# Patient Record
Sex: MALE | Race: BLACK OR AFRICAN AMERICAN | Employment: OTHER | URBAN - METROPOLITAN AREA
[De-identification: names, ages, dates, MRNs, and addresses within clinical notes are randomized per-mention and may not be internally consistent; named-entity substitution may affect disease eponyms.]

---

## 2019-12-26 PROBLEM — N20.0 CALCULUS OF KIDNEY: Status: ACTIVE | Noted: 2019-10-23

## 2020-09-08 ENCOUNTER — ED HISTORICAL/CONVERTED ENCOUNTER (OUTPATIENT)
Dept: OTHER | Age: 60
End: 2020-09-08

## 2021-05-11 ENCOUNTER — HOSPITAL ENCOUNTER (EMERGENCY)
Age: 61
Discharge: HOME | End: 2021-05-11
Attending: EMERGENCY MEDICINE
Payer: MEDICARE

## 2021-05-11 VITALS
HEART RATE: 106 BPM | TEMPERATURE: 98.3 F | RESPIRATION RATE: 18 BRPM | HEIGHT: 69 IN | OXYGEN SATURATION: 97 % | BODY MASS INDEX: 18.51 KG/M2 | DIASTOLIC BLOOD PRESSURE: 87 MMHG | WEIGHT: 125 LBS | SYSTOLIC BLOOD PRESSURE: 124 MMHG

## 2021-05-11 DIAGNOSIS — T50.Z95A SIDE EFFECTS OF VACCINATION, INITIAL ENCOUNTER: Primary | ICD-10-CM

## 2021-05-11 LAB
ATRIAL RATE: 109 BPM
CALCULATED P AXIS, ECG09: 73 DEGREES
CALCULATED R AXIS, ECG10: 32 DEGREES
CALCULATED T AXIS, ECG11: 28 DEGREES
DIAGNOSIS, 93000: NORMAL
P-R INTERVAL, ECG05: 152 MS
Q-T INTERVAL, ECG07: 330 MS
QRS DURATION, ECG06: 84 MS
QTC CALCULATION (BEZET), ECG08: 444 MS
VENTRICULAR RATE, ECG03: 109 BPM

## 2021-05-11 PROCEDURE — 74011250637 HC RX REV CODE- 250/637: Performed by: EMERGENCY MEDICINE

## 2021-05-11 PROCEDURE — 93005 ELECTROCARDIOGRAM TRACING: CPT

## 2021-05-11 PROCEDURE — 99284 EMERGENCY DEPT VISIT MOD MDM: CPT

## 2021-05-11 RX ORDER — IBUPROFEN 600 MG/1
600 TABLET ORAL
Status: COMPLETED | OUTPATIENT
Start: 2021-05-11 | End: 2021-05-11

## 2021-05-11 RX ORDER — IBUPROFEN 400 MG/1
400 TABLET ORAL
Qty: 20 TAB | Refills: 0 | Status: SHIPPED | OUTPATIENT
Start: 2021-05-11 | End: 2021-11-01

## 2021-05-11 RX ADMIN — IBUPROFEN 600 MG: 600 TABLET, FILM COATED ORAL at 01:09

## 2021-05-11 NOTE — ED TRIAGE NOTES
Pt received first dose of COVID vaccine today. Has complained of arm pain ever since.  Relief with tylenol. + ETOH

## 2021-05-11 NOTE — ED PROVIDER NOTES
EMERGENCY DEPARTMENT HISTORY AND PHYSICAL EXAM      Date: 5/11/2021  Patient Name: Emily Ward    History of Presenting Illness     Chief Complaint   Patient presents with    Arm Pain       History Provided By: Patient    HPI: Emily Ward, 62 y.o. male with a past medical history significant hypertension and hyperlipidemia presents to the ED with cc of right arm pain after the Covid injection. Patient said that he got Covid injection right upper arm this morning. Ever since he has been having pain that is starting the site of injection she was down to his arm. Patient denies any vomiting. No fever. No headache. No neck pain. No injury. No trauma. No chest pain or shortness of breath. No other complaints at this time. There are no other complaints, changes, or physical findings at this time. PCP: Shivani Bass NP    No current facility-administered medications on file prior to encounter. Current Outpatient Medications on File Prior to Encounter   Medication Sig Dispense Refill    atorvastatin (LIPITOR) 10 mg tablet TAKE 1 TABLET BY MOUTH EVERYDAY AT BEDTIME      divalproex DR (DEPAKOTE) 250 mg tablet Take 250 mg by mouth.  gabapentin (NEURONTIN) 300 mg capsule Take 300 mg by mouth.  metoprolol tartrate (LOPRESSOR) 50 mg tablet TAKE 1 TABLET BY MOUTH TWICE A DAY  0    HYDROcodone-acetaminophen (NORCO) 5-325 mg per tablet Take 1 Tab by mouth. Past History     Past Medical History:  Past Medical History:   Diagnosis Date    Hypercholesterolemia     Hypertension        Past Surgical History:  Past Surgical History:   Procedure Laterality Date    HX NEPHROSTOMY         Family History:  History reviewed. No pertinent family history. Social History:  Social History     Tobacco Use    Smoking status: Never Smoker    Smokeless tobacco: Current User   Substance Use Topics    Alcohol use:  Yes    Drug use: Never       Allergies:  No Known Allergies      Review of Systems     Review of Systems   Constitutional: Negative. HENT: Negative. Eyes: Negative. Respiratory: Negative. Cardiovascular: Negative. Gastrointestinal: Negative. Endocrine: Negative. Genitourinary: Negative. Musculoskeletal: Negative. Skin: Negative. Allergic/Immunologic: Negative. Neurological: Negative. Hematological: Negative. Psychiatric/Behavioral: The patient is nervous/anxious. All other systems reviewed and are negative. Physical Exam     Physical Exam  Vitals signs and nursing note reviewed. Constitutional:       General: He is not in acute distress. Appearance: Normal appearance. He is normal weight. He is not ill-appearing, toxic-appearing or diaphoretic. HENT:      Head: Normocephalic and atraumatic. Nose: Nose normal. No congestion or rhinorrhea. Eyes:      General: No scleral icterus. Right eye: No discharge. Left eye: No discharge. Extraocular Movements: Extraocular movements intact. Conjunctiva/sclera: Conjunctivae normal.      Pupils: Pupils are equal, round, and reactive to light. Neck:      Musculoskeletal: Normal range of motion. No neck rigidity or muscular tenderness. Cardiovascular:      Rate and Rhythm: Normal rate and regular rhythm. Pulses: Normal pulses. Heart sounds: Normal heart sounds. Pulmonary:      Effort: Pulmonary effort is normal. No respiratory distress. Breath sounds: Normal breath sounds. No stridor. No wheezing, rhonchi or rales. Chest:      Chest wall: No tenderness. Abdominal:      General: Abdomen is flat. Bowel sounds are normal. There is no distension. Palpations: Abdomen is soft. Tenderness: There is no abdominal tenderness. There is no right CVA tenderness, left CVA tenderness, guarding or rebound. Musculoskeletal: Normal range of motion. General: Tenderness present. No swelling, deformity or signs of injury.       Right lower leg: No edema. Left lower leg: No edema. Comments: Positive mild to moderate tenderness upon palpation over deltoid muscle laterally due to the pain from the injection. This is over injection site. Patient has full range of motion of right shoulder right elbow and right wrist.   Skin:     General: Skin is warm. Coloration: Skin is not jaundiced or pale. Findings: No bruising, erythema, lesion or rash. Neurological:      General: No focal deficit present. Mental Status: He is alert and oriented to person, place, and time. Mental status is at baseline. Cranial Nerves: No cranial nerve deficit. Sensory: No sensory deficit. Motor: No weakness. Coordination: Coordination normal.   Psychiatric:         Behavior: Behavior normal.         Thought Content: Thought content normal.         Judgment: Judgment normal.      Comments: Patient is anxious. Lab and Diagnostic Study Results     Labs -   No results found for this or any previous visit (from the past 12 hour(s)). Radiologic Studies -   @lastxrresult@  CT Results  (Last 48 hours)    None        CXR Results  (Last 48 hours)    None            Medical Decision Making   - I am the first provider for this patient. - I reviewed the vital signs, available nursing notes, past medical history, past surgical history, family history and social history. - Initial assessment performed. The patients presenting problems have been discussed, and they are in agreement with the care plan formulated and outlined with them. I have encouraged them to ask questions as they arise throughout their visit. Vital Signs-Reviewed the patient's vital signs.   Patient Vitals for the past 12 hrs:   Temp Pulse Resp BP SpO2   05/11/21 0055 98.3 °F (36.8 °C) (!) 106 18 124/87 97 %       Records Reviewed: Nursing Notes          ED Course:          Provider Notes (Medical Decision Making):     MDM  Number of Diagnoses or Management Options  Side effects of vaccination, initial encounter: new, no workup  Diagnosis management comments: Differential diagnosis include vaccination side effect, cellulitis. Amount and/or Complexity of Data Reviewed  Independent visualization of images, tracings, or specimens: yes (EKG was done at 1:07 AM.  Sinus tachycardia. Rate of 109. Normal axis. No acute ST-T elevation. No STEMI. No old EKG available for comparison.)    Risk of Complications, Morbidity, and/or Mortality  Presenting problems: moderate  Diagnostic procedures: moderate  Management options: moderate  General comments: Patient remained stable throughout the course of treatment. Due to the patient age patient was made to order EKG. EKG did not show any acute abnormality except for mild tachycardia which I believe this is due to the patient being anxious at this time. .  Patient was given pain medicine with good results. Will discharge patient on pain medicine to follow-up with his primary care doctor. Patient understood and agree with her management. EKG was done at 1:07 AM.  Sinus tachycardia. Rate of 109. Normal axis. No acute ST-T elevation. No STEMI. No old EKG available for comparison. Procedures   Medical Decision Makingedical Decision Making  Performed by: Michael Rouse DO  PROCEDURES:  Procedures           Disposition   Disposition: Condition stable and improved    Discharged    DISCHARGE PLAN:  1. Current Discharge Medication List      CONTINUE these medications which have NOT CHANGED    Details   atorvastatin (LIPITOR) 10 mg tablet TAKE 1 TABLET BY MOUTH EVERYDAY AT BEDTIME      divalproex DR (DEPAKOTE) 250 mg tablet Take 250 mg by mouth.      gabapentin (NEURONTIN) 300 mg capsule Take 300 mg by mouth.      metoprolol tartrate (LOPRESSOR) 50 mg tablet TAKE 1 TABLET BY MOUTH TWICE A DAY  Refills: 0      HYDROcodone-acetaminophen (NORCO) 5-325 mg per tablet Take 1 Tab by mouth.            2.   Follow-up Information Follow up With Specialties Details Why Contact Conrado Hendrix NP Nurse Practitioner Schedule an appointment as soon as possible for a visit in 1 day  [address] [place] [phone]          3. Return to ED if worse   4. Current Discharge Medication List      START taking these medications    Details   ibuprofen (MOTRIN) 400 mg tablet Take 1 Tab by mouth every six (6) hours as needed for Pain. Qty: 20 Tab, Refills: 0  Start date: 5/11/2021               Diagnosis     Clinical Impression:   1. Side effects of vaccination, initial encounter        Attestations:    Denzel Gonzalez, DO    Please note that this dictation was completed with [employer], the computer voice recognition software. Quite often unanticipated grammatical, syntax, homophones, and other interpretive errors are inadvertently transcribed by the computer software. Please disregard these errors. Please excuse any errors that have escaped final proofreading. Thank you.

## 2021-05-27 ENCOUNTER — HOSPITAL ENCOUNTER (EMERGENCY)
Age: 61
Discharge: HOME | End: 2021-05-28
Payer: MEDICARE

## 2021-05-27 VITALS
HEIGHT: 69 IN | WEIGHT: 130 LBS | DIASTOLIC BLOOD PRESSURE: 72 MMHG | OXYGEN SATURATION: 97 % | HEART RATE: 85 BPM | BODY MASS INDEX: 19.26 KG/M2 | SYSTOLIC BLOOD PRESSURE: 104 MMHG | RESPIRATION RATE: 20 BRPM | TEMPERATURE: 97.8 F

## 2021-05-27 DIAGNOSIS — M62.838 TRAPEZIUS MUSCLE SPASM: Primary | ICD-10-CM

## 2021-05-27 PROCEDURE — 99283 EMERGENCY DEPT VISIT LOW MDM: CPT

## 2021-05-28 NOTE — ED PROVIDER NOTES
EMERGENCY DEPARTMENT HISTORY AND PHYSICAL EXAM      Date: 5/27/2021  Patient Name: Irene Chacon    History of Presenting Illness     Chief Complaint   Patient presents with    Arm Pain       History Provided By: Patient    HPI: Irene Chacon, 62 y.o. male with a past medical history significant hypertension and hyperlipidemia presents to the ED with cc of arm pain. Patient claims of the arm pain at her Covid vaccine. Patient admits to drinking. Patient has no other complaints at this time. Moderate severity, no known exacerbating or relieving factors, no other associated signs and symptoms. Patient specifically denies fever, chills, chest pain, shortness of breath, abdominal pain, nausea, vomiting, diarrhea. There are no other complaints, changes, or physical findings at this time. PCP: Maribell Burciaga NP    No current facility-administered medications on file prior to encounter. Current Outpatient Medications on File Prior to Encounter   Medication Sig Dispense Refill    ibuprofen (MOTRIN) 400 mg tablet Take 1 Tab by mouth every six (6) hours as needed for Pain. 20 Tab 0    atorvastatin (LIPITOR) 10 mg tablet TAKE 1 TABLET BY MOUTH EVERYDAY AT BEDTIME      divalproex DR (DEPAKOTE) 250 mg tablet Take 250 mg by mouth.  gabapentin (NEURONTIN) 300 mg capsule Take 300 mg by mouth.  metoprolol tartrate (LOPRESSOR) 50 mg tablet TAKE 1 TABLET BY MOUTH TWICE A DAY  0    HYDROcodone-acetaminophen (NORCO) 5-325 mg per tablet Take 1 Tab by mouth. Past History     Past Medical History:  Past Medical History:   Diagnosis Date    Hypercholesterolemia     Hypertension        Past Surgical History:  Past Surgical History:   Procedure Laterality Date    HX NEPHROSTOMY         Family History:  History reviewed. No pertinent family history.     Social History:  Social History     Tobacco Use    Smoking status: Never Smoker    Smokeless tobacco: Current User   Vaping Use    Vaping Use: Never used   Substance Use Topics    Alcohol use: Yes    Drug use: Never       Allergies:  No Known Allergies      Review of Systems     Review of Systems   Constitutional: Negative for chills and fever. HENT: Negative for dental problem and sore throat. Eyes: Negative for pain and visual disturbance. Respiratory: Negative for cough and chest tightness. Cardiovascular: Negative for chest pain. Gastrointestinal: Negative for diarrhea and nausea. Genitourinary: Negative for difficulty urinating and frequency. Musculoskeletal: Positive for arthralgias. Negative for gait problem and joint swelling. Neurological: Positive for headaches. Negative for numbness. Hematological: Negative for adenopathy. Does not bruise/bleed easily. Psychiatric/Behavioral: Negative for behavioral problems and suicidal ideas. Physical Exam     Physical Exam  Constitutional:       General: He is not in acute distress. Appearance: Normal appearance. He is not ill-appearing or toxic-appearing. HENT:      Head: Normocephalic and atraumatic. Nose: Nose normal.      Mouth/Throat:      Mouth: Mucous membranes are moist.   Eyes:      Extraocular Movements: Extraocular movements intact. Pupils: Pupils are equal, round, and reactive to light. Cardiovascular:      Rate and Rhythm: Normal rate and regular rhythm. Pulmonary:      Effort: Pulmonary effort is normal.      Breath sounds: Normal breath sounds. Abdominal:      General: Bowel sounds are normal.   Musculoskeletal:         General: Normal range of motion. Right upper arm: Tenderness present. Arms:       Cervical back: Normal range of motion and neck supple. Skin:     General: Skin is warm and dry. Capillary Refill: Capillary refill takes less than 2 seconds. Neurological:      General: No focal deficit present. Mental Status: He is alert and oriented to person, place, and time.    Psychiatric:         Mood and Affect: Mood normal.         Behavior: Behavior normal.         Lab and Diagnostic Study Results     Labs -   No results found for this or any previous visit (from the past 12 hour(s)). Radiologic Studies -   @lastxrresult@  CT Results  (Last 48 hours)    None        CXR Results  (Last 48 hours)    None            Medical Decision Making   - I am the first provider for this patient. - I reviewed the vital signs, available nursing notes, past medical history, past surgical history, family history and social history. - Initial assessment performed. The patients presenting problems have been discussed, and they are in agreement with the care plan formulated and outlined with them. I have encouraged them to ask questions as they arise throughout their visit. Vital Signs-Reviewed the patient's vital signs. Patient Vitals for the past 12 hrs:   Temp Pulse Resp BP SpO2   05/27/21 2353 97.8 °F (36.6 °C) 85 20 104/72 97 %       Records Reviewed: Nursing Notes and Old Medical Records          ED Course:          Provider Notes (Medical Decision Making):   Patient presents with right arm pain. Differential diagnosis include strain, sprain, phlebitis, Covid arm. Patient intoxicated with EtOH. Patient able ambulate without difficulty. Pain complaints of pain where he had his Covid vaccine. MDM       Procedures   Medical Decision Makingedical Decision Making  Performed by: Odilia Morrison NP  PROCEDURES:  Procedures       Disposition   Disposition: DC- Adult Discharges: All of the diagnostic tests were reviewed and questions answered. Diagnosis, care plan and treatment options were discussed. The patient understands the instructions and will follow up as directed. The patients results have been reviewed with them. They have been counseled regarding their diagnosis.   The patient verbally convey understanding and agreement of the signs, symptoms, diagnosis, treatment and prognosis and additionally agrees to follow up as recommended with their PCP in 24 - 48 hours. They also agree with the care-plan and convey that all of their questions have been answered. I have also put together some discharge instructions for them that include: 1) educational information regarding their diagnosis, 2) how to care for their diagnosis at home, as well a 3) list of reasons why they would want to return to the ED prior to their follow-up appointment, should their condition change. DISCHARGE PLAN:  1. Current Discharge Medication List      CONTINUE these medications which have NOT CHANGED    Details   ibuprofen (MOTRIN) 400 mg tablet Take 1 Tab by mouth every six (6) hours as needed for Pain. Qty: 20 Tab, Refills: 0      atorvastatin (LIPITOR) 10 mg tablet TAKE 1 TABLET BY MOUTH EVERYDAY AT BEDTIME      divalproex DR (DEPAKOTE) 250 mg tablet Take 250 mg by mouth.      gabapentin (NEURONTIN) 300 mg capsule Take 300 mg by mouth.      metoprolol tartrate (LOPRESSOR) 50 mg tablet TAKE 1 TABLET BY MOUTH TWICE A DAY  Refills: 0      HYDROcodone-acetaminophen (NORCO) 5-325 mg per tablet Take 1 Tab by mouth. 2.   Follow-up Information    None       3. Return to ED if worse   4. Current Discharge Medication List            Diagnosis     Clinical Impression: No diagnosis found. Attestations:    Zaida Thompson NP    Please note that this dictation was completed with [employer], the computer voice recognition software. Quite often unanticipated grammatical, syntax, homophones, and other interpretive errors are inadvertently transcribed by the computer software. Please disregard these errors. Please excuse any errors that have escaped final proofreading. Thank you.

## 2021-07-12 ENCOUNTER — HOSPITAL ENCOUNTER (EMERGENCY)
Age: 61
Discharge: HOME | End: 2021-07-12
Payer: MEDICARE

## 2021-07-12 ENCOUNTER — APPOINTMENT (OUTPATIENT)
Dept: GENERAL RADIOLOGY | Age: 61
End: 2021-07-12
Attending: NURSE PRACTITIONER
Payer: MEDICARE

## 2021-07-12 VITALS
TEMPERATURE: 97.5 F | RESPIRATION RATE: 16 BRPM | HEART RATE: 90 BPM | DIASTOLIC BLOOD PRESSURE: 58 MMHG | OXYGEN SATURATION: 100 % | SYSTOLIC BLOOD PRESSURE: 108 MMHG

## 2021-07-12 DIAGNOSIS — Z76.0 MEDICATION REFILL: Primary | ICD-10-CM

## 2021-07-12 DIAGNOSIS — M79.605 CHRONIC PAIN OF LEFT LOWER EXTREMITY: ICD-10-CM

## 2021-07-12 DIAGNOSIS — G89.29 CHRONIC PAIN OF LEFT LOWER EXTREMITY: ICD-10-CM

## 2021-07-12 PROCEDURE — 99284 EMERGENCY DEPT VISIT MOD MDM: CPT

## 2021-07-12 PROCEDURE — 73590 X-RAY EXAM OF LOWER LEG: CPT

## 2021-07-12 PROCEDURE — 74011250637 HC RX REV CODE- 250/637: Performed by: NURSE PRACTITIONER

## 2021-07-12 PROCEDURE — 73610 X-RAY EXAM OF ANKLE: CPT

## 2021-07-12 RX ORDER — DIVALPROEX SODIUM 250 MG/1
250 TABLET, DELAYED RELEASE ORAL
Status: COMPLETED | OUTPATIENT
Start: 2021-07-12 | End: 2021-07-12

## 2021-07-12 RX ORDER — NAPROXEN 500 MG/1
500 TABLET ORAL
Status: COMPLETED | OUTPATIENT
Start: 2021-07-12 | End: 2021-07-12

## 2021-07-12 RX ORDER — NAPROXEN 500 MG/1
500 TABLET ORAL 2 TIMES DAILY WITH MEALS
Qty: 30 TABLET | Refills: 0 | Status: ON HOLD | OUTPATIENT
Start: 2021-07-12 | End: 2021-11-01 | Stop reason: SDUPTHER

## 2021-07-12 RX ORDER — DIVALPROEX SODIUM 250 MG/1
250 TABLET, DELAYED RELEASE ORAL 2 TIMES DAILY
Qty: 60 TABLET | Refills: 0 | Status: SHIPPED | OUTPATIENT
Start: 2021-07-12 | End: 2021-08-11

## 2021-07-12 RX ADMIN — NAPROXEN 500 MG: 500 TABLET ORAL at 03:05

## 2021-07-12 RX ADMIN — DIVALPROEX SODIUM 250 MG: 250 TABLET, DELAYED RELEASE ORAL at 03:00

## 2021-07-12 NOTE — DISCHARGE INSTRUCTIONS
Thank you! Thank you for allowing me to care for you in the emergency department. I sincerely hope that you are satisfied with your visit today. It is my goal to provide you with excellent care. Below you will find a list of your labs and imaging from your visit today. Should you have any questions regarding these results please do not hesitate to call the emergency department. Labs -   No results found for this or any previous visit (from the past 12 hour(s)). Radiologic Studies -   XR TIB/FIB LT   Final Result   Findings/impression:      3 views of the left ankle, 4 views of the left tibia/fibula. Status post ORIF of distal tibial diaphyseal fracture. Fracture is healed. There   is chronic mild apex medial angulation of the distal tibia and fibula. No   evidence of acute fracture. Articular alignment is anatomic. Ankle mortise is intact without evidence of widening. XR ANKLE LT MIN 3 V   Final Result   Findings/impression:      3 views of the left ankle, 4 views of the left tibia/fibula. Status post ORIF of distal tibial diaphyseal fracture. Fracture is healed. There   is chronic mild apex medial angulation of the distal tibia and fibula. No   evidence of acute fracture. Articular alignment is anatomic. Ankle mortise is intact without evidence of widening. CT Results  (Last 48 hours)      None          CXR Results  (Last 48 hours)      None               If you feel that you have not received excellent quality care or timely care, please ask to speak to the nurse manager. Please choose us in the future for your continued health care needs. ------------------------------------------------------------------------------------------------------------  The exam and treatment you received in the Emergency Department were for an urgent problem and are not intended as complete care.  It is important that you follow-up with a doctor, nurse practitioner, or physician assistant to:  (1) confirm your diagnosis,  (2) re-evaluation of changes in your illness and treatment, and  (3) for ongoing care. If your symptoms become worse or you do not improve as expected and you are unable to reach your usual health care provider, you should return to the Emergency Department. We are available 24 hours a day. Please take your discharge instructions with you when you go to your follow-up appointment. If you have any problem arranging a follow-up appointment, contact the Emergency Department immediately. If a prescription has been provided, please have it filled as soon as possible to prevent a delay in treatment. Read the entire medication instruction sheet provided to you by the pharmacy. If you have any questions or reservations about taking the medication due to side effects or interactions with other medications, please call your primary care physician or contact the ER to speak with the charge nurse. Make an appointment with your family doctor or the physician you were referred to for follow-up of this visit as instructed on your discharge paperwork, as this is a mandatory follow-up. Return to the ER if you are unable to be seen or if you are unable to be seen in a timely manner. If you have any problem arranging the follow-up visit, contact the Emergency Department immediately.

## 2021-07-12 NOTE — ED PROVIDER NOTES
EMERGENCY DEPARTMENT HISTORY AND PHYSICAL EXAM      Date: 7/12/2021  Patient Name: Pedro Messina    History of Presenting Illness     Chief Complaint   Patient presents with    Alcohol Problem     ETOH \"I only drank 1oz\" , c/o leg pain, cough, headache    Leg Pain    Cough    Headache       History Provided By: Patient    HPI: Pedro Messina, 62 y.o. male with a past medical history significant for HTN, seizures and hyperlipidemia presents to the ED with cc of \"I need a colonoscopy and I ran out of my seizure medication today and my leg with the pins and screws and it has been hurting since Zeinab\". He denies any recent injury or trauma to his leg. He states he needs percocet and a new orthopedic doctor follow-up with since his surgeon was in [place]. Patient was advised that we do not do routine colonoscopies from the emergency room. He denies any GI concerns and states he was advised by his PCP to get one due to his age. There are no other complaints, changes, or physical findings at this time. PCP: iGa Murcia NP    No current facility-administered medications on file prior to encounter. Current Outpatient Medications on File Prior to Encounter   Medication Sig Dispense Refill    ibuprofen (MOTRIN) 400 mg tablet Take 1 Tab by mouth every six (6) hours as needed for Pain. 20 Tab 0    atorvastatin (LIPITOR) 10 mg tablet TAKE 1 TABLET BY MOUTH EVERYDAY AT BEDTIME      gabapentin (NEURONTIN) 300 mg capsule Take 300 mg by mouth.  metoprolol tartrate (LOPRESSOR) 50 mg tablet TAKE 1 TABLET BY MOUTH TWICE A DAY  0    HYDROcodone-acetaminophen (NORCO) 5-325 mg per tablet Take 1 Tab by mouth.  [DISCONTINUED] divalproex DR (DEPAKOTE) 250 mg tablet Take 250 mg by mouth.          Past History     Past Medical History:  Past Medical History:   Diagnosis Date    Hypercholesterolemia     Hypertension        Past Surgical History:  Past Surgical History:   Procedure Laterality Date    HX NEPHROSTOMY         Family History:  No family history on file. Social History:  Social History     Tobacco Use    Smoking status: Never Smoker    Smokeless tobacco: Current User   Vaping Use    Vaping Use: Never used   Substance Use Topics    Alcohol use: Yes    Drug use: Never       Allergies:  No Known Allergies      Review of Systems     Review of Systems   Gastrointestinal: Negative. Musculoskeletal: Positive for arthralgias. All other systems reviewed and are negative. Physical Exam     Physical Exam  Vitals and nursing note reviewed. Constitutional:       General: He is not in acute distress. Comments: Unkempt appearing   Cardiovascular:      Rate and Rhythm: Normal rate and regular rhythm. Heart sounds: Normal heart sounds. Pulmonary:      Effort: Pulmonary effort is normal.      Breath sounds: Decreased breath sounds present. No wheezing or rales. Musculoskeletal:         General: Normal range of motion. Left lower leg: Tenderness present. No swelling or bony tenderness. No edema. Left ankle: No swelling or deformity. Tenderness present over the medial malleolus. Normal range of motion. Normal pulse. Left Achilles Tendon: Normal.   Skin:     General: Skin is warm and dry. Neurological:      Mental Status: He is alert. Psychiatric:         Mood and Affect: Mood normal.         Speech: Speech normal.         Behavior: Behavior normal. Behavior is cooperative. Lab and Diagnostic Study Results     Labs -   No results found for this or any previous visit (from the past 12 hour(s)). Radiologic Studies -   XR Results (most recent):  Results from Hospital Encounter encounter on 07/12/21    XR TIB/FIB LT    Narrative  Study: XR ANKLE LT MIN 3 V, XR TIB/FIB LT    Clinical indication: pain, surgical hx    Comparison: None. Impression  Findings/impression:    3 views of the left ankle, 4 views of the left tibia/fibula.     Status post ORIF of distal tibial diaphyseal fracture. Fracture is healed. There  is chronic mild apex medial angulation of the distal tibia and fibula. No  evidence of acute fracture. Articular alignment is anatomic. Ankle mortise is intact without evidence of widening. Medical Decision Making   - I am the first provider for this patient. - I reviewed the vital signs, available nursing notes, past medical history, past surgical history, family history and social history. - Initial assessment performed. The patients presenting problems have been discussed, and they are in agreement with the care plan formulated and outlined with them. I have encouraged them to ask questions as they arise throughout their visit. Vital Signs-Reviewed the patient's vital signs. Patient Vitals for the past 12 hrs:   Temp Pulse Resp BP SpO2   07/12/21 0136 97.5 °F (36.4 °C) 90 16 (!) 108/58 100 %       Records Reviewed: Nursing Notes    ED Course:   Patient presents for medication refill and with chronic leg pain secondary to previous surgery 10+yrs ago, no recent injury, he is requesting pain medication and a new orthopedic doctor referral.  Patient was given evening dose of Depakote for seizures that he ran out of, Rx refilled and patient reminded to follow-up with PCP for future refills. X-ray without acute findings. He was treated with naproxen and Rx sent. PCP/Ortho follow-up provided.        Provider Notes (Medical Decision Making):     MDM  Number of Diagnoses or Management Options  Chronic pain of left lower extremity: established, worsening  Medication refill: minor     Amount and/or Complexity of Data Reviewed  Tests in the radiology section of CPT®: ordered and reviewed  Independent visualization of images, tracings, or specimens: yes    Risk of Complications, Morbidity, and/or Mortality  Presenting problems: minimal  Diagnostic procedures: minimal  Management options: minimal    Patient Progress  Patient progress: stable Disposition   Disposition: Condition stable  DC-The patient was given verbal follow-up instructions  DC- Pain Control DC Home plan: Nonsteroidals, Tylenol and Referral Family Medicine/PCP and Orthopedics    Discharged    DISCHARGE PLAN:  1. Current Discharge Medication List      CONTINUE these medications which have NOT CHANGED    Details   ibuprofen (MOTRIN) 400 mg tablet Take 1 Tab by mouth every six (6) hours as needed for Pain. Qty: 20 Tab, Refills: 0      atorvastatin (LIPITOR) 10 mg tablet TAKE 1 TABLET BY MOUTH EVERYDAY AT BEDTIME      divalproex DR (DEPAKOTE) 250 mg tablet Take 250 mg by mouth.      gabapentin (NEURONTIN) 300 mg capsule Take 300 mg by mouth.      metoprolol tartrate (LOPRESSOR) 50 mg tablet TAKE 1 TABLET BY MOUTH TWICE A DAY  Refills: 0      HYDROcodone-acetaminophen (NORCO) 5-325 mg per tablet Take 1 Tab by mouth. 2.   Follow-up Information     Follow up With Specialties Details Why Contact Info    Shima Randall NP Nurse Practitioner Schedule an appointment as soon as possible for a visit  for ER follow up [address] [facility] [address]  Luis Enrique Thorne MD Orthopedic Surgery Schedule an appointment as soon as possible for a visit  orthopedics, If symptoms worsen [facility] [address]  [phone]          3. Return to ED if worse   4. Current Discharge Medication List      START taking these medications    Details   divalproex DR (Depakote) 250 mg tablet Take 1 Tablet by mouth two (2) times a day for 30 days. Qty: 60 Tablet, Refills: 0  Start date: 7/12/2021, End date: 8/11/2021      naproxen (NAPROSYN) 500 mg tablet Take 1 Tablet by mouth two (2) times daily (with meals). Qty: 30 Tablet, Refills: 0  Start date: 7/12/2021               Diagnosis     Clinical Impression:   1. Medication refill    2.  Chronic pain of left lower extremity        Attestations:    Jacky Rivera NP    Please note that this dictation was completed with [employer], the computer voice recognition software. Quite often unanticipated grammatical, syntax, homophones, and other interpretive errors are inadvertently transcribed by the computer software. Please disregard these errors. Please excuse any errors that have escaped final proofreading. Thank you.

## 2021-10-21 ENCOUNTER — HOSPITAL ENCOUNTER (INPATIENT)
Age: 61
LOS: 10 days | Discharge: HOME | DRG: 885 | End: 2021-11-01
Attending: FAMILY MEDICINE | Admitting: PSYCHIATRY & NEUROLOGY
Payer: MEDICARE

## 2021-10-21 DIAGNOSIS — T50.902A INTENTIONAL DRUG OVERDOSE, INITIAL ENCOUNTER (HCC): Primary | ICD-10-CM

## 2021-10-21 LAB
ALBUMIN SERPL-MCNC: 2.7 G/DL (ref 3.5–5)
ALBUMIN/GLOB SERPL: 0.8 {RATIO} (ref 1.1–2.2)
ALP SERPL-CCNC: 46 U/L (ref 45–117)
ALT SERPL-CCNC: 22 U/L (ref 12–78)
AMPHET UR QL SCN: NEGATIVE
ANION GAP SERPL CALC-SCNC: 8 MMOL/L (ref 5–15)
APAP SERPL-MCNC: <10 UG/ML (ref 10–30)
APPEARANCE UR: CLEAR
AST SERPL W P-5'-P-CCNC: 20 U/L (ref 15–37)
BACTERIA URNS QL MICRO: NEGATIVE /HPF
BARBITURATES UR QL SCN: NEGATIVE
BASOPHILS # BLD: 0 K/UL (ref 0–0.1)
BASOPHILS NFR BLD: 1 % (ref 0–1)
BENZODIAZ UR QL: NEGATIVE
BILIRUB SERPL-MCNC: 0.3 MG/DL (ref 0.2–1)
BILIRUB UR QL: NEGATIVE
BUN SERPL-MCNC: 7 MG/DL (ref 6–20)
BUN/CREAT SERPL: 9 (ref 12–20)
CA-I BLD-MCNC: 7 MG/DL (ref 8.5–10.1)
CANNABINOIDS UR QL SCN: NEGATIVE
CHLORIDE SERPL-SCNC: 118 MMOL/L (ref 97–108)
CO2 SERPL-SCNC: 20 MMOL/L (ref 21–32)
COCAINE UR QL SCN: NEGATIVE
COLOR UR: ABNORMAL
CREAT SERPL-MCNC: 0.75 MG/DL (ref 0.7–1.3)
DATE LAST DOSE: ABNORMAL
DATE LAST DOSE: ABNORMAL
DIFFERENTIAL METHOD BLD: ABNORMAL
DRUG SCRN COMMENT,DRGCM: NORMAL
EOSINOPHIL # BLD: 0 K/UL (ref 0–0.4)
EOSINOPHIL NFR BLD: 1 % (ref 0–7)
ERYTHROCYTE [DISTWIDTH] IN BLOOD BY AUTOMATED COUNT: 15.9 % (ref 11.5–14.5)
ETHANOL SERPL-MCNC: 172 MG/DL
GLOBULIN SER CALC-MCNC: 3.2 G/DL (ref 2–4)
GLUCOSE SERPL-MCNC: 81 MG/DL (ref 65–100)
GLUCOSE UR STRIP.AUTO-MCNC: NEGATIVE MG/DL
HCT VFR BLD AUTO: 37.4 % (ref 36.6–50.3)
HGB BLD-MCNC: 12 G/DL (ref 12.1–17)
HGB UR QL STRIP: ABNORMAL
IMM GRANULOCYTES # BLD AUTO: 0 K/UL (ref 0–0.04)
IMM GRANULOCYTES NFR BLD AUTO: 0 % (ref 0–0.5)
KETONES UR QL STRIP.AUTO: NEGATIVE MG/DL
LEUKOCYTE ESTERASE UR QL STRIP.AUTO: NEGATIVE
LYMPHOCYTES # BLD: 2.2 K/UL (ref 0.8–3.5)
LYMPHOCYTES NFR BLD: 48 % (ref 12–49)
MCH RBC QN AUTO: 28 PG (ref 26–34)
MCHC RBC AUTO-ENTMCNC: 32.1 G/DL (ref 30–36.5)
MCV RBC AUTO: 87.4 FL (ref 80–99)
METHADONE UR QL: NEGATIVE
MONOCYTES # BLD: 0.4 K/UL (ref 0–1)
MONOCYTES NFR BLD: 8 % (ref 5–13)
MUCOUS THREADS URNS QL MICRO: ABNORMAL /LPF
NEUTS SEG # BLD: 1.9 K/UL (ref 1.8–8)
NEUTS SEG NFR BLD: 42 % (ref 32–75)
NITRITE UR QL STRIP.AUTO: NEGATIVE
NRBC # BLD: 0 K/UL (ref 0–0.01)
NRBC BLD-RTO: 0 PER 100 WBC
OPIATES UR QL: NEGATIVE
PCP UR QL: NEGATIVE
PH UR STRIP: 5 [PH] (ref 5–8)
PLATELET # BLD AUTO: 208 K/UL (ref 150–400)
PMV BLD AUTO: 9.2 FL (ref 8.9–12.9)
POTASSIUM SERPL-SCNC: 5.3 MMOL/L (ref 3.5–5.1)
PROT SERPL-MCNC: 5.9 G/DL (ref 6.4–8.2)
PROT UR STRIP-MCNC: NEGATIVE MG/DL
RBC # BLD AUTO: 4.28 M/UL (ref 4.1–5.7)
RBC #/AREA URNS HPF: ABNORMAL /HPF (ref 0–5)
REPORTED DOSE,DOSE: ABNORMAL UNITS
REPORTED DOSE,DOSE: ABNORMAL UNITS
SALICYLATES SERPL-MCNC: <1.7 MG/DL (ref 2.8–20)
SARS-COV-2, COV2: NORMAL
SODIUM SERPL-SCNC: 146 MMOL/L (ref 136–145)
SP GR UR REFRACTOMETRY: <1.005 (ref 1–1.03)
UROBILINOGEN UR QL STRIP.AUTO: 0.1 EU/DL (ref 0.1–1)
VALPROATE SERPL-MCNC: <3 UG/ML (ref 50–100)
WBC # BLD AUTO: 4.4 K/UL (ref 4.1–11.1)
WBC URNS QL MICRO: ABNORMAL /HPF (ref 0–4)

## 2021-10-21 PROCEDURE — 82077 ASSAY SPEC XCP UR&BREATH IA: CPT

## 2021-10-21 PROCEDURE — 80179 DRUG ASSAY SALICYLATE: CPT

## 2021-10-21 PROCEDURE — 80053 COMPREHEN METABOLIC PANEL: CPT

## 2021-10-21 PROCEDURE — 87635 SARS-COV-2 COVID-19 AMP PRB: CPT

## 2021-10-21 PROCEDURE — 80307 DRUG TEST PRSMV CHEM ANLYZR: CPT

## 2021-10-21 PROCEDURE — 85025 COMPLETE CBC W/AUTO DIFF WBC: CPT

## 2021-10-21 PROCEDURE — 81001 URINALYSIS AUTO W/SCOPE: CPT

## 2021-10-21 PROCEDURE — 93005 ELECTROCARDIOGRAM TRACING: CPT

## 2021-10-21 PROCEDURE — 74011250636 HC RX REV CODE- 250/636: Performed by: FAMILY MEDICINE

## 2021-10-21 PROCEDURE — 80143 DRUG ASSAY ACETAMINOPHEN: CPT

## 2021-10-21 PROCEDURE — 80164 ASSAY DIPROPYLACETIC ACD TOT: CPT

## 2021-10-21 PROCEDURE — 99285 EMERGENCY DEPT VISIT HI MDM: CPT

## 2021-10-21 PROCEDURE — 36415 COLL VENOUS BLD VENIPUNCTURE: CPT

## 2021-10-21 RX ORDER — SODIUM CHLORIDE 9 MG/ML
1000 INJECTION, SOLUTION INTRAVENOUS CONTINUOUS
Status: DISCONTINUED | OUTPATIENT
Start: 2021-10-21 | End: 2021-11-01 | Stop reason: HOSPADM

## 2021-10-21 RX ADMIN — SODIUM CHLORIDE 1000 ML/HR: 9 INJECTION, SOLUTION INTRAVENOUS at 22:13

## 2021-10-22 PROBLEM — T14.91XA SUICIDE ATTEMPT (HCC): Status: ACTIVE | Noted: 2021-10-22

## 2021-10-22 PROBLEM — F32.9 MAJOR DEPRESSION: Status: ACTIVE | Noted: 2021-10-22

## 2021-10-22 LAB
ATRIAL RATE: 73 BPM
CALCULATED P AXIS, ECG09: 82 DEGREES
CALCULATED R AXIS, ECG10: 46 DEGREES
CALCULATED T AXIS, ECG11: 43 DEGREES
COVID-19 RAPID TEST, COVR: NOT DETECTED
DIAGNOSIS, 93000: NORMAL
P-R INTERVAL, ECG05: 166 MS
POTASSIUM SERPL-SCNC: 4.1 MMOL/L (ref 3.5–5.1)
POTASSIUM SERPL-SCNC: NORMAL MMOL/L (ref 3.5–5.1)
Q-T INTERVAL, ECG07: 422 MS
QRS DURATION, ECG06: 98 MS
QTC CALCULATION (BEZET), ECG08: 464 MS
SPECIMEN SOURCE: NORMAL
VALPROATE SERPL-MCNC: <3 UG/ML (ref 50–100)
VENTRICULAR RATE, ECG03: 73 BPM

## 2021-10-22 PROCEDURE — 65220000003 HC RM SEMIPRIVATE PSYCH

## 2021-10-22 PROCEDURE — 84132 ASSAY OF SERUM POTASSIUM: CPT

## 2021-10-22 PROCEDURE — 80164 ASSAY DIPROPYLACETIC ACD TOT: CPT

## 2021-10-22 PROCEDURE — 74011250637 HC RX REV CODE- 250/637: Performed by: PSYCHIATRY & NEUROLOGY

## 2021-10-22 PROCEDURE — 36415 COLL VENOUS BLD VENIPUNCTURE: CPT

## 2021-10-22 RX ORDER — DIVALPROEX SODIUM 250 MG/1
250 TABLET, DELAYED RELEASE ORAL 2 TIMES DAILY
Status: DISCONTINUED | OUTPATIENT
Start: 2021-10-22 | End: 2021-10-22

## 2021-10-22 RX ORDER — ACETAMINOPHEN 325 MG/1
650 TABLET ORAL
Status: DISCONTINUED | OUTPATIENT
Start: 2021-10-22 | End: 2021-11-01 | Stop reason: HOSPADM

## 2021-10-22 RX ORDER — ADHESIVE BANDAGE
30 BANDAGE TOPICAL DAILY PRN
Status: DISCONTINUED | OUTPATIENT
Start: 2021-10-22 | End: 2021-11-01 | Stop reason: HOSPADM

## 2021-10-22 RX ORDER — TRAZODONE HYDROCHLORIDE 50 MG/1
50 TABLET ORAL
Status: DISCONTINUED | OUTPATIENT
Start: 2021-10-22 | End: 2021-10-22

## 2021-10-22 RX ORDER — LORAZEPAM 1 MG/1
1 TABLET ORAL
Status: DISCONTINUED | OUTPATIENT
Start: 2021-10-23 | End: 2021-10-28

## 2021-10-22 RX ORDER — HYDROXYZINE 50 MG/1
50 TABLET, FILM COATED ORAL
Status: DISCONTINUED | OUTPATIENT
Start: 2021-10-22 | End: 2021-11-01 | Stop reason: HOSPADM

## 2021-10-22 RX ORDER — DIVALPROEX SODIUM 500 MG/1
500 TABLET, DELAYED RELEASE ORAL 2 TIMES DAILY
Status: DISCONTINUED | OUTPATIENT
Start: 2021-10-23 | End: 2021-11-01 | Stop reason: HOSPADM

## 2021-10-22 RX ORDER — DIVALPROEX SODIUM 500 MG/1
500 TABLET, DELAYED RELEASE ORAL 2 TIMES DAILY
Status: DISCONTINUED | OUTPATIENT
Start: 2021-10-23 | End: 2021-10-23

## 2021-10-22 RX ORDER — TOPIRAMATE 25 MG/1
25 TABLET ORAL 2 TIMES DAILY
Status: DISCONTINUED | OUTPATIENT
Start: 2021-10-23 | End: 2021-11-01 | Stop reason: HOSPADM

## 2021-10-22 RX ADMIN — DIVALPROEX SODIUM 250 MG: 250 TABLET, DELAYED RELEASE ORAL at 20:30

## 2021-10-22 NOTE — BH NOTES
PSA PART II ADDITIONAL INFORMATION        Access To Fire Arms: No    Substance Use: YES    Last Use: 10.21.21    Type of Substance: Alcohol use    Frequency of Use: daily    Request to See : YES    If yes, notified : YES    Release of Information Signed: YES    Release of Information Signed For: Desi Walker [phone]

## 2021-10-22 NOTE — GROUP NOTE
KEANU  GROUP DOCUMENTATION INDIVIDUAL                                                                          Group Therapy Note    Date: 10/22/2021    Group Start Time: 0930  Group End Time: 1000  Group Topic: [employer]    SRM 2 BE[facility] [address] [facility], RN    IP [address] [facility] GROUP DOCUMENTATION GROUP    Group Therapy Note  Community Meeting/Nursing Education          Attendance: Attended    Patient's Goal:      Interventions/techniques: Supported    Follows Directions:  Followed directions    Interactions: Interacted appropriately    Mental Status: Calm and Happy    Behavior/appearance: Attentive and Cooperative    Goals Achieved: Able to self-disclose and Discussed coping      Additional Notes:      Lia Pearce RN

## 2021-10-22 NOTE — ED PROVIDER NOTES
61-year-old with a past medical history of hypertension and seizure disorder, and hyperlipidemia who is here today with a chief complaint of intentional overdose. The patient states that he took approximately 2 250 mg Depakote tablets and approximately 6 50 mg metoprolol tablets at approximately 6 PM in an attempt to hurt himself. Past Medical History:   Diagnosis Date    Hypercholesterolemia     Hypertension        Past Surgical History:   Procedure Laterality Date    HX NEPHROSTOMY           No family history on file. Social History     Socioeconomic History    Marital status: SINGLE     Spouse name: Not on file    Number of children: Not on file    Years of education: Not on file    Highest education level: Not on file   Occupational History    Not on file   Tobacco Use    Smoking status: Never Smoker    Smokeless tobacco: Current User   Vaping Use    Vaping Use: Never used   Substance and Sexual Activity    Alcohol use: Yes    Drug use: Never    Sexual activity: Not Currently   Other Topics Concern    Not on file   Social History Narrative    Not on file     Social Determinants of Health     Financial Resource Strain:     Difficulty of Paying Living Expenses:    Food Insecurity:     Worried About Running Out of Food in the Last Year:     [address] in the Last Year:    Transportation Needs:     Lack of Transportation (Medical):      Lack of Transportation (Non-Medical):    Physical Activity:     Days of Exercise per Week:     Minutes of Exercise per Session:    Stress:     Feeling of Stress :    Social Connections:     Frequency of Communication with Friends and Family:     Frequency of Social Gatherings with Friends and Family:     Attends Ten[employer] Services:     Active Member of Clubs or Organizations:     Attends Club or Organization Meetings:     Marital Status:    Intimate Partner Violence:     Fear of Current or Ex-Partner:     Emotionally Abused:     Physically Abused:     Sexually Abused: ALLERGIES: Patient has no known allergies. Review of Systems   Constitutional: Negative for chills and fever. HENT: Negative for rhinorrhea and sore throat. Eyes: Negative for pain and redness. Respiratory: Negative for cough and shortness of breath. Cardiovascular: Negative for chest pain and leg swelling. Gastrointestinal: Negative for abdominal pain, nausea and vomiting. Endocrine: Negative for polydipsia and polyuria. Genitourinary: Negative for decreased urine volume, dysuria and frequency. Musculoskeletal: Negative for arthralgias and back pain. Skin: Negative for color change and rash. Allergic/Immunologic: Negative for environmental allergies, food allergies and immunocompromised state. Neurological: Negative for dizziness and headaches. Hematological: Negative for adenopathy. Does not bruise/bleed easily. Psychiatric/Behavioral: Positive for dysphoric mood, self-injury and suicidal ideas. Negative for hallucinations. The patient is nervous/anxious. All other systems reviewed and are negative. Vitals:    10/21/21 2149 10/21/21 2152   BP:  91/61   Pulse:  76   Resp:  16   Temp:  98.2 °F (36.8 °C)   SpO2:  97%   Weight: 63.5 kg (140 lb)    Height: 5' 6\" (1.676 m)             Physical Exam  Vitals and nursing note reviewed. Constitutional:       General: He is not in acute distress. Appearance: He is not ill-appearing or toxic-appearing. HENT:      Head: Normocephalic and atraumatic. Right Ear: External ear normal.      Left Ear: External ear normal.      Nose: Nose normal. No congestion or rhinorrhea. Mouth/Throat:      Mouth: Mucous membranes are moist.      Pharynx: No oropharyngeal exudate or posterior oropharyngeal erythema. Eyes:      Extraocular Movements: Extraocular movements intact. Conjunctiva/sclera: Conjunctivae normal.      Pupils: Pupils are equal, round, and reactive to light. Cardiovascular:      Rate and Rhythm: Normal rate and regular rhythm. Pulses: Normal pulses. Heart sounds: Normal heart sounds. No murmur heard. No friction rub. No gallop. Pulmonary:      Effort: Pulmonary effort is normal. No respiratory distress. Breath sounds: Normal breath sounds. No wheezing, rhonchi or rales. Abdominal:      General: Abdomen is flat. Bowel sounds are normal.      Palpations: Abdomen is soft. Musculoskeletal:         General: No deformity or signs of injury. Cervical back: Neck supple. No tenderness. Skin:     General: Skin is warm and dry. Capillary Refill: Capillary refill takes less than 2 seconds. Neurological:      General: No focal deficit present. Mental Status: He is alert. Cranial Nerves: No cranial nerve deficit. Sensory: No sensory deficit. Motor: No weakness. Coordination: Coordination normal.   Psychiatric:      Comments: Depressed mood, poor insight and judgment        EKG 2200: Normal sinus rhythm, normal axis, heart rate 73, early repolarization, no ectopy. Reevaluation 0340: ImProved. Medically cleared. Accepted for admission by Dr. Sandra Karimi of psychiatry at  This time.

## 2021-10-22 NOTE — ED NOTES
Poison control called to follow up, informed of negative repeat Depakote level. Per poison control patient is medically cleared, information relayed to Provider. Patient is medically cleared and has been admitted to behavioral health.

## 2021-10-22 NOTE — GROUP NOTE
IP  GROUP DOCUMENTATION INDIVIDUAL                                                                          Group Therapy Note    Date: 10/22/2021    Group Start Time: 5086  Group End Time: 4162  Group Topic: Recreational/Music Therapy    SRM 2  NON ACUTE    Sachin Heaps    IP [address] [facility] GROUP DOCUMENTATION GROUP    Group Therapy Note    Facilitated leisure skills group to reinforce positive coping through music, social interaction, group activities and arts/crafts    Attendees: 5/14         Attendance: Attended    Patient's Goal:  Attend group daily     Interventions/techniques: Art integration and Supported    Follows Directions: Followed directions    Interactions: Interacted appropriately    Mental Status: Calm    Behavior/appearance: Cooperative    Goals Achieved: Able to engage in interactions and Able to listen to others      Additional Notes:  Receptive to listening to music while interacting with peers and staff.  Selected leisure task to work on later    [place], [place]

## 2021-10-22 NOTE — BH NOTES
PSYCHOSOCIAL ASSESSMENT  :Patient identifying info:   Сергей Hooper is a 62 y.o., male admitted 10/21/2021  9:47 PM     Presenting problem and precipitating factors:    Pt presented to the ED for suicidal ideation with a plan. PT reported that he was overwhelmed because he has been going through 'a lot of stress' due to his recent separation and that he started drinking again. Pt reports that he drinks everyday 'if I can get it'. Pt reported that he 'does not have intent to die' and does 'not want to die'    Mental status assessment:   Pt presented with a broad affect and congruent mood. Pt denied SI/HI/AVH at this time but endorsed anxiety. Pt presents as slightly disheveled, maintains poor eye contact and is a poor historian. Pt is oriented x3. Strengths:  Pt said that 'everything about me is a strength'  Collateral information:   Pt signed a release for his sister:   Len Batres [phone]  Current psychiatric /substance abuse providers and contact info:    pt denied this but said he is open to it   Previous psychiatric/substance abuse providers and response to treatment:   Pt reports he was hospitalized once before for an overdose and was at [facility]  Family history of mental illness or substance abuse:   Pt denied   Substance abuse history:    Social History     Tobacco Use    Smoking status: Never Smoker    Smokeless tobacco: Current User   Substance Use Topics    Alcohol use: Yes     Alcohol/week: 6.0 standard drinks     Types: 6 Cans of beer per week     Comment: 40oz/day   pt reports drinking every day    History of biomedical complications associated with substance abuse :  Pt denied   Patient's current acceptance of treatment or motivation for change:  Pt has fair insight but poor judgement into mental health and substance use treatment. Pt reported that he wants to be able to stop drinking and 'can when I want to'.  Pt denied wanting inpatient substance use treatment at this time   Family constellation:   Pt reports he has multiple siblings but is closest with his sister  Is significant other involved?    Pt denied     Describe support system:   sisters  Describe living arrangements and home environment:  Pt lives with his [address]  Date Reviewed: 2021        Codes Class Noted POA    Major depression ICD-10-CM: F32.9  ICD-9-CM: 296.20  10/22/2021 Unknown        Suicide attempt [facility]) ICD-10-CM: T14.91XA  ICD-9-CM: E958.9  10/22/2021 Unknown          n/a    Trauma history:   Pt denied   Legal issues:   Pt denied   History of  service:   Pt denied   Financial status:   Pt receives disability   Yazdanism/cultural factors:   Pt reports he is religous   Education/work history:   12th grade   Have you been licensed as a health care professional (current or ):   n/a  Leisure and recreation preferences:   Cooking   Describe coping skills:  Cooking   [employer]  10/22/2021
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
## 2021-10-22 NOTE — ED TRIAGE NOTES
Pt arrives via ems after reports of overdosing on an unknown amount of depakote and metoprolol. Pt is suicidal at present. Denies HI/AVH.

## 2021-10-22 NOTE — BSMART NOTE
[facility] INTAKE ASSESSMENT     Pt arrived at ED via ambulance and assessed in ED room 6. Pt presented with SI w/plan and depression     Pt presented with a full affect and laying in hospital bed with a green gown on. Pt thought process disorganized    Pt cognition impaired decision making    Pt reports 1 prior hospitalization for an attempted suicide via overdosing. Most Recent Hospitalizations if any: unknown - pt was a poor historian    Pt reports none    Pt does not have a hx of legal issues. Pt does not have hx of violence/aggression     Pt reports Alcohol use and  Cocaine use    Pt UDS positive for: None    Hx. Of Substance Treatment: NO  When: Not Applicable  Where: Not Applicable    Highest Level of Education: Pt reported that he graduated from [employer] in the 80's but does not read very well. Employment: NO    Source of Income: Disability     Housing: [facility]    Access to Weapons: NO     If weapons, Have they been removed: N/A    Trauma Hx:   Sexual: NO When:  Not Applicable By Whom:Not Applicable    Physical: NO  When: Not Applicable By Whom:Not Applicable    Verbal: NO  When: Not Applicable By Whom:Not Applicable      Family Support: Yes    Who: Sister - Clement Pope)      Dr. Alejandrina Zarco contacted and reports pt meets inpatient level of care and will be admitted to [address] [place] [address] pending medical clearance      This writer notified assigned RN Harley Seaman and assigned physician Debi Sales Safety Plan Completed: N/A        PATIENT NARRATIVE SUMMARY:  During [facility] Intake assessment, pt presented in a hospital gown and was laying in ED bed in room 6. He was A&O x4. Pt was polite and cooperative. Pt is an [place] American male who appeared his stated age of 62years old. He demonstrated poor eye contact but responded to questions in a manner suggestive of his ability to comprehend questions posed of him.  Pt's thought process and content were generally clear, however somewhat disorganized at times as he flipped back and forth to different stories when explaining events in his life. Pt appeared to be a poor historian with time frames but appeared to indicated no issues with memory or focus. Pt reported depression, suicidal ideation with a recent attempt of overdosing and ETOH. Pt denied HI and denied AVH. Pt reported a history of Depression but stated that he isn't currently taking any medication or seeing a Psychiatrist.     Note** Pt was alert and oriented. Pt Demonstrated understanding. However Pt's speech was slurred and he was difficult to understand at times.      Pt. Was brought to the ED via EMS after an attempted suicide by overdosing on pills. Reportedly, Pt took an unknown amount of depakote and metoprolol. Pt reported to this writer that he is overwhelmed with his situation and has been feeling depressed. He stated \"I wanted to die. I told the  that I'm already dead. I'm a dead man walking\". Pt reported that he has had a change of heart and doesn't feel that he wants to die right in this moment but continued to discuss feelings of being overwhelmed. Pt reported that he \"drinks 24/7\". He reported his last drink to be the morning of 10/21/21. Pt reported that he smokes crack on occasion and his last use was last month sometime. According to his labs, there didn't appear to be cocaine in his urine. Pt reported 1 prior hospitalization for an attempted suicide by overdosing. He reported that he had to get his stomach pumped at that time. He did not provide a time frame of when this hospitalization took place. He reported that it was \"here at this hospital\". Pt denied any mental health history in his family. He denied access to firearms. Pt denied having any legal issues. Pt denied any history of physical, emotional or sexual abuse. Pt denied any major medical issues besides past injuries and HBP. Pt denied any allergies.      Pt reported that his whole family is supportive. He reported that he is currently staying with his sister and feels that she is a big support for him. Pt stated that he graduated from [employer] but does not read very well.  He is not employed and currently receives disability.         This writer will follow up as needed.         Jose Juan Cadena, [address], CSOTP

## 2021-10-22 NOTE — ROUTINE PROCESS
TRANSFER - OUT REPORT:    Verbal report given to AMANDA MORRISNO(name) on Denny Gan  being transferred to [facility](unit) for routine progression of care       Report consisted of patients Situation, Background, Assessment and   Recommendations(SBAR). Information from the following report(s) SBAR, MAR and Recent Results was reviewed with the receiving nurse. Lines:   Peripheral IV 10/21/21 Left Antecubital (Active)        Opportunity for questions and clarification was provided.       Patient transported with:   Registered Nurse

## 2021-10-22 NOTE — ED NOTES
Spoke with Nirmal Garcia RN with poison control. Advised to obtain an initial depakote level and then subsequent depakote levels until it peaks. Once it begins to trend down pt can be cleared. If pt becomes hypotensive or bradycardic call poison control for further direction. Monitor BP/HR. Admin fluid bolus per Nirmal Garcia.

## 2021-10-22 NOTE — BH NOTES
Alee Joseph a 62year old  [place] male, voluntary, admitted under the professional care of Dr. Mary Telles with the diagnosis of Major Depression with Suicide Attempt, and ETOH abuse. Patient presented to the unit with a flat affect and little to no eye contact. Patient was pleasant and cooperative answering questions to the best of his ability but is a poor historian. Patient reports drinking six 40 oz beers per day, and came to the ED with an alcohol level of 172. He endorsed taking an overdose of depakote and metoprolol. \"I was going to end my life. \"  \"I thought about it when I was dying, 'It's not worth it. \"  He said \"I just got tired of folks. \"  Patient then stated \"Somebody watching my sisters house every week. \"  Patient denied having a psych history, but had one prior suicide attempt by overdosing. He has a medical history of seizures, and had orthopiedic surgery to his left lower leg with a gonzalo placed. Patient lives with his sister. He denied SI, HI, anxiety, depression, AH and VH. Patient placed on close observation, Q 15 minute checks.

## 2021-10-22 NOTE — ED NOTES
Poison control follow up call advised not change in status will continue to monitor and repeat Depakote level at 0230

## 2021-10-22 NOTE — GROUP NOTE
IP  GROUP DOCUMENTATION INDIVIDUAL                                                                          Group Therapy Note    Date: 10/22/2021    Group Start Time: 1125  Group End Time: 1200  Group Topic: Education Group - Inpatient    SRM 2  NON ACUTE    Mario Alberto Dawson    IP [facility] GROUP DOCUMENTATION GROUP    Group Therapy Note    Healthy relationships/Facilitated discussion focused on breaking down our walls and  being able to recognize attitudes and behaviors that may get in the way of forming or maintaining  quality relationships    Attendees: 5/14         Attendance: Attended    Patient's Goal:  Attend group daily     Interventions/techniques: Informed and Supported    Follows Directions: Followed directions    Interactions: Interacted appropriately    Mental Status: Calm    Behavior/appearance: Cooperative    Goals Achieved: Able to engage in interactions, Able to listen to others and Able to self-disclose      Additional Notes:  Receptive to information discussed and engaged.  Pt shared he recognize \"his addiction \"  get in the way of forming or maintaining a quality relationship    Marshall Whaley, [address]

## 2021-10-22 NOTE — GROUP NOTE
[facility] GROUP DOCUMENTATION INDIVIDUAL                                                                          Group Therapy Note    Date: 10/22/2021    Group Start Time: 3802  Group End Time: 1400  Group Topic: Process Group - Inpatient    SRM CARE MANAGEMENT    Allen Kumari    IP [facility] GROUP DOCUMENTATION GROUP    Group Therapy Note  Patients were encouraged to self-disclose about things that are weighing heavily on them today. Pts were encouraged to provide peer support and self-disclose. Pts were asked to identify triggers and actions and how to better cope with stressful situations. Attendees: 4/14         Attendance: Attended    Patient's Goal:  Pt responded to check in question and stated he is \"fine\"    Interventions/techniques: Challenged, Informed, Validated, Promoted peer support, Provide feedback and Supported    Follows Directions: Followed directions    Interactions: Interacted appropriately    Mental Status: Congruent, Elevated and Happy    Behavior/appearance: Attentive, Caretaking, Cooperative and Motivated    Goals Achieved: Able to engage in interactions, Able to listen to others, Able to reflect/comment on own behavior, Able to receive feedback, Able to self-disclose, Discussed coping and Identified feelings      Additional Notes:  Pt presented with an elevated and motivated affect and congruent mood. Pt was able to self-disclose and provide peer support. Pt was encouraging to his peers. Pt was polite and respectful.      Merced Cancino

## 2021-10-23 PROCEDURE — 74011250637 HC RX REV CODE- 250/637: Performed by: PSYCHIATRY & NEUROLOGY

## 2021-10-23 PROCEDURE — 65220000003 HC RM SEMIPRIVATE PSYCH

## 2021-10-23 RX ORDER — CITALOPRAM 10 MG/1
10 TABLET ORAL DAILY
Status: DISCONTINUED | OUTPATIENT
Start: 2021-10-24 | End: 2021-11-01 | Stop reason: HOSPADM

## 2021-10-23 RX ADMIN — TOPIRAMATE 25 MG: 25 TABLET, FILM COATED ORAL at 08:38

## 2021-10-23 RX ADMIN — LORAZEPAM 1 MG: 1 TABLET ORAL at 08:38

## 2021-10-23 RX ADMIN — DIVALPROEX SODIUM 500 MG: 500 TABLET, DELAYED RELEASE ORAL at 08:38

## 2021-10-23 RX ADMIN — DIVALPROEX SODIUM 500 MG: 500 TABLET, DELAYED RELEASE ORAL at 21:51

## 2021-10-23 RX ADMIN — TOPIRAMATE 25 MG: 25 TABLET, FILM COATED ORAL at 21:51

## 2021-10-23 RX ADMIN — LORAZEPAM 1 MG: 1 TABLET ORAL at 17:05

## 2021-10-23 RX ADMIN — LORAZEPAM 1 MG: 1 TABLET ORAL at 21:51

## 2021-10-23 RX ADMIN — LORAZEPAM 1 MG: 1 TABLET ORAL at 12:12

## 2021-10-23 NOTE — BH NOTES
Patient visible in dayroom sitting watching tv. Denies suicidal ideation or thoughts of self harm. Denies homicidal ideation. Denies hallucinations. Denies anxiety or depression. Mood pleasant and cooperative; although appears guarded as evidenced by walking off and not elaborating in answering questions. Medication compliant.  Will continue to monitor for safety

## 2021-10-23 NOTE — GROUP NOTE
KEANU  GROUP DOCUMENTATION INDIVIDUAL                                                                          Group Therapy Note    Date: 10/23/2021    Group Start Time: 1115  Group End Time: 2790  Group Topic: Education Group - Inpatient    SRM 2  NON ACUTE    Kelly Cushing    [facility] GROUP DOCUMENTATION GROUP    Group Therapy Note    Facilitated discussion focused on the definition and symptoms of anxiety and positive ways to manage symptoms    Attendees: 3/13         Attendance: Attended    Patient's Goal:  Attend group daily     Interventions/techniques: Informed and Supported    Follows Directions:  Followed directions    Interactions: Interacted appropriately    Mental Status: Calm    Behavior/appearance: Cooperative and Needed prompting    Goals Achieved: Able to engage in interactions, Able to listen to others, Able to self-disclose and Discussed coping      Additional Notes:  Receptive to information discussed and was able to recognize different symptoms and shared a positive way to cope such as \"taking a walk\"    Salbador Villanueva

## 2021-10-23 NOTE — BH NOTES
SHONO x 3, out in the milieu, watching TV, attending select groups and interacting with select peers. Affect flat, mood anxious and speech pressured. Stated that he came to the hospital because \"I tried to kill myself taking a bunch of pills but I'm over that now. \"  Denied any auditory and visual hallucination, depression, suicidal and homicidal ideation with poor insight into illness. ETOH level upon admission was 172 mg/dL, discussed practice with him, suggested treatment plan upon discharge and he is in support of getting treatment. He was encouraged to discussed interest in treatment with the social workers and he verbalized understanding. H/o seizure disorder also discussed and he stated \"I been over that, I don't have it anymore but I still take my medicine. \" He was encouraged to continue medication compliance to prevent re-occurence of seizure. Patient verbalized understanding and staff will continue to monitor q 15 minutes, assist and support prn.

## 2021-10-23 NOTE — BH NOTES
Behavioral Health Treatment Team Note     Patient goal(s) for today: \"go outside\"   Treatment team focus/goals: Attend group and continue medication management     Progress note: The pt appeared to be disheveled and in need of some grooming. He described his mood as good and that he slept well. He denied SI/HI/AVH at this time and was oriented x4. He didn't have much to say to the writer and kept his answers short. He was attentive to the writer and his speech was pressured. An in patient level of care is needed at this time for a safe discharge plan and medication management. LOS:  1  Expected LOS: 5-7 Days     Insurance info/prescription coverage: Medicate   Date of last family contact: unknown   Family requesting physician contact today:  no  Discharge plan: Will be coordinated before d/c   Guns in the home:  no   Outpatient provider(s):  Will be coordinated before d/c       Participating treatment team members: Binh Kirkland, * (assigned SW), Robbi Cole, [address]
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
## 2021-10-23 NOTE — GROUP NOTE
IP  GROUP DOCUMENTATION INDIVIDUAL                                                                          Group Therapy Note    Date: 10/23/2021    Group Start Time: 2753  Group End Time: 9260  Group Topic: Reflection/Relaxation    SRM 2 BH NON ACUTE    Josh Wahl    [facility] GROUP DOCUMENTATION GROUP    Group Therapy Note    Facilitated group to introduce relaxation technique as a positive way to cope and manage anxiety and stress    Attendees: 4/13         Attendance: Attended    Patient's Goal:  Attend group daily     Interventions/techniques: Other Relaxation    Follows Directions: Followed directions    Interactions: Interacted appropriately    Mental Status: Calm    Behavior/appearance: Cooperative    Goals Achieved: Able to engage in interactions and Able to listen to others      Additional Notes:  Receptive to relaxation technique. Demonstrated the ability to sit quietly and focus on guided imagery and relaxing music.  Verbalized feeling relaxed    Broderick Spurling, CTRS

## 2021-10-23 NOTE — GROUP NOTE
KEANU  GROUP DOCUMENTATION INDIVIDUAL                                                                          Group Therapy Note    Date: 10/23/2021    Group Start Time: 1018  Group End Time: 9721  Group Topic: Nursing    SRM 2 BEHA TH ACUTE    Williams Shane LPN IP  GROUP DOCUMENTATION GROUP    Group Therapy Note    Attendees: 5/13         Attendance: Attended    Patient's Goal:  *learning ways to De-stress    Interventions/techniques: Informed    Follows Directions: Followed directions    Interactions: Interacted appropriately    Mental Status: Blunted    Behavior/appearance: Disheveled    Goals Achieved: Able to listen to others      Additional Notes:  Pt was verbally active in group.     Jen Sparrow LPN

## 2021-10-24 PROCEDURE — 74011250637 HC RX REV CODE- 250/637: Performed by: PSYCHIATRY & NEUROLOGY

## 2021-10-24 PROCEDURE — 65220000003 HC RM SEMIPRIVATE PSYCH

## 2021-10-24 RX ADMIN — LORAZEPAM 1 MG: 1 TABLET ORAL at 12:17

## 2021-10-24 RX ADMIN — LORAZEPAM 1 MG: 1 TABLET ORAL at 21:00

## 2021-10-24 RX ADMIN — LORAZEPAM 1 MG: 1 TABLET ORAL at 17:12

## 2021-10-24 RX ADMIN — DIVALPROEX SODIUM 500 MG: 500 TABLET, DELAYED RELEASE ORAL at 21:01

## 2021-10-24 RX ADMIN — TOPIRAMATE 25 MG: 25 TABLET, FILM COATED ORAL at 21:00

## 2021-10-24 RX ADMIN — LORAZEPAM 1 MG: 1 TABLET ORAL at 08:38

## 2021-10-24 RX ADMIN — CITALOPRAM HYDROBROMIDE 10 MG: 10 TABLET ORAL at 08:38

## 2021-10-24 RX ADMIN — DIVALPROEX SODIUM 500 MG: 500 TABLET, DELAYED RELEASE ORAL at 08:38

## 2021-10-24 RX ADMIN — TOPIRAMATE 25 MG: 25 TABLET, FILM COATED ORAL at 08:38

## 2021-10-24 NOTE — BH NOTES
Patient se says he took pills to kill himself is not going to kill himself no withdrawal symptoms noted and today he tells me he lives with his sister in [place] when the time comes he can go and no seizures so far. No side effects alert oriented continued inpatient level of care indicated I will add citalopram 20 mg for to help with the depression and he says he is over with the suicidal thoughts.   Continued inpatient level of care indicated pulse 8081 blood pressure 120/7083 no tremulousness no diaphoresis calm polite pleasant en for follow-up chart reviewed saw the patient patient

## 2021-10-24 NOTE — BH NOTES
Behavioral Health Treatment Team Note     Patient goal(s) for today: \"to get out of here\"  Treatment team focus/goals: Attend group and continue medication management     Progress note: The pt presented in a green gown and appeared to be disheveled and in need of some grooming. He described his mood as good and that he was in a better mood than yesterday. He presented a happy mood and affect and was pleasant with the writer. He denied SI/HI/AVH at this time and was oriented x4. When the writer asked him about going to Rehab he said he thought he was in rehab. The writer reminded him of in patient rehab programs and the pt said he didn't need it. His speech was pressured but understandable and he maintained good eye contact. An in patient level of care is needed at this time for a safe discharge plan and medication management. LOS:  2  Expected LOS: 5-7 Days     Insurance info/prescription coverage: Medicate   Date of last family contact: unknown   Family requesting physician contact today:  no  Discharge plan: Will be coordinated before d/c   Guns in the home:  no   Outpatient provider(s):  Will be coordinated before d/c     Participating treatment team members: Rita Blandon, * (assigned SW), Dave Howell, [address]

## 2021-10-24 NOTE — BH NOTES
AAO x 3, in no distress, out in the milieu, interacting with peers, attending and participating in groups. Happy mood disposition. Focused on being discharged tomorrow and was encouraged to discuss discharge with the doctor on rounds today, verbalized understanding. Denied any auditory or visual hallucination, no suicidal or homicidal ideation, depression and anxiety. Encouraged to discuss treatment plan for alcohol abuse history with staff. Will continue to monitor q 15 mins, assist and support prn.

## 2021-10-24 NOTE — GROUP NOTE
IP  GROUP DOCUMENTATION INDIVIDUAL                                                                          Group Therapy Note    Date: 10/24/2021    Group Start Time: 1520  Group End Time: 3373  Group Topic: Recreational/Music Therapy    SRM 2  NON ACUTE    Yisel Derick    IP [facility] GROUP DOCUMENTATION GROUP    Group Therapy Note    Facilitated leisure skills group to reinforce positive coping through music, social interaction, group activities and arts/crafts    Attendees: 5/15         Attendance: Attended    Patient's Goal:  Attend group daily     Interventions/techniques: Art integration and Supported    Follows Directions: Followed directions    Interactions: Interacted appropriately    Mental Status: Calm    Behavior/appearance: Cooperative    Goals Achieved: Able to engage in interactions and Able to listen to others      Additional Notes:  Receptive to listening to music and a song he selected. Interacted with peers and staff.  Declined to work on leisure task    Brittaney Flores, [address]

## 2021-10-24 NOTE — BH NOTES
Pt was smiling and pleasant when coming to speak to him. He appears disheveled and had noted to have dirty clothes and gown on. Pt has been active on the unit, talking to peers, pacing the unit, in and out of his room, the dayroom, hallway. Pt mood appears anxious, he has pressured speech and some flight of ideas. Pt was perseverating over not seeing some 'advisor' he was suppose to see today. He stated he \"I was suppose to get to see an advisor today. I wanted to see her, she was suppose to be here the same time the doctor was. \"  He said she is suppose to be \"trying to help me to get back on track. \"  Pt talks about his overdose stated \"I don't want to kill myself, I'm past all that\" and states he feels much better. Pt has been out to groups today and says \"I had a pretty good day, I sat out and ate, I went to the meetings and they were pretty good. \"  He has been up for meals/snacks and has a good appetite. Pt denies any SI/HI or AVH currently. He denies anxiety/depression. Pt has had no behaviors noted. He reports he has been sleeping good. He had no prn's for sleep. Pt has been medication compliant. Pt has hx of seizure activity and has been taking his medication here, no seizures since arrival.  Pt is also on scheduled Ativan q4hrs. His VSWNL. He continues on 15 min safety checks. Will continue to monitor and treat.

## 2021-10-24 NOTE — GROUP NOTE
[facility] GROUP DOCUMENTATION INDIVIDUAL                                                                          Group Therapy Note    Date: 10/24/2021    Group Start Time: 1120  Group End Time: 1200  Group Topic: Education Group - Inpatient    SRM 2  NON ACUTE    Sachin Heaps    IP [facility] GROUP DOCUMENTATION GROUP    Group Therapy Note    Facilitated  group to introduce the definition and  benefits of diaphragmatic breathing and demonstration of  relaxation technique    Attendees: 5/15         Attendance: Attended    Patient's Goal:  Attend group daily     Interventions/techniques: Informed and Supported    Follows Directions:  Followed directions    Interactions: Interacted appropriately    Mental Status: Calm    Behavior/appearance: Cooperative    Goals Achieved: Able to engage in interactions and Able to listen to others      Additional Notes:  Receptive to information discussed and was able to practice diaphragmatic breathing technique    ARTURO Li

## 2021-10-24 NOTE — PROGRESS NOTES
Problem: Paranoid Ideation  Goal: *LTG: Interact with others without defensiveness or anger  Outcome: Progressing Towards Goal  Goal: *LTG: Verbalize trust of significant other & eliminate accusations of disloyalty  Outcome: Progressing Towards Goal     Problem: Suicide  Goal: *STG: Remains safe in hospital  Outcome: Progressing Towards Goal

## 2021-10-25 PROCEDURE — 65220000003 HC RM SEMIPRIVATE PSYCH

## 2021-10-25 PROCEDURE — 74011250637 HC RX REV CODE- 250/637: Performed by: PSYCHIATRY & NEUROLOGY

## 2021-10-25 RX ADMIN — TOPIRAMATE 25 MG: 25 TABLET, FILM COATED ORAL at 21:27

## 2021-10-25 RX ADMIN — CITALOPRAM HYDROBROMIDE 10 MG: 10 TABLET ORAL at 09:13

## 2021-10-25 RX ADMIN — LORAZEPAM 1 MG: 1 TABLET ORAL at 13:31

## 2021-10-25 RX ADMIN — TOPIRAMATE 25 MG: 25 TABLET, FILM COATED ORAL at 09:13

## 2021-10-25 RX ADMIN — LORAZEPAM 1 MG: 1 TABLET ORAL at 17:31

## 2021-10-25 RX ADMIN — LORAZEPAM 1 MG: 1 TABLET ORAL at 21:27

## 2021-10-25 RX ADMIN — DIVALPROEX SODIUM 500 MG: 500 TABLET, DELAYED RELEASE ORAL at 09:13

## 2021-10-25 RX ADMIN — DIVALPROEX SODIUM 500 MG: 500 TABLET, DELAYED RELEASE ORAL at 21:27

## 2021-10-25 RX ADMIN — LORAZEPAM 1 MG: 1 TABLET ORAL at 09:13

## 2021-10-25 NOTE — BH NOTES
Writer spoke with pt briefly. He stated that he wants to go home and is 'feeling much better'. Writer explained TDO prescreen process and pt reported that he feels he should be able to leave since he came in voluntarily. Writer attempted to call the pts sister but phone is disconnected. Sairar contacted Dr Chuck Jain who asked to contact D19.  Sairar contacted Mira at [facility] and faxed clinicals

## 2021-10-25 NOTE — PROGRESS NOTES
Spiritual Care Assessment/Progress Note  [facility]      NAME: Darby Guevara      MRN: [id number]  AGE: 62 y.o.  SEX: male  Tenriism Affiliation: Gnosticist   Language: English     10/25/2021     Total Time (in minutes): 15     Spiritual Assessment begun in [facility] 2 BEHA [facility] ACUTE through conversation with:         [x]Patient        [] Family    [] Friend(s)        Reason for Consult: Initial/Spiritual assessment, patient floor     Spiritual beliefs: (Please include comment if needed)     [x] Identifies with a cullen tradition: Gnosticist         [] Supported by a cullen community:            [] Claims no spiritual orientation:           [] Seeking spiritual identity:                [] Adheres to an individual form of spirituality:           [] Not able to assess:                           Identified resources for coping:      [] Prayer                               [] Music                  [] Guided Imagery     [x] Family/friends                 [] Pet visits     [] Devotional reading                         [] Unknown     [] Other:                                              Interventions offered during this visit: (See comments for more details)    Patient Interventions: Affirmation of cullen, Catharsis/review of pertinent events in supportive environment, Coping skills reviewed/reinforced, Iconic (affirming the presence of God/Higher Power)           Plan of Care:     [] Support spiritual and/or cultural needs    [] Support AMD and/or advance care planning process      [] Support grieving process   [] Coordinate Rites and/or Rituals    [] Coordination with community clergy   [] No spiritual needs identified at this time   [] Detailed Plan of Care below (See Comments)  [] Make referral to Music Therapy  [] Make referral to Pet Therapy     [] Make referral to Addiction services  [] Make referral to [facility]  [] Make referral to Spiritual Care Partner  [] No future visits requested [x] Follow up upon further referrals     Comments:  visit for initial spiritual assessment. Patient requested  visit per In Basket request.  Provided spiritual presence and listening as he spoke of his present thoughts, feelings, and concerns. Spoke of the events leading to this hospitalization. Appears a little confused at times. Answers questions appropriately. Says he is feeling good and hopes to return home. Appeared comforted and encouraged as a result of this visit and expressed gratitude for this visit. Informed patient of availability of  and pastoral care staff. Rev.  Marissa Lo, MDiv, [facility], [facility]   paging service: [phone])

## 2021-10-25 NOTE — BH NOTES
Behavioral Health Treatment Team Note     Patient goal(s) for today: Get better  Treatment team focus/goals: Medication management    Progress note: Pt was walking in the hallway by himself when writer approached and introduced himself. Pt was poorly groomed and and wearing street attire while wrapped in a hospital gown. Pt presented with a calm affect and congruent mood. Pt denies any SI/HI and AVH. Pt admitted to taking too much of his prescription medications, but denies attempting suicide. Pt said that he made a mistake because he has a history of using hard drugs and felt that he was going to bump up his medication to obtain the same effect. Inpatient level of care needed for medication management and group therapy. LOS:  3  Expected LOS: 5-7    Insurance info/prescription [facility]   Date of last family contact:  Unknown  Family requesting physician contact today:  no  Discharge plan:  Pt will work with  to determine options   Guns in the home:  no   Outpatient provider(s):  Unknown    Participating treatment team members: Rita Blandon, * (assigned SW intern), Josy Kirkpatrick.  Darlene Hoyt

## 2021-10-25 NOTE — BH NOTES
TDO Disposition     D19 Shahla reported that pt is going to be a TDO due to lack of capacity. Shahla reported that pt is very internally preoccupied and could not state what year or month they are in.  Writer informed [address]

## 2021-10-25 NOTE — BH NOTES
Pt.is up and visible on unit,affect is flat,appetite good, appearance is disheveled,denies feeling suicidal, denies feeling depressed, pleasant upon approach,pt.is preoccupied at times,noticeable thought blocking, speech garbled at times, poor insight and judgement. no signs of DTs ,no seizure activity at present. no other concerns voiced,remains on close observation.

## 2021-10-25 NOTE — GROUP NOTE
KEANU  GROUP DOCUMENTATION INDIVIDUAL                                                                          Group Therapy Note    Date: 10/25/2021    Group Start Time: 1130  Group End Time: 1200  Group Topic: Education Group - Inpatient    [facility] 2  NON ACUTE    Aurelio Escobedo    [facility] GROUP DOCUMENTATION GROUP    Group Therapy Note    Facilitated discussion focused on learning to explore and communicate feelings    Attendees: 7/15         Attendance: Attended    Patient's Goal:  Attend group daily     Interventions/techniques: Informed and Supported    Follows Directions: Followed directions    Interactions: Interacted appropriately    Mental Status: Calm    Behavior/appearance: Cooperative    Goals Achieved: Able to engage in interactions, Able to listen to others and Able to self-disclose      Additional Notes: Receptive to information and engaged in discussion. Pt was able to share what make him  feel a certain way.  Pt shared he feel happy \"when he is working\" and feel sad when he \"don't do a job right\"    Thierno Morning

## 2021-10-25 NOTE — GROUP NOTE
IP  GROUP DOCUMENTATION INDIVIDUAL                                                                          Group Therapy Note    Date: 10/25/2021    Group Start Time: 1394  Group End Time: 5483  Group Topic: Process Group - Inpatient    SRM CARE MANAGEMENT    Maryana Slaughter BSW    [facility] GROUP DOCUMENTATION GROUP    Group Therapy Note    The facilitator encouraged the group to talk about their time at the hospital and opened up the group for discussion. As well as encouraged one another to promote feedback and support. Attendees: 5/15          Attendance: Attended    Patient's Goal:  Attend group     Interventions/techniques: Supported    Follows Directions: Followed directions    Interactions: Interacted appropriately    Mental Status: Calm, Flat and Restricted    Behavior/appearance: Disheveled, Grooming impaired, Needed prompting and Pressured speech    Goals Achieved: Able to engage in interactions, Able to listen to others, Able to give feedback to another and Able to receive feedback      Additional Notes: The pts speech was very pressured but he participated in group and had feedback for all of the questions that were asked.       DOMINIQUE Payan

## 2021-10-25 NOTE — BH NOTES
Patient seen for follow-up chart reviewed patient vital signs are stable  Labs resulted he had a valproic acid done on 22 October 3 of course he was not taking medication. He is calm polite denies suicidal thought hallucination paranoia and says feeling better mood better hopeful 1 day he will go to be discharged and go and stay with his sister no seizures continued inpatient level of care indicated and failures stabilization is safe discharge and is the proper follow-up at [address] [place] [address].

## 2021-10-25 NOTE — GROUP NOTE
[facility] GROUP DOCUMENTATION INDIVIDUAL                                                                          Group Therapy Note    Date: 10/25/2021    Group Start Time: 6661  Group End Time: 1600  Group Topic: Reflection/Relaxation    SRM CARE MANAGEMENT    Cathlene Lights    IP [address] [facility] GROUP DOCUMENTATION GROUP    Group Therapy Note  Pt were encouraged to select music that they felt brought them a sense of calm or piece. Pt's were also encouraged to describe their emotions that were brought about with certain genres of music. Pt's described how music played a role in their recovery and allowed them to escape from situations that were triggers. Attendees: 6/11         Attendance: Attended    Patient's Goal:  Be happy     Interventions/techniques: Challenged, Informed, Validated and Supported    Follows Directions: Followed directions    Interactions: Interacted appropriately    Mental Status: Preoccupied and Worried    Behavior/appearance: Poor eye contact and Withdrawn/quiet    Goals Achieved: Able to listen to others, Able to receive feedback and Identified feelings      Additional Notes:  Pt talked about how she preferred to listen to songs that were Slovak and upbeat.     Valente Bernabe

## 2021-10-25 NOTE — BH NOTES
Patient calm and cooperative. Denies SI,HI but states he hears voices. Denies the voices being command hallucinations. Thoughts are selectively disorganized. Speech-garbled. Cherelle Ports is not clear or concise. Patient talks of his Higher Power. Med-diet compliant. CIWA-negative. No s/sx of any ETOH withdrawal. Denies pain. Comfortable.

## 2021-10-26 PROCEDURE — 74011250637 HC RX REV CODE- 250/637: Performed by: PSYCHIATRY & NEUROLOGY

## 2021-10-26 PROCEDURE — 65220000003 HC RM SEMIPRIVATE PSYCH

## 2021-10-26 RX ADMIN — CITALOPRAM HYDROBROMIDE 10 MG: 10 TABLET ORAL at 08:08

## 2021-10-26 RX ADMIN — LORAZEPAM 1 MG: 1 TABLET ORAL at 17:42

## 2021-10-26 RX ADMIN — LORAZEPAM 1 MG: 1 TABLET ORAL at 08:07

## 2021-10-26 RX ADMIN — TOPIRAMATE 25 MG: 25 TABLET, FILM COATED ORAL at 08:07

## 2021-10-26 RX ADMIN — LORAZEPAM 1 MG: 1 TABLET ORAL at 21:33

## 2021-10-26 RX ADMIN — LORAZEPAM 1 MG: 1 TABLET ORAL at 12:21

## 2021-10-26 RX ADMIN — HYDROXYZINE HYDROCHLORIDE 50 MG: 50 TABLET, FILM COATED ORAL at 00:05

## 2021-10-26 RX ADMIN — TOPIRAMATE 25 MG: 25 TABLET, FILM COATED ORAL at 21:32

## 2021-10-26 RX ADMIN — DIVALPROEX SODIUM 500 MG: 500 TABLET, DELAYED RELEASE ORAL at 08:08

## 2021-10-26 RX ADMIN — DIVALPROEX SODIUM 500 MG: 500 TABLET, DELAYED RELEASE ORAL at 21:33

## 2021-10-26 NOTE — GROUP NOTE
KEANU  GROUP DOCUMENTATION INDIVIDUAL                                                                          Group Therapy Note    Date: 10/26/2021    Group Start Time: 1192  Group End Time: 1500  Group Topic: AA/NA    SRM 2 BEHA HLTH ACUTE    Irasema Gonsales    IP [address] [facility] GROUP DOCUMENTATION GROUP    Group Therapy Note    Attendees: 5/9    AA/NA: pts were encouraged to share what substance they struggle with as a check in question. Pts were then encouraged to share their experiences with substance use, reason for using and what they want in the future and how to work towards that. Pts were encouraged to provide positive feedback to one another. Pts were then encouraged to reflect on group         Attendance: Attended    Patient's Goal:  Attend activities and groups     Interventions/techniques: Validated, Promoted peer support, Provide feedback and Supported    Follows Directions: Followed directions    Interactions: Interacted appropriately    Mental Status: Calm, Congruent and Flat    Behavior/appearance: Attentive, Neatly groomed, Needed prompting and Withdrawn/quiet    Goals Achieved: Able to engage in interactions, Able to listen to others, Able to reflect/comment on own behavior and Able to manage/cope with feelings      Additional Notes:  Pt was quiet throughout most of group but listened appropriately. Pt reported that at first he did not think he needed help but now he does want IOP.  Pt is making progress towards goals by attending and participating in group    [facility]

## 2021-10-26 NOTE — BH NOTES
Patient alert and oriented to self. Ambulating in hallway, room and solarium, socializing with roommate. Cooperative and pleasant. Took medications as ordered. Served TDO per . Denies suicidal or homicidal ideations, denies auditory or visual hallucinations. No tremors, headache, nausea, vomiting noted. Patient stood near the door and asked to leave, writer explained TDO and advised patient to go to room and rest, patient went to room. Will continue to monitor.
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
## 2021-10-26 NOTE — GROUP NOTE
IP  GROUP DOCUMENTATION INDIVIDUAL                                                                          Group Therapy Note    Date: 10/26/2021    Group Start Time: 1125  Group End Time: 1210  Group Topic: Education Group - Inpatient    [facility] 2  NON ACUTE    Radha Pierson    IP 1[address] [facility] GROUP DOCUMENTATION GROUP    Group Therapy Note    Facilitated discussion focused on defining and recognizing examples of different automatic negative thoughts and how they affect moods and behaviors    Attendees: 5/12         Attendance: Attended    Patient's Goal:  Attend group daily     Interventions/techniques: Informed and Supported    Follows Directions:  Followed directions    Interactions: Interacted appropriately    Mental Status: Calm    Behavior/appearance: Cooperative    Goals Achieved: Able to engage in interactions and Able to listen to others      Additional Notes: Receptive to information discussed and was able to recognize he  has engaged in some of them such as \"jumping to conclusions\"     Twin Reed

## 2021-10-26 NOTE — PROGRESS NOTES
Problem: Paranoid Ideation  Goal: *LTG: Report reduced vigilance & suspicion around others as well as more relaxed, trusting, & open interaction  Outcome: Progressing Towards Goal     Problem: Paranoid Ideation  Goal: *STG: Acknowledge that belief about others being threatening is based on more subjective interpretation than on objective data  Outcome: Progressing Towards Goal     Problem: Paranoid Ideation  Goal: *STG: Verbalize trust of significant other & feel relaxed when not in his/her presence  Outcome: Progressing Towards Goal

## 2021-10-26 NOTE — BH NOTES
Behavioral Health Treatment Team Note     Patient goal(s) for today: \"go home\"   Treatment team focus/goals: Attend group and continue medication management     Progress note: The pt presented in a green gown and was standing in front of the day room. He described his mood as \"alright\" and presented a calm mood with a happy affect. It appears that the pt continues to have poor insight into his situation at the hospital and main goal is to go home. He denied SI/HI/AVH at this time and was oriented x3. His speech was pressured and the writer had a hard time understanding what the pt was saying. Inpatient level of care needed for medication management and group therapy.        LOS:  4  Expected LOS: Hearing on 10/27    Insurance info/prescription coverage: [facility]   Date of last family contact:  Unknown  Family requesting physician contact today:  no  Discharge plan:  Pt will work with  to determine options   Guns in the home:  no   Outpatient provider(s):  Unknown  Participating treatment team members: Christina Nielsen, * (assigned SW), NIA ALONZO, [facility]

## 2021-10-26 NOTE — BH NOTES
Alert, oriented to self and place. Does not know time or date. Speech can be difficult to understand at times. Affect and mood are euthymic. Denies SI/HI a this time. No AVH noted or reported. Medication and meal compliant. Visible in day room. Watching TV. Pleasant and cooperative. No complaints voiced at this time. Discussed importance of slip resistant socks as patient has shuffling gait at times. Remains on close observation for safety, continue to monitor.

## 2021-10-26 NOTE — BH NOTES
Patient seen for follow-up and discussed in the treatment team need to get the feedback and collateral information with the his sister in [place] is pushing for discharge. Later in the morning he is alert verbal but some degree of perplexity speech some disorganization rambling noted and made progress not suicidal not homicidal not agitated not aggressive but want to leave because he is voluntary and patient made progress but not steady daily not ready to leave a D 19 was called for consideration of TDO apparently they are against getting a TDO continued inpatient level of care indicated taking medications vital signs are stable labs reviewed no new labs resulted and Depakote from October 22 was only 3 of course he was not taking now he is been taking medication continued inpatient level of care indicated thank you for further.   Of stabilization and stability of the progress

## 2021-10-26 NOTE — GROUP NOTE
[facility] GROUP DOCUMENTATION INDIVIDUAL                                                                          Group Therapy Note    Date: 10/26/2021    Group Start Time: 4539  Group End Time: 1400  Group Topic: Process Group - Inpatient    [facility] 2 [facility] ACUTE    Danay Horse    IP [address] [facility] GROUP DOCUMENTATION GROUP    Group Therapy Note  The therapist facilitated a group by encouraging the patient's to share about their thoughts and feelings. Attendees: 4         Attendance: Attended    Patient's Goal:  To attend groups and activities     Interventions/techniques: Validated and Supported    Follows Directions: Followed directions    Interactions: Interacted appropriately    Mental Status: Calm    Behavior/appearance: Attentive and Cooperative    Goals Achieved: Able to engage in interactions, Able to listen to others, Able to reflect/comment on own behavior, Able to self-disclose and Identified relapse prevention strategies      Additional Notes: The pt participated well in group. He shared a lot about his alcohol use and wanting to stop. He said that he would be interested in doing an IOP and attend AA meetings again. He recognized that he needs to stop associating with certain people in his life who he uses with too.      Julia Escobar

## 2021-10-27 PROCEDURE — 74011250637 HC RX REV CODE- 250/637: Performed by: PSYCHIATRY & NEUROLOGY

## 2021-10-27 PROCEDURE — 65220000003 HC RM SEMIPRIVATE PSYCH

## 2021-10-27 RX ADMIN — DIVALPROEX SODIUM 500 MG: 500 TABLET, DELAYED RELEASE ORAL at 21:00

## 2021-10-27 RX ADMIN — LORAZEPAM 1 MG: 1 TABLET ORAL at 17:07

## 2021-10-27 RX ADMIN — TOPIRAMATE 25 MG: 25 TABLET, FILM COATED ORAL at 08:19

## 2021-10-27 RX ADMIN — LORAZEPAM 1 MG: 1 TABLET ORAL at 13:28

## 2021-10-27 RX ADMIN — ACETAMINOPHEN 650 MG: 325 TABLET ORAL at 08:19

## 2021-10-27 RX ADMIN — CITALOPRAM HYDROBROMIDE 10 MG: 10 TABLET ORAL at 08:19

## 2021-10-27 RX ADMIN — LORAZEPAM 1 MG: 1 TABLET ORAL at 08:19

## 2021-10-27 RX ADMIN — TOPIRAMATE 25 MG: 25 TABLET, FILM COATED ORAL at 21:00

## 2021-10-27 RX ADMIN — HYDROXYZINE HYDROCHLORIDE 50 MG: 50 TABLET, FILM COATED ORAL at 08:19

## 2021-10-27 RX ADMIN — LORAZEPAM 1 MG: 1 TABLET ORAL at 21:00

## 2021-10-27 RX ADMIN — DIVALPROEX SODIUM 500 MG: 500 TABLET, DELAYED RELEASE ORAL at 08:19

## 2021-10-27 NOTE — BH NOTES
TDO DISPOSITION    Pt committed for up to 10 days on 10/27/21 per Delmer Jackson, represented by Ms. Mauro. Paperwork placed on pt's chart.
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
## 2021-10-27 NOTE — BH NOTES
Patient case discussed with treatment team patient seen doing team initially treated will have a hearing on Wednesday patient is today calmer subdued polite not pushing for discharge but willing to go into some partial hospitalization program IOP check with the family for collateral information and also for consideration of discharge planning whether he is welcome to go back on his vital signs are stable no side effect from medication and continued inpatient level of care indicated thank you end of dictation

## 2021-10-27 NOTE — PROGRESS NOTES
Problem: Falls - Risk of  Goal: *Absence of Falls  Description: Document [place] Flow Fall Risk and appropriate interventions in the flowsheet.   Outcome: Progressing Towards Goal  Note: Fall Risk Interventions:                                Problem: Suicide  Goal: *STG: Remains safe in hospital  Outcome: Progressing Towards Goal  Goal: *STG/LTG: Complies with medication therapy  Outcome: Progressing Towards Goal

## 2021-10-27 NOTE — PROGRESS NOTES
Problem: Falls - Risk of  Goal: *Absence of Falls  Description: Document [place] Fall Risk and appropriate interventions in the flowsheet. Outcome: Progressing Towards Goal  Note: Fall Risk Interventions: Pt instructed to rise slowly, and to wear non skid socks.                                 Problem: Suicide  Goal: *STG: Remains safe in hospital  Outcome: Progressing Towards Goal  Goal: *STG: Attends activities and groups  Outcome: Progressing Towards Goal

## 2021-10-27 NOTE — BH NOTES
Patient denies SI/HI, AVH, He denies anxiety and depression. Patient was visible in day room, looking at television and talking to peers. He was cooperative during shift and medication compliant. Pt remained on close observation for safety.
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
## 2021-10-27 NOTE — GROUP NOTE
[facility] GROUP DOCUMENTATION INDIVIDUAL                                                                          Group Therapy Note    Date: 10/27/2021    Group Start Time: 7878  Group End Time: 1400  Group Topic: Education Group - Inpatient    [facility] CARE MANAGEMENT    Kendra Meeks    IP [address]150 [facility] GROUP DOCUMENTATION GROUP    Group Therapy Note Pt was encouraged to discuss various triggers and how they managed them in the past. Pt were also supported as they talked about traumatic experiences that exacerbated their mental illness and taught new skills to aid them in coping. Pt practice their new skills in the form of role playing activities. .     Attendees: 6/12         Attendance: Attended    Patient's Goal:  Get better    Interventions/techniques: Informed, Validated and Promoted peer support    Follows Directions:  Followed directions    Interactions: Interacted appropriately    Mental Status: Calm, Congruent and Happy    Behavior/appearance: Attentive, Caretaking and Motivated    Goals Achieved: Able to listen to others, Able to give feedback to another, Displayed empathy and Identified feelings      Additional Notes:  Pt attempted to support other members of the group     [employer]

## 2021-10-27 NOTE — BH NOTES
Pt denies A, D, SI/HI, AVH. Pt is med compliant, has a good appetite, and participates in groups. Pt up in milieu associating with peers.

## 2021-10-28 PROCEDURE — 74011250637 HC RX REV CODE- 250/637: Performed by: PSYCHIATRY & NEUROLOGY

## 2021-10-28 PROCEDURE — 65220000003 HC RM SEMIPRIVATE PSYCH

## 2021-10-28 RX ORDER — LORAZEPAM 1 MG/1
1 TABLET ORAL
Status: DISCONTINUED | OUTPATIENT
Start: 2021-10-28 | End: 2021-11-01 | Stop reason: HOSPADM

## 2021-10-28 RX ADMIN — TOPIRAMATE 25 MG: 25 TABLET, FILM COATED ORAL at 21:16

## 2021-10-28 RX ADMIN — LORAZEPAM 1 MG: 1 TABLET ORAL at 09:02

## 2021-10-28 RX ADMIN — LORAZEPAM 1 MG: 1 TABLET ORAL at 21:16

## 2021-10-28 RX ADMIN — TOPIRAMATE 25 MG: 25 TABLET, FILM COATED ORAL at 09:01

## 2021-10-28 RX ADMIN — CITALOPRAM HYDROBROMIDE 10 MG: 10 TABLET ORAL at 09:02

## 2021-10-28 RX ADMIN — DIVALPROEX SODIUM 500 MG: 500 TABLET, DELAYED RELEASE ORAL at 21:16

## 2021-10-28 RX ADMIN — LORAZEPAM 1 MG: 1 TABLET ORAL at 12:32

## 2021-10-28 RX ADMIN — DIVALPROEX SODIUM 500 MG: 500 TABLET, DELAYED RELEASE ORAL at 09:02

## 2021-10-28 NOTE — PROGRESS NOTES
Problem: Suicide  Goal: *STG: Remains safe in hospital  10/27/2021 2351 by John Goins  Outcome: Progressing Towards Goal  10/27/2021 2349 by Delilah Gonzalez  Outcome: Progressing Towards Goal  Goal: *STG: Seeks staff when feelings of self harm or harm towards others arise  Outcome: Progressing Towards Goal  Goal: *STG: Attends activities and groups  Outcome: Progressing Towards Goal  Goal: *STG/LTG: Complies with medication therapy  10/27/2021 2351 by Delilah Gonzalez  Outcome: Progressing Towards Goal  10/27/2021 2349 by Delilah Gonzalez  Outcome: Progressing Towards Goal

## 2021-10-28 NOTE — GROUP NOTE
[facility] Medicine Services  DISCHARGE SUMMARY    Patient Name: Marcela Ramírez  : 1966  MRN: [id number]    Date of Admission: 2021  4:14 AM  Date of Discharge:  21  Primary Care Physician: Sukhwinder Acosta MD    Consults     No orders found for last 30 day(s).          Hospital Course     Presenting Problem:   Hypertensive emergency [I16.1]    Active Hospital Problems    Diagnosis  POA   • **Hypertensive emergency [I16.1]  Yes     Priority: Medium   • Hypothyroidism [E03.9]  Yes   • Type 2 diabetes mellitus (CMS/HCC) [E11.9]  Yes   • Hyperlipidemia [E78.5]  Yes   • Hypertension [I10]  Yes   • Bipolar 1 disorder (CMS/HCC) [F31.9]  Yes   • Anxiety [F41.9]  Yes   • Depression [F32.9]  Yes      Resolved Hospital Problems   No resolved problems to display.          Hospital Course:  Marcela Ramírez is a 55 y.o. female with history of hypertension, hyperlipidemia, CKD, type 2 diabetes, obesity status post gastric sleeve surgery who presented to the emergency room with complaints of fatigue, dizziness, and headache. Has had significantly elevated blood pressure at home. There is also concern of  possible COVID-19 exposure but she tested negative on arrival. She was significantly hypertensive on arrival to 227/125. Initially started on a Cardene drip and admitted to the hospitalist service. Says she has been compliant with her lisinopril and hydrochlorothiazide. Norvasc was added. Cardene drip was able to be weaned and this morning her blood pressure mostly been in the 150s over 90s. While that is not at goal it is much improved. Plan will be to discharge on her previous doses of lisinopril and hydrochlorothiazide as well as 5 mg of Norvasc. She will follow up with Dr. Henry wesley for further titration. She has a home blood pressure cuff at home she was instructed to keep a log of her blood pressures to take in.  Also noted to have a TSH of 9. We discussed  KEANU  GROUP DOCUMENTATION INDIVIDUAL                                                                          Group Therapy Note    Date: 10/28/2021    Group Start Time: 8974  Group End Time: 1400  Group Topic: Process Group - Inpatient    [facility] CARE MANAGEMENT    David COLUNGA [facility] GROUP DOCUMENTATION GROUP    Group Therapy Note  Patients were encouraged to talk about stressors they are currently experiencing. Pts expressed past trauma and hardships they have experienced. Pts then talked about goals and ambitions they have for the future. Pts were encouraged to self disclose, provide peer support, and discuss coping mechanisms. Attendees: 4/12         Attendance: Attended    Patient's Goal:  Pt did not respond to check in question. Interventions/techniques: Challenged, Informed, Validated, Promoted peer support and Supported    Follows Directions: Unable to follow directions    Interactions: Unable to interact    Mental Status: Congruent and Flat    Behavior/appearance: Withdrawn/quiet    Goals Achieved: Able to engage in interactions, Able to listen to others, Able to give feedback to another, Able to reflect/comment on own behavior, Able to manage/cope with feelings, Able to self-disclose and Discussed coping      Additional Notes:  Pt was encouraged to participate in the group, but appeared fatigued and exhausted. At times pt was falling asleep. Pt was able to contribute some, and relate to his peers. Pt provided peer support at times. Pt was polite.      Serene Baez risk versus benefits and suspect she is hypothyroid based on symptoms. Therefore we will start on 50 mcg dose of Synthroid. Recommend follow-up TSH and 4 to 6 weeks to make sure dose is appropriate, this was discussed with patient.      Discharge Follow Up Recommendations for outpatient labs/diagnostics:  Recommend follow-up TSH in 4 to 6 weeks with primary care doctor    Day of Discharge     HPI:   Feeling much better today. Has remained off Cardene drip and blood pressure has been in the 140s to 160s range systolic. Feels back to baseline and is ready for home.    Review of Systems  Gen- No fevers, chills  CV- No chest pain, palpitations  Resp- No cough, dyspnea  GI- No N/V/D, abd pain    Vital Signs:   Temp:  [98.3 °F (36.8 °C)-98.9 °F (37.2 °C)] 98.3 °F (36.8 °C)  Heart Rate:  [57-80] 79  Resp:  [18] 18  BP: (134-159)/(75-97) 153/97     Physical Exam:  Constitutional: No acute distress, awake, alert, sitting up in bed playing on her phone  HENT: NCAT, mucous membranes moist  Respiratory: Clear to auscultation bilaterally, respiratory effort normal   Cardiovascular: RRR, no murmurs, rubs, or gallops  Gastrointestinal: Obese, positive bowel sounds, soft, nontender, nondistended  Musculoskeletal: No bilateral ankle edema  Psychiatric: Appropriate affect, cooperative  Neurologic: Oriented x 3, no focal deficits  Skin: No rashes      Pertinent  and/or Most Recent Results     LAB RESULTS:      Lab 09/07/21  2315 09/07/21 2032   WBC  --  10.29   HEMOGLOBIN  --  12.7   HEMATOCRIT  --  38.0   PLATELETS  --  192   NEUTROS ABS  --  6.27   IMMATURE GRANS (ABS)  --  0.04   LYMPHS ABS  --  2.97   MONOS ABS  --  0.63   EOS ABS  --  0.34   MCV  --  96.0   D DIMER QUANT <0.27  --          Lab 09/09/21  0130 09/08/21  0431 09/07/21 2032   SODIUM  --   --  140   POTASSIUM 4.5  --  3.4*   CHLORIDE  --   --  100   CO2  --   --  30.0*   ANION GAP  --   --  10.0   BUN  --   --  16   CREATININE  --   --  1.16*   GLUCOSE  --   --  91    CALCIUM  --   --  9.4   MAGNESIUM  --  2.1  --    TSH  --  9.740*  --          Lab 09/07/21 2032   TOTAL PROTEIN 7.5   ALBUMIN 4.40   GLOBULIN 3.1   ALT (SGPT) 16   AST (SGOT) 15   BILIRUBIN 0.2   ALK PHOS 97   LIPASE 19         Lab 09/08/21 0431 09/07/21 2032   PROBNP  --  123.3   TROPONIN T <0.010 <0.010                 Brief Urine Lab Results  (Last result in the past 365 days)      Color   Clarity   Blood   Leuk Est   Nitrite   Protein   CREAT   Urine HCG        09/08/21 0628 Yellow Clear Negative Negative Negative Negative             Microbiology Results (last 10 days)     Procedure Component Value - Date/Time    COVID PRE-OP / PRE-PROCEDURE SCREENING ORDER (NO ISOLATION) - Swab, Nasopharynx [[id number]]  (Normal) Collected: 09/08/21 0433    Lab Status: Final result Specimen: Swab from Nasopharynx Updated: 09/08/21 0551    Narrative:      The following orders were created for panel order COVID PRE-OP / PRE-PROCEDURE SCREENING ORDER (NO ISOLATION) - Swab, Nasopharynx.  Procedure                               Abnormality         Status                     ---------                               -----------         ------                     COVID-19,CEPHEID/LUIS,LE...[[id number]]  Normal              Final result                 Please view results for these tests on the individual orders.    COVID-19,CEPHEID/LUIS,GRACY IN-HOUSE(OR EMERGENT/ADD-ON),NP SWAB IN TRANSPORT MEDIA 3-4 HR TAT - Swab, Nasopharynx [[id number]]  (Normal) Collected: 09/08/21 0433    Lab Status: Final result Specimen: Swab from Nasopharynx Updated: 09/08/21 0551     COVID19 Not Detected    Narrative:      Fact sheet for providers: https://www.fda.gov/media/988974/download     Fact sheet for patients: https://www.fda.gov/media/100511/download  Fact sheet for providers: https://www.fda.gov/media/812911/download     Fact sheet for patients: https://www.fda.gov/media/061664/download          XR Chest 1 View    Result Date: 9/7/2021  CR Chest 1  Vw INDICATION: Chest pain. History of hypertension and asthma. COMPARISON:  8/23/2019 FINDINGS: Portable AP view(s) of the chest.  Mild cardiomegaly without failure. No infiltrates or effusions. No pneumothorax. Osseous structures unremarkable.     No acute cardiopulmonary findings. Signer Name: PORTIA Tim MD  Signed: 9/7/2021 11:35 PM  Workstation Name: [facility]  [facility]                    Discharge Details        Discharge Medications      New Medications      Instructions Start Date   amLODIPine 5 MG tablet  Commonly known as: NORVASC   5 mg, Oral, Every 24 Hours Scheduled      levothyroxine 50 MCG tablet  Commonly known as: SYNTHROID, LEVOTHROID   50 mcg, Oral, Every Early Morning         Changes to Medications      Instructions Start Date   ALPRAZolam 1 MG tablet  Commonly known as: Xanax  What changed:   · when to take this  · reasons to take this   1 mg, Oral, Nightly      lamoTRIgine 200 MG tablet  Commonly known as: LaMICtal  What changed:   · how to take this  · when to take this   TAKE ONE TABLET BY MOUTH ONCE NIGHTLY         Continue These Medications      Instructions Start Date   atorvastatin 20 MG tablet  Commonly known as: LIPITOR   TAKE ONE TABLET BY MOUTH DAILY      desvenlafaxine 50 MG 24 hr tablet  Commonly known as: PRISTIQ   TAKE ONE TABLET BY MOUTH DAILY      hydroCHLOROthiazide 25 MG tablet  Commonly known as: HYDRODIURIL   25 mg, Oral, Daily      lisinopril 20 MG tablet  Commonly known as: PRINIVIL,ZESTRIL   No dose, route, or frequency recorded.      metFORMIN  MG 24 hr tablet  Commonly known as: GLUCOPHAGE-XR   TAKE ONE TABLET BY MOUTH TWICE A DAY BEFORE MEALS      Neupro 1 MG/24HR patch 24 hour 24 hour patch  Generic drug: rotigotine   APPLY ONE PATCH TO THE SKIN DAILY AS DIRECTED BY PROVIDER      omeprazole 20 MG capsule  Commonly known as: priLOSEC   TAKE ONE CAPSULE BY MOUTH DAILY      ONE TOUCH ULTRA TEST test strip  Generic drug: glucose  blood   TEST GLUCOSE TWO TIMES A DAY      pregabalin 50 MG capsule  Commonly known as: Lyrica   50 mg, Oral, 3 Times Daily         Stop These Medications    methylPREDNISolone 4 MG dose pack  Commonly known as: MEDROL     promethazine-dextromethorphan 6.25-15 MG/5ML syrup  Commonly known as: PROMETHAZINE-DM            Allergies   Allergen Reactions   • Contrast Dye Swelling     Nasal congestion/ snot, IV contrast only         Discharge Disposition:  Home or Self Care    Diet:  Hospital:  Diet Order   Procedures   • Diet Regular; Cardiac, Consistent Carbohydrate       Activity:  Activity Instructions     Activity as Tolerated               CODE STATUS:    Code Status and Medical Interventions:   Ordered at: 09/08/21 0554     Code Status:    CPR     Medical Interventions (Level of Support Prior to Arrest):    Full       Future Appointments   Date Time Provider Department Center   9/13/2021 11:15 AM Maribel Hernandez, PhD [facility] None   9/20/2021 10:30 AM Maribel Hernandez PhD [facility] None   9/27/2021 11:15 AM Maribel Hernandez PhD [facility] None   10/4/2021 11:15 AM Maribel Hernandez PhD [facility] None   10/11/2021  9:45 AM Maribel Hernandez PhD [facility] None   10/18/2021 11:15 AM Maribel Hernandez PhD [facility] None   10/25/2021 11:15 AM Maribel Hernandez PhD [facility] None   10/25/2021  1:00 PM Jeanne Saenz MD [facility]   2/8/2022 11:00 AM Gladys Wilder PA-C [facility]       Additional Instructions for the Follow-ups that You Need to Schedule     Discharge Follow-up with PCP   As directed       Currently Documented PCP:    Sukhwinder Acosta MD    PCP Phone Number:    [phone]     Follow Up Details: PCP 1 week               Wilber Akers MD  09/09/21      Time Spent on Discharge:  I spent 28  minutes on this discharge activity which included: face-to-face encounter with the patient, reviewing the data in the system, coordination of the care with the nursing staff as well as  consultants, documentation, and entering orders.

## 2021-10-28 NOTE — GROUP NOTE
KEANU  GROUP DOCUMENTATION INDIVIDUAL                                                                          Group Therapy Note    Date: 10/28/2021    Group Start Time: 1030  Group End Time: 1100  Group Topic: Nursing    [facility] 2 [facility] ACUTE    BIANCA Kim  GROUP DOCUMENTATION GROUP    Group Therapy Note    Attendees:          Attendance: Attended    Patient's Goal:      Interventions/techniques: Supported    Follows Directions: Followed directions    Interactions: Interacted appropriately    Mental Status: Happy    Behavior/appearance:     Goals Achieved:      Additional Notes:      Nicole Moyer LPN
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
## 2021-10-28 NOTE — BH NOTES
Patient has been attending groups. Speech is garbled at times, but if patient is asked to slow down when he talks, his speech is much clearer. Continue to monitor.

## 2021-10-28 NOTE — PROGRESS NOTES
Problem: Falls - Risk of  Goal: *Absence of Falls  Description: Document [place] Fall Risk and appropriate interventions in the flowsheet.   Outcome: Progressing Towards Goal  Note: Fall Risk Interventions:                                Problem: Paranoid Ideation  Goal: *LTG: Show more trust in others by speaking positively of them & reporting comfort in socializing  Outcome: Progressing Towards Goal     Problem: Suicide  Goal: *STG: Remains safe in hospital  Outcome: Progressing Towards Goal  Goal: *STG: Attends activities and groups  Outcome: Progressing Towards Goal
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
## 2021-10-28 NOTE — GROUP NOTE
KEANU  GROUP DOCUMENTATION INDIVIDUAL                                                                          Group Therapy Note    Date: 10/28/2021    Group Start Time: 1030  Group End Time: 1100  Group Topic: [employer]    [address] [facility] GROUP DOCUMENTATION GROUP    Group Therapy Note    Attendees:   Seven out of twelve patients attended         Attendance: Attended    Patient's Goal:  Wants to go home    Interventions/techniques: Supported    Follows Directions: Followed directions    Interactions: Interacted appropriately    Mental Status: Calm    Behavior/appearance: Cooperative    Goals Achieved: Able to engage in interactions      Additional Notes: States medication is working.     Jaquan Álvarez

## 2021-10-28 NOTE — GROUP NOTE
KEANU  GROUP DOCUMENTATION INDIVIDUAL                                                                          Group Therapy Note    Date: 10/28/2021    Group Start Time: 1100  Group End Time: 1130  Group Topic: [employer]    SRM 2 BEHA HLTH ACUTE    Michaela Gregg LPN    IP 1150 [facility] GROUP DOCUMENTATION GROUP    Group Therapy Note    Attendees:       Attendance: {[facility] GROUP DOC ATTENDANCE:96465}    Patient's Goal:  ***    Interventions/techniques: Follows Directions:     Interactions:     Mental Status:     Behavior/appearance:     Goals Achieved:        Additional Notes:      Santos Dalal LPN

## 2021-10-28 NOTE — BH NOTES
Behavioral Health Treatment Team Note     Patient goal(s) for today: \"Try to get out of this place\"   Treatment team focus/goals: Attend group and continue medication management     Progress note: The pt described his mood as \"alright\" and presented a calm mood with a happy affect. He denied SI/HI/AVH at this time and was oriented x3. His main goal is to get out of the hospital and carry on with his normal daily routines. His speech and voice was pressured and the writer had a difficult understanding him at times. The pt needed some prompting at times to answer the question but overall was pleasant with the writer. Continued inpatient level of care needed for medication management and group therapy for safe discharge.           LOS:  6  Expected LOS: 7-10 Days     Insurance info/prescription coverage: BLUE CROSS [id number]  Date of last family contact:  None  Family requesting physician contact today:  no  Discharge plan:  Pt will work with  to determine options   Guns in the home:  no   Outpatient provider(s):  Unknown    Participating treatment team members: Mark Horton, * (assigned SW), NIA IRENE,  [employer]

## 2021-10-28 NOTE — GROUP NOTE
IP  GROUP DOCUMENTATION INDIVIDUAL                                                                          Group Therapy Note    Date: 10/28/2021    Group Start Time: 1125  Group End Time: 1200  Group Topic: Education Group - Inpatient    SRM 2  NON ACUTE    Chiararo Francisco    IP 1150 [facility] GROUP DOCUMENTATION GROUP    Group Therapy Note    Facilitated discussion focused on strength exploration and understanding how they are used and learning new ways to apply them    Attendees: 5/12         Attendance: Attended    Patient's Goal:  Attend group daily     Interventions/techniques: Informed and Supported    Follows Directions: Followed directions    Interactions: Interacted appropriately    Mental Status: Calm    Behavior/appearance: Needed prompting    Goals Achieved: Able to engage in interactions, Able to listen to others and Able to self-disclose      Additional Notes:  Receptive to information and engaged in discussion. Pt shared \"his job, love and [place]"  as his current strengths.  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

## 2021-10-28 NOTE — GROUP NOTE
[facility] GROUP DOCUMENTATION INDIVIDUAL                                                                          Group Therapy Note    Date: 10/28/2021    Group Start Time: 1415  Group End Time: 1500  Group Topic: AA/NA    SRM 2 BEHA HLTH ACUTE    Irasema Gonsales    [facility] GROUP DOCUMENTATION GROUP    Group Therapy Note    Attendees: 5/8    AA/NA: pts were encouraged to share what they have struggled with substance wise. Pts were then encouraged to share where they were 5 years ago and what they want to let go of. Pts were then asked what they want to achieve in a years time. Pts were encourage to provide feedback to one another and reflect on group          Attendance: Attended    Patient's Goal:  Attend activities and groups     Interventions/techniques: Validated, Promoted peer support, Provide feedback and Supported    Follows Directions: Followed directions    Interactions: Interacted appropriately    Mental Status: Calm, Congruent and Flat    Behavior/appearance: Attentive, Neatly groomed, Needed prompting and Withdrawn/quiet    Goals Achieved: Able to engage in interactions, Able to listen to others, Able to reflect/comment on own behavior and Able to self-disclose      Additional Notes:  Pt spoke about how he has 'lost everything' and that he used to be someone 'kids would look up to'. Pt said he wants to get back to that space again. Writer encouraged pt to do so and said that it is possible.  Pt is making progress towards goals by attending and participating in group    [facility]

## 2021-10-28 NOTE — GROUP NOTE
IP  GROUP DOCUMENTATION INDIVIDUAL                                                                          Group Therapy Note    Date: 10/28/2021    Group Start Time: 3238  Group End Time: 1600  Group Topic: Reflection/Relaxation    SR[facility] CARE MANAGEMENT    Chaz Christine    IP 1150 [facility] GROUP DOCUMENTATION GROUP    Group Therapy Note  Pts were encouraged to engage in deep breathing and guided meditation. Pts were encouraged to reflect on their experiences and how they felt after doing a guided mediation. Attendees: 2/12         Attendance: Attended    Patient's Goal:  Pt responded to check in question and stated that he is good. Interventions/techniques: Reinforced and Supported    Follows Directions: Followed directions    Interactions: Interacted appropriately    Mental Status: Calm and Congruent    Behavior/appearance: Attentive, Cooperative and Motivated    Goals Achieved: Able to manage/cope with feelings and Able to self-disclose      Additional Notes:  Pt presented with a calm affect and congruent mood. Pt was motivated and engaged in the guided meditation. Pt was polite and respectful.      Viraj Carrillo

## 2021-10-28 NOTE — GROUP NOTE
KEANU  GROUP DOCUMENTATION INDIVIDUAL                                                                          Group Therapy Note    Date: 10/28/2021    Group Start Time: 0930  Group End Time: 1000  Group Topic: [employer]    SRM 2 BEHA HLTH ACUTE    Gearlean Chol    IP 1150 [facility] GROUP DOCUMENTATION GROUP    Group Therapy Note    Attendees:  Seven out of twelve attended         Attendance: [facility] GROUP DOC ATTENDANCE:[id number]}    Patient's Goal:  ***    Interventions/techniques: {[facility] GROUP DOC INTERVENTIONS/TECHNIQUES:26480}    Follows Directions: {[facility] GROUP DOC FOLLOWS DIRECTION:78276}    Interactions: {[facility] GROUP DOC INTERACTIONS:80005}    Mental Status: {[facility] GROUP DOC MENTAL STATUS:53707}    Behavior/appearance: {[facility] GROUP DOC BEHAVIOR/APPEARANCE:60246}    Goals Achieved: {[facility] GROUP DOC GOALS ACHIEVED:02667}      Additional Notes:  ***    Tennille Marie

## 2021-10-29 PROCEDURE — 65220000003 HC RM SEMIPRIVATE PSYCH

## 2021-10-29 PROCEDURE — 74011250637 HC RX REV CODE- 250/637: Performed by: PSYCHIATRY & NEUROLOGY

## 2021-10-29 RX ADMIN — DIVALPROEX SODIUM 500 MG: 500 TABLET, DELAYED RELEASE ORAL at 08:49

## 2021-10-29 RX ADMIN — CITALOPRAM HYDROBROMIDE 10 MG: 10 TABLET ORAL at 08:49

## 2021-10-29 RX ADMIN — TOPIRAMATE 25 MG: 25 TABLET, FILM COATED ORAL at 08:49

## 2021-10-29 RX ADMIN — TOPIRAMATE 25 MG: 25 TABLET, FILM COATED ORAL at 20:48

## 2021-10-29 RX ADMIN — DIVALPROEX SODIUM 500 MG: 500 TABLET, DELAYED RELEASE ORAL at 20:48

## 2021-10-29 NOTE — BH NOTES
Dx: MDD    Pleasant. Accepted scheduled meds. Consumed 100% of bfkt. Some interactions with others. Appropriate. Encouraged to attend groups. Q 15 mins checks maintained, for safety.

## 2021-10-29 NOTE — GROUP NOTE
KEANU  GROUP DOCUMENTATION INDIVIDUAL                                                                          Group Therapy Note    Date: 10/29/2021    Group Start Time: 8133  Group End Time: 1400  Group Topic: Process Group - Inpatient    [address] [facility] [address] [place] [address] GROUP DOCUMENTATION GROUP    Group Therapy Note  Pts were encouraged to discuss current stressors and feelings associated with these stressors. Pts were encouraged to discuss ambitions and goals. Pts were encouraged to provide peer support and self-disclose. Attendees: 4/10         Attendance: Attended    Patient's Goal:  Pt responded to check in question and stated that he is feeling \"good. \"     Interventions/techniques: Informed, Validated, Promoted peer support and Provide feedback    Follows Directions: Followed directions    Interactions: Interacted appropriately    Mental Status: Calm, Congruent and Flat    Behavior/appearance: Attentive, Cooperative and Motivated    Goals Achieved: Able to engage in interactions, Able to listen to others and Able to self-disclose      Additional Notes:  Pt presented with a calm affect and congruent mood. Pt was able to self-disclose and provide peer support. Pt reflected on his future goals. Pt was polite and respectful.      Serene Baez

## 2021-10-29 NOTE — GROUP NOTE
IP  GROUP DOCUMENTATION INDIVIDUAL                                                                          Group Therapy Note    Date: 10/29/2021    Group Start Time: 1130  Group End Time: 1200  Group Topic: Education Group - Inpatient    SRM 2  NON ACUTE    Radha Pierson    IP 1150 [facility] GROUP DOCUMENTATION GROUP    Group Therapy Note    Facilitated discussion focus on understanding and developing healthy boundaries to improve relationships    Attendees: 5/12         Attendance: Attended    Patient's Goal:  Attend group daily     Interventions/techniques: Informed and Supported    Follows Directions:  Followed directions    Interactions: Interacted appropriately    Mental Status: Calm    Behavior/appearance: Cooperative    Goals Achieved: Able to engage in interactions, Able to listen to others and Able to self-disclose      Additional Notes:  Receptive to information discussed on healthy and unhealthy boundaries and was able to share some of them that applied to him such as \"know what he will or will not do and know what he will allow others to do and not do\"     Twin Reed

## 2021-10-29 NOTE — PROGRESS NOTES
Problem: Paranoid Ideation  Goal: *LTG: Show more trust in others by speaking positively of them & reporting comfort in socializing  Outcome: Progressing Towards Goal     Problem: Falls - Risk of  Goal: *Absence of Falls  Description: Document Cass Led Fall Risk and appropriate interventions in the flowsheet.   Outcome: Progressing Towards Goal  Note: Fall Risk Interventions:                                Problem: Paranoid Ideation  Goal: *LTG: Show more trust in others by speaking positively of them & reporting comfort in socializing  Outcome: Progressing Towards Goal

## 2021-10-29 NOTE — GROUP NOTE
IP  GROUP DOCUMENTATION INDIVIDUAL                                                                          Group Therapy Note    Date: 10/29/2021    Group Start Time: 3211  Group End Time: 1620  Group Topic: Recreational/Music Therapy    SRM 2  NON ACUTE    Radha Pierson    IP 1150 [facility] GROUP DOCUMENTATION GROUP    Group Therapy Note    Facilitated leisure skills group to reinforce positive coping through music, social interaction, group activities and arts/crafts    Attendees: 5/10         Attendance: Attended    Patient's Goal:  Attend group daily     Interventions/techniques: Art integration and Supported    Follows Directions: Followed directions    Interactions: Interacted appropriately    Mental Status: Calm    Behavior/appearance: Cooperative    Goals Achieved: Able to engage in interactions and Able to listen to others      Additional Notes:  Receptive to listening to music and songs he selected while interacting with peers and staff.  Declined to work on leisure task    Marci Warner, [address]

## 2021-10-29 NOTE — BH NOTES
Patient has been up and about, on the unit. Socializing with peers in the day room. He's pleasant & cooperative and in good spirit. He's alert & oriented. He denies depression, anxiety, SI, AH  VH. He's medication compliant. He on close observation for safety.

## 2021-10-29 NOTE — BH NOTES
Behavioral Health Treatment Team Note     Patient goal(s) for today: \"Play in the rain and live life to the fullest\"  Treatment team focus/goals: Attend group and continue medication management     Progress note: The pt described his mood as \"fine\" and presented a happy mood with a happy affect. He denied SI/HI/AVH at this time and was oriented x4. He asked the writer if he could go out in the rain and make some mud castles and families but the writer regretfully declined. His voice was pressured but he was able to communicate with the writer. The pt maintained good eye contact with the writer and was pleasant. An inpatient level of care is needed at this time for a safe discharge plan and therapeutic intervention.       LOS:  7  Expected LOS: 8-11 Days     Insurance info/prescription coverage: BLUE CROSS [id number]  Date of last family contact:  None  Family requesting physician contact today:  no  Discharge plan:  Pt will work with  to determine options   Guns in the home:  no   Outpatient provider(s):  Unknown    Participating treatment team members: Valarie Lewis, * (assigned SW), [facility] MS,  [facility]

## 2021-10-30 PROCEDURE — 74011250637 HC RX REV CODE- 250/637: Performed by: PSYCHIATRY & NEUROLOGY

## 2021-10-30 PROCEDURE — 65220000003 HC RM SEMIPRIVATE PSYCH

## 2021-10-30 RX ADMIN — TOPIRAMATE 25 MG: 25 TABLET, FILM COATED ORAL at 08:36

## 2021-10-30 RX ADMIN — CITALOPRAM HYDROBROMIDE 10 MG: 10 TABLET ORAL at 08:36

## 2021-10-30 RX ADMIN — DIVALPROEX SODIUM 500 MG: 500 TABLET, DELAYED RELEASE ORAL at 20:23

## 2021-10-30 RX ADMIN — TOPIRAMATE 25 MG: 25 TABLET, FILM COATED ORAL at 20:23

## 2021-10-30 RX ADMIN — DIVALPROEX SODIUM 500 MG: 500 TABLET, DELAYED RELEASE ORAL at 08:36

## 2021-10-30 NOTE — BH NOTES
Pt.is up at intervals, affect is brighter,, appetite good, appearance is in green hospital gown, denies feeling suicidal or depressed,pt.is limited with insight and judgement, pleasant upon approach,accepts meds. Minimal interaction with peers, no other concerns voiced, remains on close observation.

## 2021-10-30 NOTE — BH NOTES
Patient has been up in the day room, watching television and socializing with peers. He denies depression, anxiety, SI, HI, AH & VH. He cooperative, pleasant, alert & oriented X 4. He medication compliant. No acute distress noted. He on close observation for safety.

## 2021-10-30 NOTE — GROUP NOTE
[facility] GROUP DOCUMENTATION INDIVIDUAL                                                                          Group Therapy Note    Date: 10/30/2021    Group Start Time: 1115  Group End Time: 1200  Group Topic: Education Group - Inpatient    [facility] 2  NON ACUTE    [facility] 1150 [facility] GROUP DOCUMENTATION GROUP    Group Therapy Note    Attendees: 4/9  Facilitated group discussion on The Trauma Response with discussion related to the brain, triggers, current stressors and positive ways to cope. Attendance: Attended    Patient's Goal: STG: Attends activities and groups      Interventions/techniques: Informed, Provide feedback and Supported    Follows Directions: Followed directions    Interactions: Interacted appropriately    Mental Status: Calm and Flat    Behavior/appearance: Attentive and Cooperative    Goals Achieved: Able to engage in interactions and Able to listen to others    Additional Notes: Attended group and actively engaged in group discussion. Was receptive to intervention and participated in discussion with cues and encouragement. PT was able to identify symptoms of anxiety/stress, and ways to cope. PT identified that he took an overdose prior to admission but denies feeling suicidal at this time . Pt also able to identify positive ways to cope to include doing family activities such as cook outs.      Allyson Boykin, CTRS

## 2021-10-30 NOTE — BH NOTES
Progress note for October 28, 2021 patient case discussed in the treatment team patient seen patient is little bit drowsy perplexed verbal I want to go home some perplexity confusion noted insight minimal judgment fair patient want to go home unable to reach his sister patient is also still on Ativan switch the Ativan from regular scheduled dose to as needed continued inpatient level of care indicated insight is limited vital signs stable no new labs resulted

## 2021-10-30 NOTE — BH NOTES
Patient case discussed in the treatment team patient seen for follow-up he is alert awake neat clean calm polite bright affect and engaging better eye contact. Still sometimes loud irritable need further.   Of stabilization continued inpatient level of care indicated anticipated discharge on coming Monday looking for an outpatient treatment continued inpatient level of care indicated thank you

## 2021-10-30 NOTE — GROUP NOTE
IP  GROUP DOCUMENTATION INDIVIDUAL                                                                          Group Therapy Note    Date: 10/30/2021    Group Start Time: 1324  Group End Time: 1600  Group Topic: Recreational/Music Therapy    SRM 2 BH NON ACUTE    Dorthey Meth    IP 1150 [facility] GROUP DOCUMENTATION GROUP    Group Therapy Note    Attendees: 4/9     Facilitated structured group to introduce relaxation technique using Mandalas and instrumentals  to practice relaxation and mindfulness in group    Attendance: Attended    Patient's Goal: *STG:Report interacting socially without fear or suspicion       Interventions/techniques: Art integration and Supported    Follows Directions: Followed directions    Interactions: Interacted appropriately    Mental Status: Calm and Flat    Behavior/appearance: Attentive and Cooperative    Goals Achieved: Able to engage in interactions and Able to listen to others      Additional Notes: Attended group and actively participated in group. Was receptive to intervention. Verbalized group was positive way to relax. Verbalized hopes to discharge on Monday.     FRANK BagleyS

## 2021-10-30 NOTE — PROGRESS NOTES
Problem: Falls - Risk of  Goal: *Absence of Falls  Description: Document Perry Lamas Fall Risk and appropriate interventions in the flowsheet.   Outcome: Progressing Towards Goal  Note: Fall Risk Interventions:                                Problem: Paranoid Ideation  Goal: *LTG: Show more trust in others by speaking positively of them & reporting comfort in socializing  Outcome: Progressing Towards Goal     Problem: Suicide  Goal: *STG: Remains safe in hospital  Outcome: Progressing Towards Goal

## 2021-10-31 PROCEDURE — 65220000003 HC RM SEMIPRIVATE PSYCH

## 2021-10-31 PROCEDURE — 74011250637 HC RX REV CODE- 250/637: Performed by: PSYCHIATRY & NEUROLOGY

## 2021-10-31 RX ADMIN — TOPIRAMATE 25 MG: 25 TABLET, FILM COATED ORAL at 20:13

## 2021-10-31 RX ADMIN — HYDROXYZINE HYDROCHLORIDE 50 MG: 50 TABLET, FILM COATED ORAL at 20:13

## 2021-10-31 RX ADMIN — DIVALPROEX SODIUM 500 MG: 500 TABLET, DELAYED RELEASE ORAL at 20:13

## 2021-10-31 RX ADMIN — CITALOPRAM HYDROBROMIDE 10 MG: 10 TABLET ORAL at 08:53

## 2021-10-31 RX ADMIN — DIVALPROEX SODIUM 500 MG: 500 TABLET, DELAYED RELEASE ORAL at 08:53

## 2021-10-31 RX ADMIN — TOPIRAMATE 25 MG: 25 TABLET, FILM COATED ORAL at 08:53

## 2021-10-31 NOTE — BH NOTES
Pt.is in dayroom with peer watching TV, minimal interaction with peers,denies feeling suicidal or depressed,accepts medications without difficulty,accepts meals,denies hallucinations,no concerns voiced, remains on close observation.

## 2021-10-31 NOTE — GROUP NOTE
IP  GROUP DOCUMENTATION INDIVIDUAL                                                                          Group Therapy Note    Date: 10/31/2021    Group Start Time: 1115  Group End Time: 1200  Group Topic: Education Group - Inpatient    SRM 2 BH NON ACUTE    Gricel George    IP 1150 [facility] GROUP DOCUMENTATION GROUP    Group Therapy Note    Attendees: 1/10  Facilitated structured group to introduce Mindfulness and Meditation as positive way to cope and manage daily stressors. Introduced relaxation technique using Guided Imagery to practice relaxation in group. Attendance: Attended    Patient's Goal: STG: Attends activities and groups      Interventions/techniques: Informed, Provide feedback and Supported    Follows Directions: Followed directions    Interactions: Interacted appropriately    Mental Status: Calm    Behavior/appearance: Attentive    Goals Achieved: Able to engage in interactions and Able to listen to others      Additional Notes: Attended group and participated with encouragement. Received materials provided. Listened to guided imagery technique and verbalized enjoyment. Was receptive to intervention. Was able to focus on skill entire session.       FRANK JohnS

## 2021-10-31 NOTE — PROGRESS NOTES
Problem: Paranoid Ideation  Goal: *LTG: Show more trust in others by speaking positively of them & reporting comfort in socializing  Outcome: Progressing Towards Goal  Goal: *LTG: Verbalize trust of significant other & eliminate accusations of disloyalty  Outcome: Progressing Towards Goal  Goal: *LTG: Report reduced vigilance & suspicion around others as well as more relaxed, trusting, & open interaction  Outcome: Progressing Towards Goal  Goal: *STG: Identify core belief that others are untrustworthy & malicious  Outcome: Progressing Towards Goal

## 2021-10-31 NOTE — GROUP NOTE
IP  GROUP DOCUMENTATION INDIVIDUAL                                                                          Group Therapy Note    Date: 10/31/2021    Group Start Time: 1320  Group End Time: 5299  Group Topic: Recreational/Music Therapy    SRM 2  NON ACUTE    Maria Guadalupe Haley    [facility] GROUP DOCUMENTATION GROUP    Group Therapy Note    Attendees:5/10  Facilitated structured leisure skills group to introduce healthy leisure skills as positive way to cope and manage mood. Attendance: Attended    Patient's Goal:  STG:Report interacting socially without fear or suspicion         Interventions/techniques: Art integration and Supported    Follows Directions: Followed directions    Interactions: Interacted appropriately    Mental Status: Calm    Behavior/appearance: Attentive    Goals Achieved: Able to engage in interactions and Able to listen to others    Additional Notes: Attended group and actively participated. Received leisure packet. Worked on task in group and selected songs to listen to with peers. Was receptive to intervention and used time constructively.      Terence Page, CTRS

## 2021-11-01 VITALS
RESPIRATION RATE: 18 BRPM | DIASTOLIC BLOOD PRESSURE: 76 MMHG | TEMPERATURE: 97.2 F | OXYGEN SATURATION: 98 % | HEART RATE: 76 BPM | WEIGHT: 152 LBS | HEIGHT: 69 IN | BODY MASS INDEX: 22.51 KG/M2 | SYSTOLIC BLOOD PRESSURE: 120 MMHG

## 2021-11-01 PROCEDURE — 74011250637 HC RX REV CODE- 250/637: Performed by: PSYCHIATRY & NEUROLOGY

## 2021-11-01 RX ORDER — METOPROLOL TARTRATE 50 MG/1
50 TABLET ORAL 2 TIMES DAILY
Qty: 30 TABLET | Refills: 0 | Status: SHIPPED | OUTPATIENT
Start: 2021-11-01 | End: 2022-03-21

## 2021-11-01 RX ORDER — HYDROXYZINE 50 MG/1
50 TABLET, FILM COATED ORAL
Qty: 30 TABLET | Refills: 0 | Status: SHIPPED | OUTPATIENT
Start: 2021-11-01 | End: 2021-11-11

## 2021-11-01 RX ORDER — DIVALPROEX SODIUM 500 MG/1
500 TABLET, DELAYED RELEASE ORAL 2 TIMES DAILY
Qty: 6 TABLET | Refills: 0 | Status: SHIPPED | OUTPATIENT
Start: 2021-11-01 | End: 2022-03-21

## 2021-11-01 RX ORDER — ATORVASTATIN CALCIUM 10 MG/1
10 TABLET, FILM COATED ORAL
Qty: 30 TABLET | Refills: 0 | Status: ON HOLD | OUTPATIENT
Start: 2021-11-01 | End: 2022-03-21 | Stop reason: SDUPTHER

## 2021-11-01 RX ORDER — CITALOPRAM 10 MG/1
10 TABLET ORAL DAILY
Qty: 30 TABLET | Refills: 0 | Status: SHIPPED | OUTPATIENT
Start: 2021-11-02 | End: 2022-03-21

## 2021-11-01 RX ORDER — NAPROXEN 500 MG/1
500 TABLET ORAL 2 TIMES DAILY WITH MEALS
Qty: 30 TABLET | Refills: 0 | Status: ON HOLD | OUTPATIENT
Start: 2021-11-01 | End: 2022-03-21 | Stop reason: SDUPTHER

## 2021-11-01 RX ADMIN — DIVALPROEX SODIUM 500 MG: 500 TABLET, DELAYED RELEASE ORAL at 08:52

## 2021-11-01 RX ADMIN — CITALOPRAM HYDROBROMIDE 10 MG: 10 TABLET ORAL at 08:52

## 2021-11-01 RX ADMIN — TOPIRAMATE 25 MG: 25 TABLET, FILM COATED ORAL at 08:52

## 2021-11-01 NOTE — GROUP NOTE
IP  GROUP DOCUMENTATION INDIVIDUAL                                                                          Group Therapy Note    Date: 11/1/2021    Group Start Time: 0836  Group End Time: 1600  Group Topic: Recreational/Music Therapy    SRM 2  NON ACUTE    Krissy Chris    IP 1150 [facility] GROUP DOCUMENTATION GROUP    Group Therapy Note    Facilitated leisure skills group to reinforce positive coping through music, social interaction, group activities and arts/crafts    Attendees: 6/13         Attendance: Attended    Patient's Goal:  Attend group daily     Interventions/techniques: Art integration and Supported    Follows Directions: Followed directions    Interactions: Interacted appropriately    Mental Status: Calm    Behavior/appearance: Cooperative    Goals Achieved: Able to engage in interactions and Able to listen to others      Additional Notes:  Receptive to listening to music and songs he selected while interacting with peers and staff.  Declined to work on leisure task    Nimo Ramirez, [address]

## 2021-11-01 NOTE — BH NOTES
Writer spoke with pt about discharge. Pt reported that he does feel safe to go home but enjoys the groups a lot.  Writer explained they can make a referral to [facility] and pt was in agreeance with this

## 2021-11-01 NOTE — SUICIDE SAFETY PLAN
SAFETY PLAN    A suicide Safety Plan is a document that supports someone when they are having thoughts of suicide. Warning Signs that indicate a suicidal crisis may be developing: What (situations, thoughts, feelings, body sensations, behaviors, etc.) do you experience that lets you know you are beginning to think about suicide? 1. Drink more  2. isolate  3. irritable    Internal Coping Strategies:  What things can I do (relaxation techniques, hobbies, physical activities, etc.) to take my mind off my problems without contacting another person? 1. Go to groups   2. Breathing exercise  3. Watch tv    People and social settings that provide distraction: Who can I call or where can I go to distract me? 1. Name: Elma Rodriguez  Phone: [phone]  2. Name: Nohelia Paz  Phone: per pt, number is in phone    3. Place: [address] [place] [address] Road: [address] [facility] whom I can ask for help: Who can I call when I need help - for example, friends, family, clergy, someone else? 1. Name: lEma Rodriguez                 Phone: same as above   2. Name: Mckenzie Boles  Phone: per pt, number is in phone   3. Name:   Phone: per pt, number is in phone     Professionals or [address] I can contact during a crisis: Who can I call for help - for example, my doctor, my psychiatrist, my psychologist, a mental health provider, a suicide hotline? 1. Clinician Name: [facility]   Phone: [phone]      Clinician Pager or Emergency Contact #: [phone]    2. Clinician Name: Pablo Castro   Phone: [phone]      Clinician Pager or Emergency Contact #: [phone]    7. Suicide Prevention Lifeline: 8-186-127-TALK (2847)    4.  [address] [place] [address] Emergency Services -  for example, [facility] suicide hotline, [facility] Hotline: [facility]      Emergency Services Address: [address] Segura [address] [id number]      Emergency Services Phone: [phone]    Making the environment safe: How can I make my environment (house/apartment/living space) safer? For example, can I remove guns, medications, and other items? 1. Take meds as prescribed  2.  Remove substances

## 2021-11-01 NOTE — BH NOTES
Patient in good mood, smiling conversing with peers and staff. Upon assessment, patient verbalized fears experienced during the night, easily startled, and recently concerned about discharge, feeling \"I'm not ready\" and stated \"I love the meetings, I get a lot out of them\". Med compliant and denies SI/HI, went into the fear at night when asked about any hallucinations.   Patient got up early apx 0430 and remained up and in the belle, then day room

## 2021-11-01 NOTE — BH NOTES
DISCHARGE SUMMARY    NAME:Félix Day  : 1960  MRN: [id number]    The patient Chip Ropes exhibits the ability to control behavior in a less restrictive environment. Patient's level of functioning is improving. No assaultive/destructive behavior has been observed for the past 24 hours. No suicidal/homicidal threat or behavior has been observed for the past 24 hours. There is no evidence of serious medication side effects. Patient has not been in physical or protective restraints for at least the past 24 hours. If weapons involved, how are they secured? n/a    Is patient aware of and in agreement with discharge plan? yes    Arrangements for medication:  Prescriptions given to patient, given a weeks supply or 30 day supply. Copy of discharge instructions to provider?:  yes    Arrangements for transportation home:  Pt will take medicaid cab home     Keep all follow up appointments as scheduled, continue to take prescribed medications per physician instructions.   Mental health crisis number:  194 or your local mental health crisis line number at [place] [address] Emergency WARM LINE      [phone] ([phone])      M-F: 9am to 9pm      Sat & Sun: 5pm - 9pm  National suicide prevention lines:                             3-164-VCQHZBH ([phone])       7-005-519-TALK ([phone])    Crisis Text Line:  Text HOME to 755181

## 2021-11-01 NOTE — GROUP NOTE
[facility] GROUP DOCUMENTATION INDIVIDUAL                                                                          Group Therapy Note    Date: 11/1/2021    Group Start Time: 1320  Group End Time: 1401  Group Topic: Process Group - Inpatient    [facility] CARE MANAGEMENT    Maryana Slaughter BSW    [facility] GROUP DOCUMENTATION GROUP    Group Therapy Note      The facilitator encouraged the patients to process their time at the hospital through open discussion as well as promote feedback and support for one another. Attendees: 4/13          Attendance: Attended    Patient's Goal:  Attend group     Interventions/techniques: Promoted peer support and Provide feedback    Follows Directions: Followed directions    Interactions: Disorganized interaction    Mental Status: Anxious, Elevated, Preoccupied and Worried    Behavior/appearance: Attentive, Disheveled and Pressured speech    Goals Achieved: Able to engage in interactions, Able to listen to others, Able to receive feedback and Able to self-disclose      Additional Notes: The pt is expected to discharge today but is apprehensive and even asked to appeal his discharge. The pt couldn't answer why he was feeling this way but said he will go looking for trouble once he leaves here and that he has weapons at home. The writer belives he doesn't want to leave the hospital because he has become comfortable here and has become accustomed to the routine.        DOMINIQUE Duncan

## 2021-11-01 NOTE — PROGRESS NOTES
Discussed case interviewed patient  Taking and tolerating medications well  Interacts with peers and staff  Reports feeling less depressed  Denies acute psychotic symptoms  States mood is improving  Taking medications regularly  Currently on Topamax Depakote and Celexa  No overt aggression    Mental status exam  Alert oriented cooperative  Speech is goal-directed spontaneous  Mood is okay affect is appropriate  Thought process linear and logical  Insight and judgment fair    Recommendations  Continue inpatient treatments  Follow-up with psychiatric team again tomorrow

## 2021-11-01 NOTE — BH NOTES
Patient discharged to home at 1700. Belongings sent with patient. Verbal/written discharge instructions explained & given to patient. Patient verbalized understanding. Calm & cooperative. Denies SI/HI. Denies AVH. No physical complaints voiced. Positive attitude toward discharge.

## 2021-11-01 NOTE — BH NOTES
Behavioral Health Transition Record to Provider    Patient Name: Margo Moody  YOB: 1960  Medical Record Number: [id number]  Date of Admission: 10/21/2021  Date of Discharge: 11.1.21    Attending Provider: Shakira Guevara MD  Discharging Provider: Dr Carly Jim  To contact this individual call [phone] and ask the  to page. If unavailable, ask to be transferred to [address] [place] [address] Provider on call. [facility] Provider will be available on call 24/7 and during holidays. Primary Care Provider: Teri Mansfield NP    No Known Allergies    Reason for Admission: Pt was admitted for suicidal ideation with a plan. Pt reported feeling depressed and overwhelmed    Admission Diagnosis: Major depression [F32.9]  Suicide attempt ([facility] [address] [facility]    * No surgery found *    Results for orders placed or performed during the hospital encounter of 10/21/21   COVID-19 RAPID TEST   Result Value Ref Range    Specimen source Nasopharyngeal      COVID-19 rapid test Not Detected Not Detected     CBC WITH AUTOMATED DIFF   Result Value Ref Range    WBC 4.4 4.1 - 11.1 K/uL    RBC 4.28 4.10 - 5.70 M/uL    HGB 12.0 (L) 12.1 - 17.0 g/dL    HCT 37.4 36.6 - 50.3 %    MCV 87.4 80.0 - 99.0 FL    MCH 28.0 26.0 - 34.0 PG    MCHC 32.1 30.0 - 36.5 g/dL    RDW 15.9 (H) 11.5 - 14.5 %    PLATELET 088 555 - 686 K/uL    MPV 9.2 8.9 - 12.9 FL    NRBC 0.0 0.0  WBC    ABSOLUTE NRBC 0.00 0.00 - 0.01 K/uL    NEUTROPHILS 42 32 - 75 %    LYMPHOCYTES 48 12 - 49 %    MONOCYTES 8 5 - 13 %    EOSINOPHILS 1 0 - 7 %    BASOPHILS 1 0 - 1 %    IMMATURE GRANULOCYTES 0 0 - 0.5 %    ABS. NEUTROPHILS 1.9 1.8 - 8.0 K/UL    ABS. LYMPHOCYTES 2.2 0.8 - 3.5 K/UL    ABS. MONOCYTES 0.4 0.0 - 1.0 K/UL    ABS. EOSINOPHILS 0.0 0.0 - 0.4 K/UL    ABS. BASOPHILS 0.0 0.0 - 0.1 K/UL    ABS. IMM.  GRANS. 0.0 0.00 - 0.04 K/UL    DF AUTOMATED     METABOLIC PANEL, COMPREHENSIVE   Result Value Ref Range    Sodium 146 (H) 136 - 145 mmol/L Potassium 5.3 (H) 3.5 - 5.1 mmol/L    Chloride 118 (H) 97 - 108 mmol/L    CO2 20 (L) 21 - 32 mmol/L    Anion gap 8 5 - 15 mmol/L    Glucose 81 65 - 100 mg/dL    BUN 7 6 - 20 mg/dL    Creatinine 0.75 0.70 - 1.30 mg/dL    BUN/Creatinine ratio 9 (L) 12 - 20      GFR est AA >60 >60 ml/min/1.73m2    GFR est non-AA >60 >60 ml/min/1.73m2    Calcium 7.0 (L) 8.5 - 10.1 mg/dL    Bilirubin, total 0.3 0.2 - 1.0 mg/dL    AST (SGOT) 20 15 - 37 U/L    ALT (SGPT) 22 12 - 78 U/L    Alk. phosphatase 46 45 - 117 U/L    Protein, total 5.9 (L) 6.4 - 8.2 g/dL    Albumin 2.7 (L) 3.5 - 5.0 g/dL    Globulin 3.2 2.0 - 4.0 g/dL    A-G Ratio 0.8 (L) 1.1 - 2.2     DRUG SCREEN, URINE   Result Value Ref Range    AMPHETAMINES Negative Negative      BARBITURATES Negative Negative      BENZODIAZEPINES Negative Negative      COCAINE Negative Negative      METHADONE Negative Negative      OPIATES Negative Negative      PCP(PHENCYCLIDINE) Negative Negative      THC (TH-CANNABINOL) Negative Negative      Drug screen comment        This test is a screen for drugs of abuse in a medical setting only (i.e., they are unconfirmed results and as such must not be used for non-medical purposes, e.g.,employment testing, legal testing). Due to its inherent nature, false positive (FP) and false negative (FN) results may be obtained. Therefore, if necessary for medical care, recommend confirmation of positive findings by GC/MS.    URINALYSIS W/MICROSCOPIC   Result Value Ref Range    Color Yellow/Straw      Appearance Clear Clear      Specific gravity <1.005 1.003 - 1.030    pH (UA) 5.0 5.0 - 8.0      Protein Negative Negative mg/dL    Glucose Negative Negative mg/dL    Ketone Negative Negative mg/dL    Bilirubin Negative Negative      Blood Small (A) Negative      Urobilinogen 0.1 0.1 - 1.0 EU/dL    Nitrites Negative Negative      Leukocyte Esterase Negative Negative      WBC 0-4 0 - 4 /hpf    RBC 0-5 0 - 5 /hpf    Bacteria Negative Negative /hpf    Mucus Trace /lpf ETHYL ALCOHOL   Result Value Ref Range    ALCOHOL(ETHYL),SERUM 172 (H) <10 mg/dL   ACETAMINOPHEN   Result Value Ref Range    Acetaminophen level <10 (L) 10 - 30 ug/mL    Reported dose date Dose Dependent      Reported dose: Dose Dependent Units   SALICYLATE   Result Value Ref Range    Salicylate level <7.0 (L) 2.8 - 20.0 mg/dL    Reported dose date Dose Dependent      Reported dose: Dose Dependent Units   VALPROIC ACID   Result Value Ref Range    Valproic acid <3 (L) 50 - 100 ug/mL   SARS-COV-2   Result Value Ref Range    SARS-CoV-2 Please find results under separate order     POTASSIUM   Result Value Ref Range    Potassium Hemolyzed, recollect requested 3.5 - 5.1 mmol/L   VALPROIC ACID   Result Value Ref Range    Valproic acid <3 (L) 50 - 100 ug/mL   POTASSIUM   Result Value Ref Range    Potassium 4.1 3.5 - 5.1 mmol/L   EKG, 12 LEAD, INITIAL   Result Value Ref Range    Ventricular Rate 73 BPM    Atrial Rate 73 BPM    P-R Interval 166 ms    QRS Duration 98 ms    Q-T Interval 422 ms    QTC Calculation (Bezet) 464 ms    Calculated P Axis 82 degrees    Calculated R Axis 46 degrees    Calculated T Axis 43 degrees    Diagnosis       Normal sinus rhythm  Early repolarization  Normal ECG  When compared with ECG of 11-MAY-2021 01:07,  Vent. rate has decreased BY  36 BPM  Confirmed by [facility] ([id number] on 10/22/2021 4:40:33 PM         Immunizations administered during this encounter: There is no immunization history on file for this patient. Screening for Metabolic Disorders for Patients on Antipsychotic Medications  (Data obtained from the EMR)    Estimated Body Mass Index  Estimated body mass index is 22.64 kg/m² as calculated from the following:    Height as of this encounter: 5' 8.7\" (1.745 m). Weight as of this encounter: 68.9 kg (152 lb).      Vital Signs/Blood Pressure  Visit Vitals  /76   Pulse 76   Temp 97.2 °F (36.2 °C)   Resp 18   Ht 5' 8.7\" (1.745 m)   Wt 68.9 kg (152 lb)   SpO2 98%   BMI 22.64 kg/m²       Blood Glucose/Hemoglobin A1c  Lab Results   Component Value Date/Time    Glucose 81 10/21/2021 10:30 PM       No results found for: HBA1C, TUB3WULN     Lipid Panel  No results found for: CHOL, CHOLX, CHLST, CHOLV, 226275, HDL, HDLP, LDL, LDLC, DLDLP, TGLX, TRIGL, TRIGP, CHHD, CHHDX     Discharge Diagnosis: major depression with suicide attempt    Discharge Plan: Pt is returning home with outpatient services cooridnated     Discharge Medication List and Instructions:   Current Discharge Medication List      START taking these medications    Details   citalopram (CELEXA) 10 mg tablet Take 1 Tablet by mouth daily. Qty: 30 Tablet, Refills: 0  Start date: 11/2/2021      divalproex DR (DEPAKOTE) 500 mg tablet Take 1 Tablet by mouth two (2) times a day. Qty: 6 Tablet, Refills: 0  Start date: 11/1/2021      hydrOXYzine HCL (ATARAX) 50 mg tablet Take 1 Tablet by mouth daily as needed for Anxiety for up to 10 days. Qty: 30 Tablet, Refills: 0  Start date: 11/1/2021, End date: 11/11/2021         CONTINUE these medications which have CHANGED    Details   atorvastatin (LIPITOR) 10 mg tablet Take 1 Tablet by mouth nightly. Qty: 30 Tablet, Refills: 0  Start date: 11/1/2021      metoprolol tartrate (LOPRESSOR) 50 mg tablet Take 1 Tablet by mouth two (2) times a day. Qty: 30 Tablet, Refills: 0  Start date: 11/1/2021      naproxen (NAPROSYN) 500 mg tablet Take 1 Tablet by mouth two (2) times daily (with meals).   Qty: 30 Tablet, Refills: 0  Start date: 11/1/2021         STOP taking these medications       ibuprofen (MOTRIN) 400 mg tablet Comments:   Reason for Stopping:         gabapentin (NEURONTIN) 300 mg capsule Comments:   Reason for Stopping:         HYDROcodone-acetaminophen (NORCO) 5-325 mg per tablet Comments:   Reason for Stopping:               Unresulted Labs (24h ago, onward)    None        To obtain results of studies pending at discharge, please contact [phone]    Follow-up Information Follow up With Specialties Details Why [address]  Call referral was made for partial hospitalization program, please follow up for an intake date [phone]  [facility]  Thea Abdi [address]  Call referral made for medication management  [phone] ext [phone]   Mesfin Segura Amanda, [address] [place] [address],  Nurse Practitioner   James Silver [address] [place] [id number]            Advanced Directive:   Does the patient have an appointed surrogate decision maker? No  Does the patient have a Medical Advance Directive? No  Does the patient have a Psychiatric Advance Directive? No  If the patient does not have a surrogate or Medical Advance Directive AND Psychiatric Advance Directive, the patient was offered information on these advance directives Patient will complete at a later time    Patient Instructions: Please continue all medications until otherwise directed by physician. Tobacco Cessation Discharge Plan:   Is the patient a smoker and needs referral for smoking cessation? Not applicable  Patient referred to the following for smoking cessation with an appointment? Not applicable     Patient was offered medication to assist with smoking cessation at discharge? Not applicable  Was education for smoking cessation added to the discharge instructions? Not applicable    Alcohol/Substance Abuse Discharge Plan:   Does the patient have a history of substance/alcohol abuse and requires a referral for treatment? Yes  Patient referred to the following for substance/alcohol abuse treatment with an appointment? Yes  Patient was offered medication to assist with alcohol cessation at discharge? Not applicable  Was education for substance/alcohol abuse added to discharge instructions? Yes    Patient discharged to Home; provided to the patient/caregiver either in hard copy or electronically.

## 2021-11-16 NOTE — DISCHARGE SUMMARY
Kenny Dasilva [address] SUMMARY    Name:  Lexie Edmonds  MR#:  [id number]  :  1960  ACCOUNT #:  [de-identified]  ADMIT DATE:  10/21/2021  DISCHARGE DATE:  2021    Please make reference to my initial H and P. This is a 52-year-old [facility] male patient,  but , admitted to behavioral health unit voluntarily from [facility] ED, overdosed with Depakote and other pills to kill himself. HISTORY OF PRESENT ILLNESS:  The patient with a history of depression, mood disorder, history of seizures, wanted to kill himself. He says he is not happy with his life. He lives with his sister. Apparently, he also had a head injury and surgery on the head. He has seizures, takes Keppra and Depakote. He also uses alcohol, four to six 40 ounces of beer a day, and some cocaine. Prior to hospitalization, he says he saw me before, but has not been seeing anybody lately. PAST HISTORY:  Prior hospitalization. SUBSTANCE ABUSE:  Alcohol, cocaine. TRAUMA HISTORY:  No psychological trauma, but head trauma. ALLERGIES TO MEDICATIONS:  NO KNOWN ALLERGIES TO MEDICATION. MENTAL STATUS:  Alert, verbal, polite, cooperative, depressed, suicidal thoughts. He does chew tobacco but does not want a patch. DISPOSITION:  The patient was put on close observation, Medical consult, and individual therapy, group therapy, learning coping skills, stress management, medication management. He was stabilized and discharged. Done around on the safety planning. He is not suicidal or homicidal, not psychotic, fully detoxed, understanding of substance abuse, and has no weapons. LABORATORY DATA:  Not detected. CBC unremarkable. Hemoglobin 12. Blood chemistry:  Sodium 146, potassium 5.3, chloride 116, glucose 81. Liver function unremarkable. Urine drug screen was negative. Urinalysis unremarkable, small blood. Alcohol level 172. Acetaminophen 10, salicylate 1.7.   Valproic acid 3.  Repeat potassium 4.1. EKG unremarkable. Height 5 feet 8 inches and weight 68.9 kg. Blood pressure 120/76, pulse 76, temperature 97.2, respirations 18, SpO2 of 98% on room air. Glucose 81. DISCHARGE MEDICATIONS:  Naproxen 500 mg twice a day p.r.n., Lopressor 50 mg twice a day, atorvastatin 10 mg daily, Depakote 500 mg b.i.d., citalopram 10 mg daily. DISCHARGE DIAGNOSES:  Major depression, recurrent, acute, severe, with suicidal attempt with overdose with pills, history of head injury, alcohol intoxication, alcohol abuse, alcohol dependence, history of cocaine abuse. Discharge as improved. Not suicidal, not homicidal, not psychotic.       Stephanie Draper MD      [id number]  D:  11/14/2021 23:34  T:  11/15/2021 22:46  JOB #:  [id number]

## 2021-12-16 ENCOUNTER — HOSPITAL ENCOUNTER (EMERGENCY)
Age: 61
Discharge: HOME | End: 2021-12-16
Admitting: EMERGENCY MEDICINE
Payer: MEDICARE

## 2021-12-16 VITALS
HEIGHT: 68 IN | OXYGEN SATURATION: 95 % | TEMPERATURE: 97.6 F | HEART RATE: 87 BPM | SYSTOLIC BLOOD PRESSURE: 132 MMHG | WEIGHT: 152 LBS | RESPIRATION RATE: 18 BRPM | BODY MASS INDEX: 23.04 KG/M2 | DIASTOLIC BLOOD PRESSURE: 80 MMHG

## 2021-12-16 DIAGNOSIS — F19.10 DRUG ABUSE (HCC): ICD-10-CM

## 2021-12-16 DIAGNOSIS — Z00.8 MEDICAL CLEARANCE FOR PSYCHIATRIC ADMISSION: ICD-10-CM

## 2021-12-16 DIAGNOSIS — F10.10 ALCOHOL ABUSE: Primary | ICD-10-CM

## 2021-12-16 LAB
ALBUMIN SERPL-MCNC: 3.9 G/DL (ref 3.5–5)
ALBUMIN/GLOB SERPL: 0.9 {RATIO} (ref 1.1–2.2)
ALP SERPL-CCNC: 68 U/L (ref 45–117)
ALT SERPL-CCNC: 23 U/L (ref 12–78)
AMPHET UR QL SCN: NEGATIVE
ANION GAP SERPL CALC-SCNC: 8 MMOL/L (ref 5–15)
APAP SERPL-MCNC: <10 UG/ML (ref 10–30)
APPEARANCE UR: CLEAR
AST SERPL W P-5'-P-CCNC: 24 U/L (ref 15–37)
ATRIAL RATE: 87 BPM
BACTERIA URNS QL MICRO: NEGATIVE /HPF
BARBITURATES UR QL SCN: NEGATIVE
BASOPHILS # BLD: 0 K/UL (ref 0–0.1)
BASOPHILS NFR BLD: 1 % (ref 0–1)
BENZODIAZ UR QL: NEGATIVE
BILIRUB SERPL-MCNC: 0.5 MG/DL (ref 0.2–1)
BILIRUB UR QL: NEGATIVE
BUN SERPL-MCNC: 11 MG/DL (ref 6–20)
BUN/CREAT SERPL: 10 (ref 12–20)
CA-I BLD-MCNC: 8.6 MG/DL (ref 8.5–10.1)
CALCULATED P AXIS, ECG09: 67 DEGREES
CALCULATED R AXIS, ECG10: 26 DEGREES
CALCULATED T AXIS, ECG11: 29 DEGREES
CANNABINOIDS UR QL SCN: NEGATIVE
CHLORIDE SERPL-SCNC: 110 MMOL/L (ref 97–108)
CO2 SERPL-SCNC: 21 MMOL/L (ref 21–32)
COCAINE UR QL SCN: POSITIVE
COLOR UR: ABNORMAL
CREAT SERPL-MCNC: 1.11 MG/DL (ref 0.7–1.3)
DIAGNOSIS, 93000: NORMAL
DIFFERENTIAL METHOD BLD: ABNORMAL
DRUG SCRN COMMENT,DRGCM: ABNORMAL
EOSINOPHIL # BLD: 0 K/UL (ref 0–0.4)
EOSINOPHIL NFR BLD: 0 % (ref 0–7)
ERYTHROCYTE [DISTWIDTH] IN BLOOD BY AUTOMATED COUNT: 14.9 % (ref 11.5–14.5)
ETHANOL SERPL-MCNC: 167 MG/DL
GLOBULIN SER CALC-MCNC: 4.2 G/DL (ref 2–4)
GLUCOSE SERPL-MCNC: 91 MG/DL (ref 65–100)
GLUCOSE UR STRIP.AUTO-MCNC: NEGATIVE MG/DL
HCT VFR BLD AUTO: 41.7 % (ref 36.6–50.3)
HGB BLD-MCNC: 13.8 G/DL (ref 12.1–17)
HGB UR QL STRIP: ABNORMAL
IMM GRANULOCYTES # BLD AUTO: 0 K/UL (ref 0–0.04)
IMM GRANULOCYTES NFR BLD AUTO: 0 % (ref 0–0.5)
KETONES UR QL STRIP.AUTO: NEGATIVE MG/DL
LEUKOCYTE ESTERASE UR QL STRIP.AUTO: NEGATIVE
LIPASE SERPL-CCNC: 140 U/L (ref 73–393)
LYMPHOCYTES # BLD: 2.2 K/UL (ref 0.8–3.5)
LYMPHOCYTES NFR BLD: 46 % (ref 12–49)
MCH RBC QN AUTO: 28 PG (ref 26–34)
MCHC RBC AUTO-ENTMCNC: 33.1 G/DL (ref 30–36.5)
MCV RBC AUTO: 84.8 FL (ref 80–99)
METHADONE UR QL: NEGATIVE
MONOCYTES # BLD: 0.4 K/UL (ref 0–1)
MONOCYTES NFR BLD: 8 % (ref 5–13)
MUCOUS THREADS URNS QL MICRO: ABNORMAL /LPF
NEUTS SEG # BLD: 2.2 K/UL (ref 1.8–8)
NEUTS SEG NFR BLD: 45 % (ref 32–75)
NITRITE UR QL STRIP.AUTO: NEGATIVE
NRBC # BLD: 0 K/UL (ref 0–0.01)
NRBC BLD-RTO: 0 PER 100 WBC
OPIATES UR QL: NEGATIVE
P-R INTERVAL, ECG05: 148 MS
PCP UR QL: NEGATIVE
PH UR STRIP: 5 [PH] (ref 5–8)
PLATELET # BLD AUTO: 255 K/UL (ref 150–400)
PMV BLD AUTO: 8.6 FL (ref 8.9–12.9)
POTASSIUM SERPL-SCNC: 3.8 MMOL/L (ref 3.5–5.1)
PROT SERPL-MCNC: 8.1 G/DL (ref 6.4–8.2)
PROT UR STRIP-MCNC: NEGATIVE MG/DL
Q-T INTERVAL, ECG07: 414 MS
QRS DURATION, ECG06: 98 MS
QTC CALCULATION (BEZET), ECG08: 498 MS
RBC # BLD AUTO: 4.92 M/UL (ref 4.1–5.7)
RBC #/AREA URNS HPF: ABNORMAL /HPF (ref 0–5)
SALICYLATES SERPL-MCNC: <1.7 MG/DL (ref 2.8–20)
SODIUM SERPL-SCNC: 139 MMOL/L (ref 136–145)
SP GR UR REFRACTOMETRY: 1.01 (ref 1–1.03)
TROPONIN-HIGH SENSITIVITY: 7 NG/L (ref 0–76)
UA: UC IF INDICATED,UAUC: ABNORMAL
UROBILINOGEN UR QL STRIP.AUTO: 0.1 EU/DL (ref 0.1–1)
VALPROATE SERPL-MCNC: 15 UG/ML (ref 50–100)
VENTRICULAR RATE, ECG03: 87 BPM
WBC # BLD AUTO: 4.9 K/UL (ref 4.1–11.1)
WBC URNS QL MICRO: ABNORMAL /HPF (ref 0–4)

## 2021-12-16 PROCEDURE — 80307 DRUG TEST PRSMV CHEM ANLYZR: CPT

## 2021-12-16 PROCEDURE — 82077 ASSAY SPEC XCP UR&BREATH IA: CPT

## 2021-12-16 PROCEDURE — 80053 COMPREHEN METABOLIC PANEL: CPT

## 2021-12-16 PROCEDURE — 81001 URINALYSIS AUTO W/SCOPE: CPT

## 2021-12-16 PROCEDURE — 87086 URINE CULTURE/COLONY COUNT: CPT

## 2021-12-16 PROCEDURE — 85025 COMPLETE CBC W/AUTO DIFF WBC: CPT

## 2021-12-16 PROCEDURE — 80179 DRUG ASSAY SALICYLATE: CPT

## 2021-12-16 PROCEDURE — 93005 ELECTROCARDIOGRAM TRACING: CPT

## 2021-12-16 PROCEDURE — 84484 ASSAY OF TROPONIN QUANT: CPT

## 2021-12-16 PROCEDURE — 36415 COLL VENOUS BLD VENIPUNCTURE: CPT

## 2021-12-16 PROCEDURE — 80164 ASSAY DIPROPYLACETIC ACD TOT: CPT

## 2021-12-16 PROCEDURE — 83690 ASSAY OF LIPASE: CPT

## 2021-12-16 PROCEDURE — 80143 DRUG ASSAY ACETAMINOPHEN: CPT

## 2021-12-16 PROCEDURE — 99283 EMERGENCY DEPT VISIT LOW MDM: CPT

## 2021-12-16 NOTE — BSMART NOTE
Pt arrived at ED via ambulance and assessed in ED 28     Pt presented with Denies SI and Denies HI     Pt presented with disheveled appearance. Pt thought process loose associations    Pt cognition impaired decision making    Pt reports has been hospitalized once     Most Recent Hospitalizations if any: 10/21/21    Pt reports none    Pt does not have a hx of legal issues. Pt does not have hx of violence/aggression     Pt reports Alcohol use and  Cocaine use    Pt UDS positive for: Cocaine    Hx. Of Substance Treatment: NO  When: Not Applicable  Where: Not Applicable    Highest Level of Education: 12TH    Employment: NO    Source of Income: disability    Housing: Independent Housing    Access to Weapons: YES    If weapons, Have they been removed: NO    Trauma Hx:   Sexual: NO  When:  Not Applicable By Whom:Not Applicable    Physical: NO  When: Not Applicable By Whom:Not Applicable    Verbal: NO  When: Not Applicable By Whom:Not Applicable      Family Support: NO    Who: Pt states No one, estranged from wife      Dr. Yvon Riggins contacted and reports pt does not meet inpatient level of care and will follow up with resources outpatient as needed. This writer notified assigned RN Ilana Cornejo and assigned physician Case. Safety Plan Completed: YES        PATIENT NARRATIVE SUMMARY:  Pt was seen in the ED Rm 28 for a face to face assessment. Pt stated that he was there to \"dry out\". Pt stated that he took Cocaine and 6 40 oz beers. Pt denies SI,HI but does endorse A / V Hallucinations that he regularly has. Pt stated that he is compliant with his medications (takes Depakote). He last took cocaine and beer, and his meds 12/15/2021. Pt talked in a blunted manner, and raised his voice at times to indicate seriousness of answer.   Yvon Riggins stated that the medical DrJay Should prescribe something to help with the detox and if pt is there when he does his rounds, he will do so, or pt can see primary care Dr. Geraldyne Nyhan someone at resources this writer is giving. This writer will follow up as needed.

## 2021-12-16 NOTE — ED PROVIDER NOTES
EMERGENCY DEPARTMENT HISTORY AND PHYSICAL EXAM      Date: 12/16/2021  Patient Name: Christina Nielsen    History of Presenting Illness     Chief Complaint   Patient presents with    Mental Health Problem       History Provided By: Patient    HPI: Christina Nielsen, 62 y.o. male with a past medical history significant hypertension, hyperlipidemia, seizure and alcohol abuse, drug abuse,  presents to the ED with cc of alcohol and cocaine use prior to arrival requesting admission for detoxification. He has a history of alcohol and drug abuse and reports that he had quit using but relapsed and used cocaine just prior to arrival and he is unable to estimate the amount. Also reports that he had 640 ounce beers prior to arrival.  He denies any overdosage of medications or inappropriate taking of his prescribed medications. He reports compliance with his Depakote denies any recent seizures. He denies hallucinations of any kind, he denies current suicidal thoughts ideations or recent attempts since his admission in October of this year for overdose. He denies homicidal ideations. He specifically denies chest pain, shortness of breath, fever, chills, cough, body aches, dysuria or any urinary symptoms after being treated for cystitis earlier this month. He appears clinically intoxicated and is lethargic but easily arousable and converses appropriately recognizing staff from previous admissions. He is cooperative and voluntary. There are no other complaints, changes, or physical findings at this time. PCP: Siddharth Hopper NP    No current facility-administered medications on file prior to encounter. Current Outpatient Medications on File Prior to Encounter   Medication Sig Dispense Refill    citalopram (CELEXA) 10 mg tablet Take 1 Tablet by mouth daily. 30 Tablet 0    divalproex DR (DEPAKOTE) 500 mg tablet Take 1 Tablet by mouth two (2) times a day.  6 Tablet 0    atorvastatin (LIPITOR) 10 mg tablet Take 1 Tablet by mouth nightly. 30 Tablet 0    metoprolol tartrate (LOPRESSOR) 50 mg tablet Take 1 Tablet by mouth two (2) times a day. 30 Tablet 0    naproxen (NAPROSYN) 500 mg tablet Take 1 Tablet by mouth two (2) times daily (with meals). 30 Tablet 0       Past History     Past Medical History:  Past Medical History:   Diagnosis Date    Hypercholesterolemia     Hypertension     Psychiatric disorder     [facility] ([facility]     [facility]     Suicidal thoughts     TBI [facility]traumatic [facility]) [facility]        Past Surgical History:  Past Surgical History:   Procedure Laterality Date    HX NEPHROSTOMY         Family History:  History reviewed. No pertinent family history. Social History:  Social History     Tobacco Use    Smoking status: Never Smoker    Smokeless tobacco: Current User   Vaping Use    Vaping Use: Never used   Substance Use Topics    Alcohol use: Yes     Alcohol/week: 6.0 standard drinks     Types: 6 Cans of beer per week     Comment: 40oz/day    Drug use: Yes     Types: Cocaine     Comment: Last used today       Allergies:  No Known Allergies      Review of Systems     Review of Systems   Constitutional: Negative for appetite change, fatigue and fever. HENT: Negative for congestion, nosebleeds, sinus pressure, sore throat, tinnitus and trouble swallowing. Eyes: Positive for redness. Negative for photophobia and visual disturbance. Respiratory: Negative for cough, chest tightness, shortness of breath and wheezing. Cardiovascular: Negative for chest pain, palpitations and leg swelling. Gastrointestinal: Negative for abdominal pain, blood in stool, constipation, diarrhea, nausea and vomiting. Endocrine: Negative. Genitourinary: Negative for decreased urine volume, difficulty urinating, dysuria, flank pain, hematuria and urgency. Musculoskeletal: Negative for back pain, joint swelling, myalgias and neck pain.         Chronic leg pain \"where the gonzalo is\" at baseline   Skin: Negative. Allergic/Immunologic: Negative. Neurological: Negative for dizziness, tremors, seizures, syncope, weakness, light-headedness and headaches. Hematological: Negative. Psychiatric/Behavioral: Positive for sleep disturbance. Negative for agitation, behavioral problems, confusion, decreased concentration, dysphoric mood, hallucinations, self-injury and suicidal ideas. The patient is not nervous/anxious and is not hyperactive. All other systems reviewed and are negative. Physical Exam     Physical Exam  Vitals and nursing note reviewed. Constitutional:       General: He is not in acute distress. Appearance: He is normal weight. He is not ill-appearing, toxic-appearing or diaphoretic. HENT:      Head: Normocephalic. Right Ear: External ear normal.      Left Ear: External ear normal.      Nose: Nose normal.      Mouth/Throat:      Pharynx: Oropharynx is clear. Eyes:      General: Lids are normal. Vision grossly intact. Gaze aligned appropriately. No scleral icterus. Extraocular Movements:      Right eye: No nystagmus. Left eye: No nystagmus. Pupils: Pupils are equal, round, and reactive to light. Comments: Sclera with uniform redness equal bilaterally without evidence of hemorrhage or exudate he endorses is chronic and not new. Neck:      Vascular: No carotid bruit. Cardiovascular:      Rate and Rhythm: Normal rate and regular rhythm. Pulses: Normal pulses. Heart sounds: Normal heart sounds. No murmur heard. Pulmonary:      Effort: Pulmonary effort is normal. No respiratory distress. Breath sounds: Normal breath sounds. Chest:      Chest wall: No tenderness. Abdominal:      General: Abdomen is flat. Bowel sounds are normal. There is no distension. Palpations: Abdomen is soft. Tenderness: There is no abdominal tenderness. There is no guarding. Musculoskeletal:         General: Normal range of motion.       Cervical back: Normal range of motion and neck supple. No rigidity. Right lower leg: No edema. Left lower leg: No edema. Skin:     General: Skin is warm and dry. Capillary Refill: Capillary refill takes less than 2 seconds. Coloration: Skin is not jaundiced. Findings: No bruising, erythema or rash. Neurological:      General: No focal deficit present. Mental Status: He is oriented to person, place, and time and easily aroused. He is lethargic. Cranial Nerves: Cranial nerves are intact. No dysarthria or facial asymmetry. Sensory: Sensation is intact. Motor: Motor function is intact. No tremor, seizure activity or pronator drift. Gait: Gait is intact. Psychiatric:         Attention and Perception: He does not perceive auditory or visual hallucinations. Speech: Speech is slurred. Behavior: Behavior is cooperative. Thought Content: Thought content is not paranoid. Thought content does not include homicidal or suicidal ideation. Thought content does not include homicidal or suicidal plan. Comments: Unable to fully assess mood, affect, and attention due to intoxication presently but does specifically deny suicidal ideations or homicidal ideations. Lab and Diagnostic Study Results     Labs -     Recent Results (from the past 12 hour(s))   CBC WITH AUTOMATED DIFF    Collection Time: 12/16/21  2:48 AM   Result Value Ref Range    WBC 4.9 4.1 - 11.1 K/uL    RBC 4.92 4.10 - 5.70 M/uL    HGB 13.8 12.1 - 17.0 g/dL    HCT 41.7 36.6 - 50.3 %    MCV 84.8 80.0 - 99.0 FL    MCH 28.0 26.0 - 34.0 PG    MCHC 33.1 30.0 - 36.5 g/dL    RDW 14.9 (H) 11.5 - 14.5 %    PLATELET 658 411 - 035 K/uL    MPV 8.6 (L) 8.9 - 12.9 FL    NRBC 0.0 0.0  WBC    ABSOLUTE NRBC 0.00 0.00 - 0.01 K/uL    NEUTROPHILS 45 32 - 75 %    LYMPHOCYTES 46 12 - 49 %    MONOCYTES 8 5 - 13 %    EOSINOPHILS 0 0 - 7 %    BASOPHILS 1 0 - 1 %    IMMATURE GRANULOCYTES 0 0 - 0.5 %    ABS. NEUTROPHILS 2.2 1.8 - 8.0 K/UL    ABS. LYMPHOCYTES 2.2 0.8 - 3.5 K/UL    ABS. MONOCYTES 0.4 0.0 - 1.0 K/UL    ABS. EOSINOPHILS 0.0 0.0 - 0.4 K/UL    ABS. BASOPHILS 0.0 0.0 - 0.1 K/UL    ABS. IMM. GRANS. 0.0 0.00 - 0.04 K/UL    DF AUTOMATED     METABOLIC PANEL, COMPREHENSIVE    Collection Time: 12/16/21  2:48 AM   Result Value Ref Range    Sodium 139 136 - 145 mmol/L    Potassium 3.8 3.5 - 5.1 mmol/L    Chloride 110 (H) 97 - 108 mmol/L    CO2 21 21 - 32 mmol/L    Anion gap 8 5 - 15 mmol/L    Glucose 91 65 - 100 mg/dL    BUN 11 6 - 20 mg/dL    Creatinine 1.11 0.70 - 1.30 mg/dL    BUN/Creatinine ratio 10 (L) 12 - 20      GFR est AA >60 >60 ml/min/1.73m2    GFR est non-AA >60 >60 ml/min/1.73m2    Calcium 8.6 8.5 - 10.1 mg/dL    Bilirubin, total 0.5 0.2 - 1.0 mg/dL    AST (SGOT) 24 15 - 37 U/L    ALT (SGPT) 23 12 - 78 U/L    Alk. phosphatase 68 45 - 117 U/L    Protein, total 8.1 6.4 - 8.2 g/dL    Albumin 3.9 3.5 - 5.0 g/dL    Globulin 4.2 (H) 2.0 - 4.0 g/dL    A-G Ratio 0.9 (L) 1.1 - 2.2     ETHYL ALCOHOL    Collection Time: 12/16/21  2:48 AM   Result Value Ref Range    ALCOHOL(ETHYL),SERUM 167 (H) <10 mg/dL   LIPASE    Collection Time: 12/16/21  2:48 AM   Result Value Ref Range    Lipase 140 73 - 882 U/L   SALICYLATE    Collection Time: 12/16/21  2:48 AM   Result Value Ref Range    Salicylate level <8.0 (L) 2.8 - 20.0 mg/dL   ACETAMINOPHEN    Collection Time: 12/16/21  2:48 AM   Result Value Ref Range    Acetaminophen level <10 (L) 10 - 30 ug/mL   TROPONIN-HIGH SENSITIVITY    Collection Time: 12/16/21  2:48 AM   Result Value Ref Range    Troponin-High Sensitivity 7 0 - 76 ng/L   VALPROIC ACID    Collection Time: 12/16/21  2:48 AM   Result Value Ref Range    Valproic acid 15 (L) 50 - 100 ug/mL       Radiologic Studies -   @lastxrresult@  CT Results  (Last 48 hours)    None        CXR Results  (Last 48 hours)    None            Medical Decision Making   - I am the first provider for this patient.     - I reviewed the vital signs, available nursing notes, past medical history, past surgical history, family history and social history. - Initial assessment performed. The patients presenting problems have been discussed, and they are in agreement with the care plan formulated and outlined with them. I have encouraged them to ask questions as they arise throughout their visit. Vital Signs-Reviewed the patient's vital signs. Patient Vitals for the past 12 hrs:   Temp Pulse Resp BP SpO2   12/16/21 0148 97.6 °F (36.4 °C) 87 18 132/80 95 %       Records Reviewed: Nursing Notes, Old Medical Records, Previous electrocardiograms, Previous Radiology Studies and Previous Laboratory Studies    The patient presents with alcohol and drug abuse with a differential diagnosis of acute intoxication, depression, metabolic disturbance, overdose, ACS, alcohol intoxication, psychosis, suicidal ideations, homicidal ideations, dehydration, ACS secondary to cocaine abuse      ED Course:     ED Course as of 12/16/21 0558   Thu Dec 16, 2021   0237 Dr. Carol Lewis consulted and notified of patient's complaints, history, and voluntary request for admission for drug and alcohol abuse. He denies SI/HI or any hallucinations. Agrees with work up and he can be evaluated by behavioral health in the morning after assessment for acute intoxication and any need for medical intervention. [KR]   7504 Patient discussed with Dr. Villalobos Patience in sign-out to assume observation of patient and care while awaiting psychiatric evaluation this morning. Urine and Urine drug screen still pending. Aware of previous UTI with treatment and need for assessment of effectiveness of treatment. Depakote level low and can be addressed by psychiatry. Patient has remained without evidence of alcohol withdrawal, agitation, tremors, and has slept without distress since arrival but is easily arousable.   [KR]      ED Course User Index  [KR] Bernardo Montemayor NP       Provider Notes (Medical Decision Making):     MDM  Number of Diagnoses or Management Options  Alcohol abuse: new, needed workup  Drug abuse ([facility]): new, needed workup  Medical clearance for psychiatric admission: new, needed workup     Amount and/or Complexity of Data Reviewed  Clinical lab tests: ordered and reviewed  Review and summarize past medical records: yes  Discuss the patient with other providers: yes  Independent visualization of images, tracings, or specimens: yes    Risk of Complications, Morbidity, and/or Mortality  Presenting problems: moderate  Diagnostic procedures: minimal  Management options: minimal           Disposition   Disposition: Condition stable  DC- Adult Discharges: All of the diagnostic tests were reviewed and questions answered. Diagnosis, care plan and treatment options were discussed. The patient understands the instructions and will follow up as directed. The patients results have been reviewed with them. They have been counseled regarding their diagnosis. The patient verbally convey understanding and agreement of the signs, symptoms, diagnosis, treatment and prognosis and additionally agrees to follow up as recommended with their PCP in 24 - 48 hours. They also agree with the care-plan and convey that all of their questions have been answered. I have also put together some discharge instructions for them that include: 1) educational information regarding their diagnosis, 2) how to care for their diagnosis at home, as well a 3) list of reasons why they would want to return to the ED prior to their follow-up appointment, should their condition change. Discharged     ADDENDUM 12/16/21: Chart review and completion noted patient was evaluated by behavioral health and discharge by TIFFANIE Caldwell MD in stable condition per documentation. DISCHARGE PLAN:  1.    Current Discharge Medication List      CONTINUE these medications which have NOT CHANGED    Details   citalopram (CELEXA) 10 mg tablet Take 1 Tablet by mouth daily.  Qty: 30 Tablet, Refills: 0      divalproex DR (DEPAKOTE) 500 mg tablet Take 1 Tablet by mouth two (2) times a day. Qty: 6 Tablet, Refills: 0      atorvastatin (LIPITOR) 10 mg tablet Take 1 Tablet by mouth nightly. Qty: 30 Tablet, Refills: 0      metoprolol tartrate (LOPRESSOR) 50 mg tablet Take 1 Tablet by mouth two (2) times a day. Qty: 30 Tablet, Refills: 0      naproxen (NAPROSYN) 500 mg tablet Take 1 Tablet by mouth two (2) times daily (with meals). Qty: 30 Tablet, Refills: 0           2. Follow-up Information    None       3. Return to ED if worse   4. Current Discharge Medication List            Diagnosis     Clinical Impression:   1. Alcohol abuse    2. Medical clearance for psychiatric admission    3. Drug abuse [facility])        Attestations:    Lynne Raya NP    Please note that this dictation was completed with [employer], the computer voice recognition software. Quite often unanticipated grammatical, syntax, homophones, and other interpretive errors are inadvertently transcribed by the computer software. Please disregard these errors. Please excuse any errors that have escaped final proofreading. Thank you.

## 2021-12-16 NOTE — ED TRIAGE NOTES
pt called for detox. states he relaped on crack which he just did prior to arrival and drank today 6 -40's. and wants help.

## 2021-12-16 NOTE — SUICIDE SAFETY PLAN
SAFETY PLAN    A suicide Safety Plan is a document that supports someone when they are having thoughts of suicide. Warning Signs that indicate a suicidal crisis may be developing: What (situations, thoughts, feelings, body sensations, behaviors, etc.) do you experience that lets you know you are beginning to think about suicide? 1. Unable to cope  2. Not wanting to live  3. Thinking life is too hard    Internal Coping Strategies:  What things can I do (relaxation techniques, hobbies, physical activities, etc.) to take my mind off my problems without contacting another person? 1. Watch TV  2. Walk outside  3. [place]    People and social settings that provide distraction: Who can I call or where can I go to distract me? 1. Name: Phone:   2. Name: Phone:   3. Place: [employer]            4. Place:N/A    People whom I can ask for help: Who can I call when I need help - for example, friends, family, clergy, someone else? 1. Name: [facility]               Phone: [employer]  2. Name: [facility] Phone: [phone]  3. Name:   Phone:    Professionals or [address] [place] [address] I can contact during a crisis: Who can I call for help - for example, my doctor, my psychiatrist, my psychologist, a mental health provider, a suicide hotline? 1. [address]   Phone: [phone]      Clinician Pager or Emergency Contact #: [address] [facility]-[phone]      3. Suicide Prevention Lifeline: 1-994-655-TALK (2387)    4. [address] [place] [address] Emergency Services -  for example, [facility] suicide hotline, [facility] Hotline: [place]       Making the environment safe: How can I make my environment (house/apartment/living space) safer? For example, can I remove guns, medications, and other items? 1. Continue to not let dealers know where you live  2. Attend NA and AA meetings

## 2021-12-17 LAB
BACTERIA SPEC CULT: NORMAL
COLONY COUNT,CNT: NORMAL
COLONY COUNT,CNT: NORMAL
SPECIAL REQUESTS,SREQ: NORMAL

## 2021-12-24 ENCOUNTER — HOSPITAL ENCOUNTER (EMERGENCY)
Age: 61
Discharge: HOME | End: 2021-12-24
Attending: EMERGENCY MEDICINE
Payer: MEDICARE

## 2021-12-24 VITALS
BODY MASS INDEX: 23.7 KG/M2 | OXYGEN SATURATION: 96 % | HEIGHT: 69 IN | HEART RATE: 86 BPM | WEIGHT: 160 LBS | DIASTOLIC BLOOD PRESSURE: 70 MMHG | RESPIRATION RATE: 18 BRPM | TEMPERATURE: 98.3 F | SYSTOLIC BLOOD PRESSURE: 105 MMHG

## 2021-12-24 DIAGNOSIS — F10.10 ALCOHOL ABUSE: Primary | ICD-10-CM

## 2021-12-24 PROCEDURE — 99284 EMERGENCY DEPT VISIT MOD MDM: CPT

## 2021-12-24 NOTE — ED PROVIDER NOTES
EMERGENCY DEPARTMENT HISTORY AND PHYSICAL EXAM      Date: 12/24/2021  Patient Name: Valarie Lewis    History of Presenting Illness     Chief Complaint   Patient presents with    Alcohol Problem       History Provided By: Patient    HPI: Valarie Lewis, 62 y.o. male with a past medical history significant hypertension, hyperlipidemia and Substance abuse presents to the ED with cc of being intoxicated and needing to dry out. He said his last drink was a few hours ago. He denies any fever, chills, nausea, vomiting, chest pain, shortness of breath, rash, diarrhea, headache, night sweats, suicidal thoughts. There are no other complaints, changes, or physical findings at this time. PCP: Ezekiel Godwin NP    No current facility-administered medications on file prior to encounter. Current Outpatient Medications on File Prior to Encounter   Medication Sig Dispense Refill    citalopram (CELEXA) 10 mg tablet Take 1 Tablet by mouth daily. 30 Tablet 0    divalproex DR (DEPAKOTE) 500 mg tablet Take 1 Tablet by mouth two (2) times a day. 6 Tablet 0    atorvastatin (LIPITOR) 10 mg tablet Take 1 Tablet by mouth nightly. 30 Tablet 0    metoprolol tartrate (LOPRESSOR) 50 mg tablet Take 1 Tablet by mouth two (2) times a day. 30 Tablet 0    naproxen (NAPROSYN) 500 mg tablet Take 1 Tablet by mouth two (2) times daily (with meals). 30 Tablet 0       Past History     Past Medical History:  Past Medical History:   Diagnosis Date    Hypercholesterolemia     Hypertension     Psychiatric disorder     [facility]     Substance abuse ([facility]     Suicidal thoughts     TBI (traumatic brain injury) ([facility]        Past Surgical History:  Past Surgical History:   Procedure Laterality Date    HX NEPHROSTOMY         Family History:  History reviewed. No pertinent family history.     Social History:  Social History     Tobacco Use    Smoking status: Never Smoker    Smokeless tobacco: Current User   Vaping Use    Vaping Use: Never used   Substance Use Topics    Alcohol use: Yes     Alcohol/week: 6.0 standard drinks     Types: 6 Cans of beer per week     Comment: 40oz/day    Drug use: Yes     Types: Cocaine     Comment: Last used today       Allergies:  No Known Allergies      Review of Systems     Review of Systems   Constitutional: Negative. Negative for appetite change, chills, fatigue and fever. HENT: Negative. Negative for congestion and sinus pain. Eyes: Negative. Negative for pain and visual disturbance. Respiratory: Negative. Negative for chest tightness and shortness of breath. Cardiovascular: Negative. Negative for chest pain. Gastrointestinal: Negative. Negative for abdominal pain, diarrhea, nausea and vomiting. Genitourinary: Negative. Negative for difficulty urinating. No discharge   Musculoskeletal: Negative. Negative for arthralgias. Skin: Negative. Negative for rash. Neurological: Negative. Negative for weakness and headaches. Hematological: Negative. Psychiatric/Behavioral: Negative. Negative for agitation. The patient is not nervous/anxious. All other systems reviewed and are negative. Physical Exam     Physical Exam  Vitals and nursing note reviewed. Constitutional:       General: He is not in acute distress. Appearance: He is well-developed. HENT:      Head: Normocephalic and atraumatic. Nose: Nose normal.      Mouth/Throat:      Mouth: Mucous membranes are moist.      Pharynx: Oropharynx is clear. No oropharyngeal exudate. Eyes:      General:         Right eye: No discharge. Left eye: No discharge. Conjunctiva/sclera: Conjunctivae normal.      Pupils: Pupils are equal, round, and reactive to light. Cardiovascular:      Rate and Rhythm: Normal rate and regular rhythm. Chest Wall: PMI is not displaced. No thrill. Heart sounds: Normal heart sounds. No murmur heard. No friction rub. No gallop.     Pulmonary:      Effort: Pulmonary effort is normal. No respiratory distress. Breath sounds: Normal breath sounds. No wheezing or rales. Chest:      Chest wall: No tenderness. Abdominal:      General: Bowel sounds are normal. There is no distension. Palpations: Abdomen is soft. There is no mass. Tenderness: There is no abdominal tenderness. There is no guarding or rebound. Musculoskeletal:         General: Normal range of motion. Cervical back: Normal range of motion and neck supple. Lymphadenopathy:      Cervical: No cervical adenopathy. Skin:     General: Skin is warm and dry. Capillary Refill: Capillary refill takes less than 2 seconds. Findings: No erythema or rash. Neurological:      Mental Status: He is alert and oriented to person, place, and time. Cranial Nerves: No cranial nerve deficit. Coordination: Coordination normal.   Psychiatric:         Mood and Affect: Mood normal.         Behavior: Behavior normal.      Comments: Patient appears intoxicated         Lab and Diagnostic Study Results     Labs -   No results found for this or any previous visit (from the past 12 hour(s)). Radiologic Studies -   @lastxrresult@  CT Results  (Last 48 hours)    None        CXR Results  (Last 48 hours)    None            Medical Decision Making   - I am the first provider for this patient. - I reviewed the vital signs, available nursing notes, past medical history, past surgical history, family history and social history. - Initial assessment performed. The patients presenting problems have been discussed, and they are in agreement with the care plan formulated and outlined with them. I have encouraged them to ask questions as they arise throughout their visit. Vital Signs-Reviewed the patient's vital signs.   Patient Vitals for the past 12 hrs:   Temp Pulse Resp BP SpO2   12/24/21 0745 98.3 °F (36.8 °C) 86 18 105/70 96 %   12/24/21 0744 97.7 °F (36.5 °C) 89 16 112/72 100 %   Patient is acutely intoxicated would like to sleep it off. ED Course:          Provider Notes (Medical Decision Making):   56-year-old male intoxicated refusing any type of work-up at this point time. He is now awake and no longer intoxicated clinically. He is tolerating p.o. would like to go home  MDM       Procedures   Medical Decision Makingedical Decision Making  Performed by: Iker Lang MD  PROCEDURES:  Procedures       Disposition   Disposition: Condition stable    Discharged    DISCHARGE PLAN:  1. Current Discharge Medication List      CONTINUE these medications which have NOT CHANGED    Details   citalopram (CELEXA) 10 mg tablet Take 1 Tablet by mouth daily. Qty: 30 Tablet, Refills: 0      divalproex DR (DEPAKOTE) 500 mg tablet Take 1 Tablet by mouth two (2) times a day. Qty: 6 Tablet, Refills: 0      atorvastatin (LIPITOR) 10 mg tablet Take 1 Tablet by mouth nightly. Qty: 30 Tablet, Refills: 0      metoprolol tartrate (LOPRESSOR) 50 mg tablet Take 1 Tablet by mouth two (2) times a day. Qty: 30 Tablet, Refills: 0      naproxen (NAPROSYN) 500 mg tablet Take 1 Tablet by mouth two (2) times daily (with meals). Qty: 30 Tablet, Refills: 0           2. Follow-up Information     Follow up With Specialties Details Why Contact Info    Ly Andrea NP Nurse Practitioner Call in 2 days  [address]  [phone]          3. Return to ED if worse   4. Current Discharge Medication List            Diagnosis     Clinical Impression:   1. Alcohol abuse        Attestations:    Iker Lang MD    Please note that this dictation was completed with [employer] the [employer] voice recognition software. Quite often unanticipated grammatical, syntax, homophones, and other interpretive errors are inadvertently transcribed by the computer software. Please disregard these errors. Please excuse any errors that have escaped final proofreading. Thank you.

## 2021-12-24 NOTE — ED TRIAGE NOTES
ETOH abuse, reports recent relapse, chronic left leg pain, reports prev surgical correction pins/rods.

## 2022-01-30 ENCOUNTER — HOSPITAL ENCOUNTER (EMERGENCY)
Age: 62
Discharge: HOME | End: 2022-01-31
Attending: EMERGENCY MEDICINE
Payer: MEDICARE

## 2022-01-30 VITALS
TEMPERATURE: 98.5 F | HEIGHT: 67 IN | OXYGEN SATURATION: 100 % | WEIGHT: 135 LBS | DIASTOLIC BLOOD PRESSURE: 81 MMHG | SYSTOLIC BLOOD PRESSURE: 131 MMHG | RESPIRATION RATE: 16 BRPM | HEART RATE: 88 BPM | BODY MASS INDEX: 21.19 KG/M2

## 2022-01-30 DIAGNOSIS — F10.10 ALCOHOL ABUSE: Primary | ICD-10-CM

## 2022-01-30 LAB
ALBUMIN SERPL-MCNC: 3.5 G/DL (ref 3.5–5)
ALBUMIN/GLOB SERPL: 0.8 {RATIO} (ref 1.1–2.2)
ALP SERPL-CCNC: 59 U/L (ref 45–117)
ALT SERPL-CCNC: 24 U/L (ref 12–78)
AMPHET UR QL SCN: NEGATIVE
ANION GAP SERPL CALC-SCNC: 8 MMOL/L (ref 5–15)
AST SERPL W P-5'-P-CCNC: 17 U/L (ref 15–37)
BARBITURATES UR QL SCN: NEGATIVE
BASOPHILS # BLD: 0 K/UL (ref 0–0.1)
BASOPHILS NFR BLD: 0 % (ref 0–1)
BENZODIAZ UR QL: NEGATIVE
BILIRUB SERPL-MCNC: 0.2 MG/DL (ref 0.2–1)
BUN SERPL-MCNC: 12 MG/DL (ref 6–20)
BUN/CREAT SERPL: 12 (ref 12–20)
CA-I BLD-MCNC: 8.8 MG/DL (ref 8.5–10.1)
CANNABINOIDS UR QL SCN: NEGATIVE
CHLORIDE SERPL-SCNC: 113 MMOL/L (ref 97–108)
CO2 SERPL-SCNC: 19 MMOL/L (ref 21–32)
COCAINE UR QL SCN: NEGATIVE
CREAT SERPL-MCNC: 1.03 MG/DL (ref 0.7–1.3)
DIFFERENTIAL METHOD BLD: ABNORMAL
DRUG SCRN COMMENT,DRGCM: NORMAL
EOSINOPHIL # BLD: 0 K/UL (ref 0–0.4)
EOSINOPHIL NFR BLD: 1 % (ref 0–7)
ERYTHROCYTE [DISTWIDTH] IN BLOOD BY AUTOMATED COUNT: 14.8 % (ref 11.5–14.5)
ETHANOL SERPL-MCNC: 143 MG/DL
GLOBULIN SER CALC-MCNC: 4.3 G/DL (ref 2–4)
GLUCOSE SERPL-MCNC: 103 MG/DL (ref 65–100)
HCT VFR BLD AUTO: 41.3 % (ref 36.6–50.3)
HGB BLD-MCNC: 13.4 G/DL (ref 12.1–17)
IMM GRANULOCYTES # BLD AUTO: 0 K/UL (ref 0–0.04)
IMM GRANULOCYTES NFR BLD AUTO: 0 % (ref 0–0.5)
LYMPHOCYTES # BLD: 1.8 K/UL (ref 0.8–3.5)
LYMPHOCYTES NFR BLD: 44 % (ref 12–49)
MCH RBC QN AUTO: 27.7 PG (ref 26–34)
MCHC RBC AUTO-ENTMCNC: 32.4 G/DL (ref 30–36.5)
MCV RBC AUTO: 85.5 FL (ref 80–99)
METHADONE UR QL: NEGATIVE
MONOCYTES # BLD: 0.4 K/UL (ref 0–1)
MONOCYTES NFR BLD: 10 % (ref 5–13)
NEUTS SEG # BLD: 1.9 K/UL (ref 1.8–8)
NEUTS SEG NFR BLD: 45 % (ref 32–75)
NRBC # BLD: 0 K/UL (ref 0–0.01)
NRBC BLD-RTO: 0 PER 100 WBC
OPIATES UR QL: NEGATIVE
PCP UR QL: NEGATIVE
PLATELET # BLD AUTO: 256 K/UL (ref 150–400)
PMV BLD AUTO: 8.8 FL (ref 8.9–12.9)
POTASSIUM SERPL-SCNC: 3.8 MMOL/L (ref 3.5–5.1)
PROT SERPL-MCNC: 7.8 G/DL (ref 6.4–8.2)
RBC # BLD AUTO: 4.83 M/UL (ref 4.1–5.7)
SODIUM SERPL-SCNC: 140 MMOL/L (ref 136–145)
WBC # BLD AUTO: 4.1 K/UL (ref 4.1–11.1)

## 2022-01-30 PROCEDURE — 85025 COMPLETE CBC W/AUTO DIFF WBC: CPT

## 2022-01-30 PROCEDURE — 80053 COMPREHEN METABOLIC PANEL: CPT

## 2022-01-30 PROCEDURE — 82077 ASSAY SPEC XCP UR&BREATH IA: CPT

## 2022-01-30 PROCEDURE — 99283 EMERGENCY DEPT VISIT LOW MDM: CPT

## 2022-01-30 PROCEDURE — 81001 URINALYSIS AUTO W/SCOPE: CPT

## 2022-01-30 PROCEDURE — 80307 DRUG TEST PRSMV CHEM ANLYZR: CPT

## 2022-01-30 PROCEDURE — 36415 COLL VENOUS BLD VENIPUNCTURE: CPT

## 2022-01-31 LAB
APPEARANCE UR: CLEAR
BACTERIA URNS QL MICRO: NEGATIVE /HPF
BILIRUB UR QL: NEGATIVE
COLOR UR: NORMAL
ETHANOL SERPL-MCNC: 55 MG/DL
GLUCOSE UR STRIP.AUTO-MCNC: NEGATIVE MG/DL
HGB UR QL STRIP: NEGATIVE
KETONES UR QL STRIP.AUTO: NEGATIVE MG/DL
LEUKOCYTE ESTERASE UR QL STRIP.AUTO: NEGATIVE
MUCOUS THREADS URNS QL MICRO: NORMAL /LPF
NITRITE UR QL STRIP.AUTO: NEGATIVE
PH UR STRIP: 5 [PH] (ref 5–8)
PROT UR STRIP-MCNC: NEGATIVE MG/DL
RBC #/AREA URNS HPF: NORMAL /HPF (ref 0–5)
SP GR UR REFRACTOMETRY: 1.01 (ref 1–1.03)
UA: UC IF INDICATED,UAUC: NORMAL
UROBILINOGEN UR QL STRIP.AUTO: 0.1 EU/DL (ref 0.1–1)
WBC URNS QL MICRO: NORMAL /HPF (ref 0–4)

## 2022-01-31 PROCEDURE — 82077 ASSAY SPEC XCP UR&BREATH IA: CPT

## 2022-01-31 PROCEDURE — 36415 COLL VENOUS BLD VENIPUNCTURE: CPT

## 2022-01-31 NOTE — BSMART NOTE
Pt arrived at ED via ambulance and assessed in ED 26    Pt presented with Denies SI and Denies HI     Pt presented with disheveled appearance. Pt thought process tangential    Pt cognition decreased attention/concentration    Pt reports has been hospitalized unknown times     Most Recent Hospitalizations if any: unknown    Pt reports none    Pt does not have a hx of legal issues. Pt does not have hx of violence/aggression     Pt reports Alcohol use and  Cocaine use    Pt UDS positive for: pending    Hx. Of Substance Treatment: NO  When: Not Applicable  Where: Not Applicable    Highest Level of Education: unknown    Employment: NO    Source of Income: social security    [facility]    Access to Weapons: YES    If weapons, Have they been removed: NO    Decision Making:    Does Patient have a guardian/POA: NO    If so, Name of Guardian/POA: n/a     Contact Information: n/a     Was Paperwork Provided?: NO    If not, Was it Requested:NO                                                                      Who to Follow Up With for Paperwork: n/a    Was Information Emailed to Director and Supervisor: NO    Trauma Hx:   Sexual: NO  When:  Not Applicable By Whom:Not Applicable    Physical: NO  When: Not Applicable By Whom:Not Applicable    Verbal: NO  When: Not Applicable By Whom:Not Applicable      Family Support: YES    Who: \"family and Spiritism friends\"      Dr. Muriel Lugo contacted and reports pt does not meet inpatient level of care and will follow up with resources outpatient as needed. This writer notified assigned Godfrey Callejas RN. Safety Plan Completed: NO        PATIENT NARRATIVE SUMMARY:    Patient assessed in ER room 26. Patient calm during assessment. However, patient preoccupied on phone during assessment. Patient states he called EMS because he \"wants help with alcohol problem. \" He reports drinking \"40 quarts of alcohol a day\" and admits to cocaine use.  He denies SI, HI and hallucinations. Patient states he was admitted to [facility] behavioral health \"years ago. \" He reports Hx of seizures. As this writer was exiting the room, patient stated \"and can you tell that doctor to bring me a sandwich when you talk to him, I've been asking for a sandwich. \"      This writer will follow up as needed.

## 2022-01-31 NOTE — ED PROVIDER NOTES
EMERGENCY DEPARTMENT HISTORY AND PHYSICAL EXAM      Date: 1/30/2022  Patient Name: Arturo Apodaca    History of Presenting Illness     No chief complaint on file. History Provided By: Patient    HPI: Arturo Apodaca, 62 y.o. male with a past medical history significant No significant past medical history presents to the ED with chief complaint of No chief complaint on file. .   81-year-old male with a history of alcohol abuse. Has never had any withdrawal seizures. Has been admitted before. Wants to be admitted to help with his detox. Last drink was before arrival.  Drinks daily. No active suicidal homicidal thoughts. There are no other complaints, changes, or physical findings at this time. PCP: Grant Mcguire NP    Current Outpatient Medications   Medication Sig Dispense Refill    citalopram (CELEXA) 10 mg tablet Take 1 Tablet by mouth daily. 30 Tablet 0    divalproex DR (DEPAKOTE) 500 mg tablet Take 1 Tablet by mouth two (2) times a day. 6 Tablet 0    atorvastatin (LIPITOR) 10 mg tablet Take 1 Tablet by mouth nightly. 30 Tablet 0    metoprolol tartrate (LOPRESSOR) 50 mg tablet Take 1 Tablet by mouth two (2) times a day. 30 Tablet 0    naproxen (NAPROSYN) 500 mg tablet Take 1 Tablet by mouth two (2) times daily (with meals). 30 Tablet 0       Past History     Past Medical History:  Past Medical History:   Diagnosis Date    Hypercholesterolemia     Hypertension     Psychiatric disorder     [facility]     Substance abuse ([facility]     Suicidal thoughts     TBI (traumatic brain injury) ([facility]        Past Surgical History:  Past Surgical History:   Procedure Laterality Date    HX NEPHROSTOMY         Family History: denies    Social History:  Social History     Tobacco Use    Smoking status: Never Smoker    Smokeless tobacco: Current User   Vaping Use    Vaping Use: Never used   Substance Use Topics    Alcohol use:  Yes     Alcohol/week: 6.0 standard drinks     Types: 6 Cans of beer per week     Comment: 40oz/day    Drug use: Yes     Types: Cocaine     Comment: Last used today       Allergies:  No Known Allergies      Review of Systems   Review of Systems   Constitutional: Negative. Negative for chills, fatigue and fever. HENT: Negative. Negative for congestion, ear pain, nosebleeds and sore throat. Eyes: Negative. Negative for pain, discharge and visual disturbance. Respiratory: Negative. Negative for cough, chest tightness and shortness of breath. Cardiovascular: Negative. Negative for chest pain and leg swelling. Gastrointestinal: Negative. Negative for abdominal pain, blood in stool, constipation, diarrhea, nausea and vomiting. Endocrine: Negative. Genitourinary: Negative. Negative for difficulty urinating, dysuria and flank pain. Musculoskeletal: Negative. Negative for back pain and myalgias. Skin: Negative. Negative for rash and wound. Allergic/Immunologic: Negative. Neurological: Negative. Negative for dizziness, syncope, weakness, numbness and headaches. Hematological: Negative. Does not bruise/bleed easily. Psychiatric/Behavioral: Negative. Negative for agitation, confusion, hallucinations and suicidal ideas. All other systems reviewed and are negative. Physical Exam   Physical Exam  Vitals and nursing note reviewed. Constitutional:       General: He is not in acute distress. Appearance: He is normal weight. He is not ill-appearing. HENT:      Head: Normocephalic and atraumatic. Right Ear: External ear normal.      Left Ear: External ear normal.      Nose: Nose normal. No rhinorrhea. Mouth/Throat:      Mouth: Mucous membranes are moist.      Pharynx: Oropharynx is clear. Eyes:      Extraocular Movements: Extraocular movements intact. Conjunctiva/sclera: Conjunctivae normal.      Pupils: Pupils are equal, round, and reactive to light. Cardiovascular:      Rate and Rhythm: Normal rate and regular rhythm. Pulses: Normal pulses. Heart sounds: Normal heart sounds. Pulmonary:      Effort: Pulmonary effort is normal. No respiratory distress. Breath sounds: Normal breath sounds. Abdominal:      General: Abdomen is flat. Bowel sounds are normal.      Palpations: Abdomen is soft. Musculoskeletal:         General: No tenderness or deformity. Normal range of motion. Cervical back: Normal range of motion and neck supple. Skin:     General: Skin is warm and dry. Capillary Refill: Capillary refill takes less than 2 seconds. Findings: No bruising, lesion or rash. Neurological:      General: No focal deficit present. Mental Status: He is alert and oriented to person, place, and time. Mental status is at baseline. Psychiatric:         Mood and Affect: Mood normal.         Behavior: Behavior normal.         Thought Content: Thought content normal.         Judgment: Judgment normal.         Diagnostic Study Results     Labs -   No results found for this or any previous visit (from the past 12 hour(s)). Radiologic Studies -   No orders to display     CT Results  (Last 48 hours)    None        CXR Results  (Last 48 hours)    None          Medical Decision Making and ED Course   I am the first provider for this patient. I reviewed the vital signs, available nursing notes, past medical history, past surgical history, family history and social history. Vital Signs-Reviewed the patient's vital signs. Patient Vitals for the past 12 hrs:   Temp Pulse Resp BP SpO2   01/30/22 2147 98.5 °F (36.9 °C) 88 16 131/81 100 %       EKG interpretation:         Records Reviewed: Previous Hospital chart. EMS run report      ED Course:   Initial assessment performed. The patients presenting problems have been discussed, and they are in agreement with the care plan formulated and outlined with them. I have encouraged them to ask questions as they arise throughout their visit.     Orders Placed This Encounter    COVID-19 WITH INFLUENZA A/B     Standing Status:   Standing     Number of Occurrences:   1     Order Specific Question:   Is this test for diagnosis or screening? Answer:   Diagnosis of ill patient     Order Specific Question:   Symptomatic for COVID-19 as defined by CDC? Answer:   Yes     Order Specific Question:   Date of Symptom Onset     Answer:   1/30/2022     Order Specific Question:   Hospitalized for COVID-19? Answer:   No     Order Specific Question:   Admitted to ICU for COVID-19? Answer:   No     Order Specific Question:   Employed in healthcare setting? Answer:   No     Order Specific Question:   Resident in a congregate (group) care setting? Answer:   No     Order Specific Question:   Previously tested for COVID-19? Answer: Yes    CBC WITH AUTOMATED DIFF     Standing Status:   Standing     Number of Occurrences:   1    METABOLIC PANEL, COMPREHENSIVE     Standing Status:   Standing     Number of Occurrences:   1    ETHYL ALCOHOL     Standing Status:   Standing     Number of Occurrences:   1    DRUG SCREEN, URINE     Standing Status:   Standing     Number of Occurrences:   1    URINALYSIS W/ REFLEX CULTURE     Standing Status:   Standing     Number of Occurrences:   1    VALPROIC ACID     Standing Status:   Standing     Number of Occurrences:   1    IP CONSULT TO BSMART     Standing Status:   Standing     Number of Occurrences:   1     Order Specific Question:   Reason for Consult: Answer:   Assess pt. Voluntary. Provider Notes (Medical Decision Making):   81-year-old with a history of alcohol abuse requesting help with detox from alcohol. Last drink was just prior to arrival.  No evidence of withdrawal symptoms. Plan to have patient evaluated by behavioral health for candidate for admission.       [facility]    Procedures                       Disposition       Emergency Department Disposition: Behavioral Health Hold      Diagnosis     Clinical Impression:   1. Alcohol abuse        Attestations:    Anupam Osorio MD    Please note that this dictation was completed with [employer], the computer voice recognition software. Quite often unanticipated grammatical, syntax, homophones, and other interpretive errors are inadvertently transcribed by the computer software. Please disregard these errors. Please excuse any errors that have escaped final proofreading. Thank you.

## 2022-01-31 NOTE — DISCHARGE INSTRUCTIONS
Call Dr. Jyoti Pritchett for your psychiatry follow-up. Return to the ER for any reason. Thank you! Thank you for allowing me to care for you in the emergency department. I sincerely hope that you are satisfied with your visit today. It is my goal to provide you with excellent care. Below you will find a list of your labs and imaging from your visit today. Should you have any questions regarding these results please do not hesitate to call the emergency department. Labs -     Recent Results (from the past 12 hour(s))   CBC WITH AUTOMATED DIFF    Collection Time: 01/30/22 10:05 PM   Result Value Ref Range    WBC 4.1 4.1 - 11.1 K/uL    RBC 4.83 4.10 - 5.70 M/uL    HGB 13.4 12.1 - 17.0 g/dL    HCT 41.3 36.6 - 50.3 %    MCV 85.5 80.0 - 99.0 FL    MCH 27.7 26.0 - 34.0 PG    MCHC 32.4 30.0 - 36.5 g/dL    RDW 14.8 (H) 11.5 - 14.5 %    PLATELET 913 947 - 772 K/uL    MPV 8.8 (L) 8.9 - 12.9 FL    NRBC 0.0 0.0  WBC    ABSOLUTE NRBC 0.00 0.00 - 0.01 K/uL    NEUTROPHILS 45 32 - 75 %    LYMPHOCYTES 44 12 - 49 %    MONOCYTES 10 5 - 13 %    EOSINOPHILS 1 0 - 7 %    BASOPHILS 0 0 - 1 %    IMMATURE GRANULOCYTES 0 0 - 0.5 %    ABS. NEUTROPHILS 1.9 1.8 - 8.0 K/UL    ABS. LYMPHOCYTES 1.8 0.8 - 3.5 K/UL    ABS. MONOCYTES 0.4 0.0 - 1.0 K/UL    ABS. EOSINOPHILS 0.0 0.0 - 0.4 K/UL    ABS. BASOPHILS 0.0 0.0 - 0.1 K/UL    ABS. IMM.  GRANS. 0.0 0.00 - 0.04 K/UL    DF AUTOMATED     METABOLIC PANEL, COMPREHENSIVE    Collection Time: 01/30/22 10:05 PM   Result Value Ref Range    Sodium 140 136 - 145 mmol/L    Potassium 3.8 3.5 - 5.1 mmol/L    Chloride 113 (H) 97 - 108 mmol/L    CO2 19 (L) 21 - 32 mmol/L    Anion gap 8 5 - 15 mmol/L    Glucose 103 (H) 65 - 100 mg/dL    BUN 12 6 - 20 mg/dL    Creatinine 1.03 0.70 - 1.30 mg/dL    BUN/Creatinine ratio 12 12 - 20      GFR est AA >60 >60 ml/min/1.73m2    GFR est non-AA >60 >60 ml/min/1.73m2    Calcium 8.8 8.5 - 10.1 mg/dL    Bilirubin, total 0.2 0.2 - 1.0 mg/dL    AST (SGOT) 17 15 - 37 U/L ALT (SGPT) 24 12 - 78 U/L    Alk. phosphatase 59 45 - 117 U/L    Protein, total 7.8 6.4 - 8.2 g/dL    Albumin 3.5 3.5 - 5.0 g/dL    Globulin 4.3 (H) 2.0 - 4.0 g/dL    A-G Ratio 0.8 (L) 1.1 - 2.2     ETHYL ALCOHOL    Collection Time: 01/30/22 10:05 PM   Result Value Ref Range    ALCOHOL(ETHYL),SERUM 143 (H) <10 mg/dL   DRUG SCREEN, URINE    Collection Time: 01/30/22 10:15 PM   Result Value Ref Range    AMPHETAMINES Negative Negative      BARBITURATES Negative Negative      BENZODIAZEPINES Negative Negative      COCAINE Negative Negative      METHADONE Negative Negative      OPIATES Negative Negative      PCP(PHENCYCLIDINE) Negative Negative      THC (TH-CANNABINOL) Negative Negative      Drug screen comment        This test is a screen for drugs of abuse in a medical setting only (i.e., they are unconfirmed results and as such must not be used for non-medical purposes, e.g.,employment testing, legal testing). Due to its inherent nature, false positive (FP) and false negative (FN) results may be obtained. Therefore, if necessary for medical care, recommend confirmation of positive findings by GC/MS. Radiologic Studies -   No orders to display     CT Results  (Last 48 hours)      None          CXR Results  (Last 48 hours)      None               If you feel that you have not received excellent quality care or timely care, please ask to speak to the nurse manager. Please choose us in the future for your continued health care needs. ------------------------------------------------------------------------------------------------------------  The exam and treatment you received in the Emergency Department were for an urgent problem and are not intended as complete care. It is important that you follow-up with a doctor, nurse practitioner, or physician assistant to:  (1) confirm your diagnosis,  (2) re-evaluation of changes in your illness and treatment, and  (3) for ongoing care.   If your symptoms become worse or you do not improve as expected and you are unable to reach your usual health care provider, you should return to the Emergency Department. We are available 24 hours a day. Please take your discharge instructions with you when you go to your follow-up appointment. If you have any problem arranging a follow-up appointment, contact the Emergency Department immediately. If a prescription has been provided, please have it filled as soon as possible to prevent a delay in treatment. Read the entire medication instruction sheet provided to you by the pharmacy. If you have any questions or reservations about taking the medication due to side effects or interactions with other medications, please call your primary care physician or contact the ER to speak with the charge nurse. Make an appointment with your family doctor or the physician you were referred to for follow-up of this visit as instructed on your discharge paperwork, as this is a mandatory follow-up. Return to the ER if you are unable to be seen or if you are unable to be seen in a timely manner. If you have any problem arranging the follow-up visit, contact the Emergency Department immediately.

## 2022-01-31 NOTE — ED NOTES
ED NOTE    ED Course as of 01/31/22 0239   Mon Jan 31, 2022   0051 ASSUMPTION OF CARE NOTE    I was given sign out on this patient from the off-going physician. All directly relevant available labs, images, and records were reviewed. The patient is currently stable. Please see the previous note for more details. Briefly, 61M awaiting placement for alcohol detox. Martin Ashton MD  12:51 AM   [YA]   0337 Updated by nursing that behavioral health evaluated patient and does not require inpatient psychiatric admission as no SI/HI. However patient still intoxicated per bloodwork. Will reassess for withdrawal sx requiring admission in AM once sober. [YA]   0236 Reassessed patient, clinically sober. Denies SI/HI/AVH. States he can call his doctor when he gets home for help withdrawing from alcohol. Has never had complications from withdrawing in past, seizures only from brain injury that he takes AEDs for. Will discharge with return precautions.  [YA]      ED Course User Index  [YA] Watson Peres MD

## 2022-02-01 ENCOUNTER — PATIENT OUTREACH (OUTPATIENT)
Dept: CASE MANAGEMENT | Age: 62
End: 2022-02-01

## 2022-03-16 ENCOUNTER — HOSPITAL ENCOUNTER (INPATIENT)
Age: 62
LOS: 5 days | Discharge: HOME | DRG: 881 | End: 2022-03-21
Attending: EMERGENCY MEDICINE | Admitting: PSYCHIATRY & NEUROLOGY
Payer: MEDICARE

## 2022-03-16 DIAGNOSIS — F19.10 SUBSTANCE ABUSE (HCC): ICD-10-CM

## 2022-03-16 DIAGNOSIS — R45.851 SUICIDAL IDEATION: Primary | ICD-10-CM

## 2022-03-16 LAB
AMPHET UR QL SCN: NEGATIVE
ANION GAP SERPL CALC-SCNC: 6 MMOL/L (ref 5–15)
APPEARANCE UR: CLEAR
BACTERIA URNS QL MICRO: NEGATIVE /HPF
BARBITURATES UR QL SCN: NEGATIVE
BASOPHILS # BLD: 0 K/UL (ref 0–0.1)
BASOPHILS NFR BLD: 0 % (ref 0–1)
BENZODIAZ UR QL: NEGATIVE
BILIRUB UR QL: NEGATIVE
BUN SERPL-MCNC: 8 MG/DL (ref 6–20)
BUN/CREAT SERPL: 8 (ref 12–20)
CA-I BLD-MCNC: 8.7 MG/DL (ref 8.5–10.1)
CANNABINOIDS UR QL SCN: NEGATIVE
CHLORIDE SERPL-SCNC: 111 MMOL/L (ref 97–108)
CO2 SERPL-SCNC: 24 MMOL/L (ref 21–32)
COCAINE UR QL SCN: POSITIVE
COLOR UR: YELLOW
CREAT SERPL-MCNC: 1 MG/DL (ref 0.7–1.3)
DIFFERENTIAL METHOD BLD: ABNORMAL
DRUG SCRN COMMENT,DRGCM: ABNORMAL
EOSINOPHIL # BLD: 0 K/UL (ref 0–0.4)
EOSINOPHIL NFR BLD: 0 % (ref 0–7)
ERYTHROCYTE [DISTWIDTH] IN BLOOD BY AUTOMATED COUNT: 14.8 % (ref 11.5–14.5)
ETHANOL SERPL-MCNC: 178 MG/DL
FLUAV RNA SPEC QL NAA+PROBE: NOT DETECTED
FLUBV RNA SPEC QL NAA+PROBE: NOT DETECTED
GLUCOSE SERPL-MCNC: 97 MG/DL (ref 65–100)
GLUCOSE UR STRIP.AUTO-MCNC: NORMAL MG/DL
HCT VFR BLD AUTO: 40.7 % (ref 36.6–50.3)
HGB BLD-MCNC: 13.1 G/DL (ref 12.1–17)
HGB UR QL STRIP: NEGATIVE
IMM GRANULOCYTES # BLD AUTO: 0 K/UL (ref 0–0.04)
IMM GRANULOCYTES NFR BLD AUTO: 0 % (ref 0–0.5)
KETONES UR QL STRIP.AUTO: NEGATIVE MG/DL
LEUKOCYTE ESTERASE UR QL STRIP.AUTO: NEGATIVE
LYMPHOCYTES # BLD: 2.3 K/UL (ref 0.8–3.5)
LYMPHOCYTES NFR BLD: 29 % (ref 12–49)
MCH RBC QN AUTO: 27.5 PG (ref 26–34)
MCHC RBC AUTO-ENTMCNC: 32.2 G/DL (ref 30–36.5)
MCV RBC AUTO: 85.5 FL (ref 80–99)
METHADONE UR QL: NEGATIVE
MONOCYTES # BLD: 0.6 K/UL (ref 0–1)
MONOCYTES NFR BLD: 8 % (ref 5–13)
NEUTS SEG # BLD: 4.8 K/UL (ref 1.8–8)
NEUTS SEG NFR BLD: 63 % (ref 32–75)
NITRITE UR QL STRIP.AUTO: NEGATIVE
NRBC # BLD: 0 K/UL (ref 0–0.01)
NRBC BLD-RTO: 0 PER 100 WBC
OPIATES UR QL: NEGATIVE
PCP UR QL: NEGATIVE
PH UR STRIP: 7 [PH] (ref 5–8)
PLATELET # BLD AUTO: 313 K/UL (ref 150–400)
PMV BLD AUTO: 8.9 FL (ref 8.9–12.9)
POTASSIUM SERPL-SCNC: 4.1 MMOL/L (ref 3.5–5.1)
PROT UR STRIP-MCNC: NEGATIVE MG/DL
RBC # BLD AUTO: 4.76 M/UL (ref 4.1–5.7)
RBC #/AREA URNS HPF: ABNORMAL /HPF (ref 0–5)
SARS-COV-2, COV2: NOT DETECTED
SODIUM SERPL-SCNC: 141 MMOL/L (ref 136–145)
SP GR UR REFRACTOMETRY: 1.01 (ref 1–1.03)
UA: UC IF INDICATED,UAUC: ABNORMAL
UROBILINOGEN UR QL STRIP.AUTO: 0.1 EU/DL (ref 0.1–1)
WBC # BLD AUTO: 7.8 K/UL (ref 4.1–11.1)
WBC URNS QL MICRO: ABNORMAL /HPF (ref 0–4)

## 2022-03-16 PROCEDURE — 80048 BASIC METABOLIC PNL TOTAL CA: CPT

## 2022-03-16 PROCEDURE — 65220000003 HC RM SEMIPRIVATE PSYCH

## 2022-03-16 PROCEDURE — 82077 ASSAY SPEC XCP UR&BREATH IA: CPT

## 2022-03-16 PROCEDURE — 87636 SARSCOV2 & INF A&B AMP PRB: CPT

## 2022-03-16 PROCEDURE — 80307 DRUG TEST PRSMV CHEM ANLYZR: CPT

## 2022-03-16 PROCEDURE — 74011250637 HC RX REV CODE- 250/637: Performed by: PSYCHIATRY & NEUROLOGY

## 2022-03-16 PROCEDURE — 81001 URINALYSIS AUTO W/SCOPE: CPT

## 2022-03-16 PROCEDURE — 85025 COMPLETE CBC W/AUTO DIFF WBC: CPT

## 2022-03-16 PROCEDURE — 36415 COLL VENOUS BLD VENIPUNCTURE: CPT

## 2022-03-16 PROCEDURE — 99285 EMERGENCY DEPT VISIT HI MDM: CPT

## 2022-03-16 RX ORDER — ATORVASTATIN CALCIUM 10 MG/1
10 TABLET, FILM COATED ORAL
Status: DISCONTINUED | OUTPATIENT
Start: 2022-03-16 | End: 2022-03-21 | Stop reason: HOSPADM

## 2022-03-16 RX ORDER — CITALOPRAM 10 MG/1
10 TABLET ORAL DAILY
Status: DISCONTINUED | OUTPATIENT
Start: 2022-03-17 | End: 2022-03-18

## 2022-03-16 RX ORDER — METOPROLOL TARTRATE 25 MG/1
50 TABLET, FILM COATED ORAL 2 TIMES DAILY
Status: DISCONTINUED | OUTPATIENT
Start: 2022-03-16 | End: 2022-03-21 | Stop reason: HOSPADM

## 2022-03-16 RX ORDER — DIVALPROEX SODIUM 500 MG/1
500 TABLET, DELAYED RELEASE ORAL 2 TIMES DAILY
Status: DISCONTINUED | OUTPATIENT
Start: 2022-03-16 | End: 2022-03-21 | Stop reason: HOSPADM

## 2022-03-16 RX ORDER — ADHESIVE BANDAGE
30 BANDAGE TOPICAL DAILY PRN
Status: DISCONTINUED | OUTPATIENT
Start: 2022-03-16 | End: 2022-03-21 | Stop reason: HOSPADM

## 2022-03-16 RX ORDER — CHLORDIAZEPOXIDE HYDROCHLORIDE 10 MG/1
10 CAPSULE, GELATIN COATED ORAL 2 TIMES DAILY
Status: DISCONTINUED | OUTPATIENT
Start: 2022-03-16 | End: 2022-03-18

## 2022-03-16 RX ORDER — IBUPROFEN 200 MG
1 TABLET ORAL DAILY
Status: DISCONTINUED | OUTPATIENT
Start: 2022-03-17 | End: 2022-03-21 | Stop reason: HOSPADM

## 2022-03-16 RX ORDER — HYDROXYZINE 50 MG/1
50 TABLET, FILM COATED ORAL
Status: DISCONTINUED | OUTPATIENT
Start: 2022-03-16 | End: 2022-03-21 | Stop reason: HOSPADM

## 2022-03-16 RX ORDER — ACETAMINOPHEN 325 MG/1
650 TABLET ORAL
Status: DISCONTINUED | OUTPATIENT
Start: 2022-03-16 | End: 2022-03-21 | Stop reason: HOSPADM

## 2022-03-16 RX ORDER — TRAZODONE HYDROCHLORIDE 50 MG/1
50 TABLET ORAL
Status: DISCONTINUED | OUTPATIENT
Start: 2022-03-16 | End: 2022-03-21 | Stop reason: HOSPADM

## 2022-03-16 RX ADMIN — ATORVASTATIN CALCIUM 10 MG: 10 TABLET, FILM COATED ORAL at 21:04

## 2022-03-16 RX ADMIN — METOPROLOL TARTRATE 50 MG: 25 TABLET, FILM COATED ORAL at 17:09

## 2022-03-16 RX ADMIN — DIVALPROEX SODIUM 500 MG: 500 TABLET, DELAYED RELEASE ORAL at 21:04

## 2022-03-16 RX ADMIN — CHLORDIAZEPOXIDE HYDROCHLORIDE 10 MG: 10 CAPSULE ORAL at 21:04

## 2022-03-16 RX ADMIN — METOPROLOL TARTRATE 50 MG: 25 TABLET, FILM COATED ORAL at 21:04

## 2022-03-16 RX ADMIN — DIVALPROEX SODIUM 500 MG: 500 TABLET, DELAYED RELEASE ORAL at 17:09
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
## 2022-03-16 NOTE — PROGRESS NOTES
Spiritual Care Assessment/Progress Note  [facility]      NAME: Zari Graham      MRN: [id number]  AGE: 65 y.o.  SEX: male  Oriental orthodox Affiliation: Caodaism   Language: English     3/16/2022     Total Time (in minutes): 21     Spiritual Assessment begun in [facility] 2  NON ACUTE through conversation with:         [x]Patient        [] Family    [] Friend(s)        Reason for Consult: Initial/Spiritual assessment, patient floor     Spiritual beliefs: (Please include comment if needed)     [x] Identifies with a cullen tradition:  Caodaism       [] Supported by a cullen community:            [] Claims no spiritual orientation:           [] Seeking spiritual identity:                [] Adheres to an individual form of spirituality:           [] Not able to assess:                           Identified resources for coping:      [] Prayer                               [] Music                  [] Guided Imagery     [] Family/friends                 [] Pet visits     [] Devotional reading                         [] Unknown     [] Other:                                           Interventions offered during this visit: (See comments for more details)    Patient Interventions: Affirmation of emotions/emotional suffering,Affirmation of cullen,Catharsis/review of pertinent events in supportive environment,Coping skills reviewed/reinforced,Iconic (affirming the presence of God/Higher Power)           Plan of Care:     [] Support spiritual and/or cultural needs    [] Support AMD and/or advance care planning process      [] Support grieving process   [] Coordinate Rites and/or Rituals    [] Coordination with community clergy   [] No spiritual needs identified at this time   [] Detailed Plan of Care below (See Comments)  [] Make referral to Music Therapy  [] Make referral to Pet Therapy     [] Make referral to Addiction services  [] Make referral to [facility]  [] Make referral to Spiritual Care Partner  [] No future visits requested        [x] Contact Spiritual Care for further referrals     Comments:  visit for initial spiritual assessment. Met patient in his room. Provided spiritual presence and listening. Patient mainly spoke of his cullen and cullen journey telling the story of a near-death experience he had many years ago where he had momentarily  and had seen a vision of Jalen prior to his return and recovery. Says this experience has changed his life and provides the assurance and comfort when things get tough. Difficult to understand at times. Says he only wants to watch a little television in the dayroom until dinner arrives as he is hungry. Said he felt comforted and encouraged as a result of this visit and expressed gratitude inviting the  to return any time. Rev.  Valerie Watson MDiv, [facility], [address]   paging service: [phone] ()

## 2022-03-16 NOTE — ED NOTES
TRANSFER - OUT REPORT:    Verbal report given to Mariposa Villareal RN (name) on Barbra Parsons  being transferred to [address] [place] [address] (unit) for routine progression of care       Report consisted of patients Situation, Background, Assessment and   Recommendations(SBAR). Information from the following report(s) SBAR, Kardex and ED Summary was reviewed with the receiving nurse. Lines:       Opportunity for questions and clarification was provided.       Patient transported with:   [employer]

## 2022-03-16 NOTE — BSMART NOTE
Pt arrived at ED via ambulance and assessed in ED 25    Pt presented with SI w/plan     Pt presented with disheveled appearance. Pt thought process disorganized    Pt cognition following commands  no issues following commands    Pt reports has been hospitalized many   times     Most Recent Hospitalizations if any: 10/21/21    Pt reports no counselor or [facility] [address] provider    Pt does have a hx of legal issues. Pt does not have hx of violence/aggression     Pt reports  Cocaine use, ETOH    Pt UDS positive for: Cocaine, ETOH    Hx. Of Substance Treatment: NO  When: Not Applicable  Where: Not Applicable    Highest Level of Education: 12th grade      Employment: NO    Source of Income: disability    Housing: Independent Housing    Access to Weapons: YES    If weapons, Have they been removed: NO    Decision Making:    Does Patient have a guardian/POA: NO    If so, Name of Guardian/POA: N/A    Contact Information: N/A    Was Paperwork Provided?: N/A    If not, Was it Requested:N/A                                                                    Who to Follow Up With for Paperwork:N/A    Was Information Emailed to Director and Supervisor: N/A    Trauma Hx:   Sexual: NO  When:  Not Applicable By Whom:Not Applicable    Physical: NO  When: Not Applicable By Whom:Not Applicable    Verbal: NO  When: Not Applicable By Whom:Not Applicable      Family Support: YES    Who: sister Cassandra Salinas      Dr. Destiny Nash contacted and reports pt meets inpatient level of care and will be admitted to [address] [place] [address] pending medical clearance      This writer notified assigned PRINCE [place] and assigned physician Dr Nicci Melvin. Safety Plan Completed: N/A        PATIENT NARRATIVE SUMMARY:pt seen and assessed in ER 25. Pt dressed in green gown and appears stated age. Pt present with SI with plan to cut his wrists. He states he has recently relapsed on crack cocaine and ETOH (level in ).   Reports he also had HI last night towards \"the man that sent the package,\" however he could not elaborate on what this meant. Pt very talkative and repeatedly discussing calling the government to look at his sealed records. Pt stated he is scared to go home because he 'really will hurt himself.'      This writer will follow up as needed.

## 2022-03-16 NOTE — ED TRIAGE NOTES
Pt states he relapsed on drugs and alcohol and is suicidal with a plan to cut his wrist. Pt states the last he used cocaine and alcohol was tonight.

## 2022-03-16 NOTE — ED NOTES
Constant Observer Yes - Name: Juanito Alexandre Observer Oriented YES   High risk patients are in line of sight at all times Yes   Excess equipment/medical supplies not necessary for the care of the patient removed Yes   All sharp or dangerous objects are removed from room: including but not limited to belts, pens & pencils, needles, medications, cosmetics, lighters, matches, nail files, watches, necklaces, glass objects, razors, razor blades, knives, aerosol sprays, drawstring pants, shoes, cords (telephone, call bells, etc.) cleaning wipes or other cleaning items, aluminum cans, not permanently attached wall décor Yes   Telephone/cell phone removed as well as TV remote (batteries can be swallowed) Yes   Patient belongings removed and labeled at nurses station Yes   Excess linen is removed from room Yes   All plastic bags are removed from the room and replaced with paper trash bags Yes   Patient is in paper scrubs or appropriate gown and using hospital socks with rubber soles Yes   No metal, hard eating utensils or hard plates are on meal tray Yes   Remove all cleaning agents used by [employer] Yes   If Crucifix is hanging on a nail, remove Crucifix as well as the nail Yes       *If any question above is answered \"No,\" documentation is required.

## 2022-03-16 NOTE — ED NOTES
Pt in bed resting with eyes open, pt denies any SI/HI at this time, pt denies any pain.  Pt in no acute distress, 1:1 constant support at bedside, writer will continue to monitor

## 2022-03-16 NOTE — ED NOTES
Assumed care of pt, bedside shift report received from Joseph, [address]

## 2022-03-16 NOTE — ED PROVIDER NOTES
EMERGENCY DEPARTMENT HISTORY AND PHYSICAL EXAM      Date: 3/16/2022  Patient Name: Tommy Powell    History of Presenting Illness     Chief Complaint   Patient presents with    Suicidal       History Provided By: Patient    HPI: Tommy Powell, 65 y.o. male with a past medical history significant Substance abuse and depression presents to the ED with cc of SI with plan to cut wrists. Patient reports having any somatic complaints at this time. There are no other complaints, changes, or physical findings at this time. PCP: Kirsty Reynolds NP    No current facility-administered medications on file prior to encounter. Current Outpatient Medications on File Prior to Encounter   Medication Sig Dispense Refill    citalopram (CELEXA) 10 mg tablet Take 1 Tablet by mouth daily. 30 Tablet 0    divalproex DR (DEPAKOTE) 500 mg tablet Take 1 Tablet by mouth two (2) times a day. 6 Tablet 0    atorvastatin (LIPITOR) 10 mg tablet Take 1 Tablet by mouth nightly. 30 Tablet 0    metoprolol tartrate (LOPRESSOR) 50 mg tablet Take 1 Tablet by mouth two (2) times a day. 30 Tablet 0    naproxen (NAPROSYN) 500 mg tablet Take 1 Tablet by mouth two (2) times daily (with meals). 30 Tablet 0       Past History     Past Medical History:  Past Medical History:   Diagnosis Date    Hypercholesterolemia     Hypertension     Psychiatric disorder     [facility]     Sub[facility]     Suicidal thoughts     TBI (traumatic brain injury) ([facility]        Past Surgical History:  Past Surgical History:   Procedure Laterality Date    HX NEPHROSTOMY         Family History:  History reviewed. No pertinent family history. Social History:  Social History     Tobacco Use    Smoking status: Never Smoker    Smokeless tobacco: Current User   Vaping Use    Vaping Use: Never used   Substance Use Topics    Alcohol use:  Yes     Alcohol/week: 6.0 standard drinks     Types: 6 Cans of beer per week     Comment: 40oz/day    Drug use: Yes     Types: Cocaine     Comment: Last used today       Allergies:  No Known Allergies      Review of Systems   Review of Systems   Constitutional: Negative for chills and fever. HENT: Negative for sinus pressure and sinus pain. Eyes: Negative for photophobia and redness. Respiratory: Negative for shortness of breath and wheezing. Cardiovascular: Negative for chest pain and palpitations. Gastrointestinal: Negative for abdominal pain and nausea. Genitourinary: Negative for flank pain and hematuria. Musculoskeletal: Negative for arthralgias and gait problem. Skin: Negative for color change and pallor. Neurological: Negative for dizziness and weakness. Review of Systems    Physical Exam   Physical Exam  Constitutional:       General: No acute distress. Appearance: Normal appearance. Not toxic-appearing. HENT:      Head: Normocephalic and atraumatic. Nose: Nose normal.      Mouth/Throat:      Mouth: Mucous membranes are moist.   Eyes:      Extraocular Movements: Extraocular movements intact. Pupils: Pupils are equal, round, and reactive to light. Cardiovascular:      Rate and Rhythm: Normal rate. Pulses: Normal pulses. Pulmonary:      Effort: Pulmonary effort is normal.      Breath sounds: No stridor. Abdominal:      General: Abdomen is flat. There is no distension. Musculoskeletal:         General: Normal range of motion. Cervical back: Normal range of motion and neck supple. Skin:     General: Skin is warm and dry. Capillary Refill: Capillary refill takes less than 2 seconds. Neurological:      General: No focal deficit present. Mental Status: Alert and oriented to person, place, and time. Psychiatric:         Mood and Affect: Depressed mood, flat affect.          Behavior: Behavior normal.       Physical Exam    Lab and Diagnostic Study Results     Labs -   No results found for this or any previous visit (from the past 12 hour(s)). Radiologic Studies -   @lastxrresult@  CT Results  (Last 48 hours)    None        CXR Results  (Last 48 hours)    None            Medical Decision Making   - I am the first provider for this patient. - I reviewed the vital signs, available nursing notes, past medical history, past surgical history, family history and social history. - Initial assessment performed. The patients presenting problems have been discussed, and they are in agreement with the care plan formulated and outlined with them. I have encouraged them to ask questions as they arise throughout their visit. Vital Signs-Reviewed the patient's vital signs. Patient Vitals for the past 12 hrs:   Temp Pulse Resp BP SpO2   03/16/22 0405 98.3 °F (36.8 °C) 92 16 (!) 145/92 96 %       Records Reviewed: Old Medical Records      Disposition   Disposition: Admitted to [address] [place] [address] at [facility] the case was discussed with the admitting physician         Diagnosis     Clinical Impression:   1. Suicidal ideation    2. Substance abuse [facility])        Attestations:    Ekaterina Resendiz MD    Please note that this dictation was completed with [employer], the computer voice recognition software. Quite often unanticipated grammatical, syntax, homophones, and other interpretive errors are inadvertently transcribed by the computer software. Please disregard these errors. Please excuse any errors that have escaped final proofreading. Thank you.

## 2022-03-17 PROCEDURE — 65220000003 HC RM SEMIPRIVATE PSYCH

## 2022-03-17 PROCEDURE — 74011250637 HC RX REV CODE- 250/637: Performed by: PSYCHIATRY & NEUROLOGY

## 2022-03-17 RX ADMIN — METOPROLOL TARTRATE 50 MG: 25 TABLET, FILM COATED ORAL at 21:10

## 2022-03-17 RX ADMIN — DIVALPROEX SODIUM 500 MG: 500 TABLET, DELAYED RELEASE ORAL at 08:47

## 2022-03-17 RX ADMIN — CHLORDIAZEPOXIDE HYDROCHLORIDE 10 MG: 10 CAPSULE ORAL at 08:47

## 2022-03-17 RX ADMIN — DIVALPROEX SODIUM 500 MG: 500 TABLET, DELAYED RELEASE ORAL at 21:11

## 2022-03-17 RX ADMIN — CITALOPRAM HYDROBROMIDE 10 MG: 10 TABLET ORAL at 08:47

## 2022-03-17 RX ADMIN — CHLORDIAZEPOXIDE HYDROCHLORIDE 10 MG: 10 CAPSULE ORAL at 21:10

## 2022-03-17 RX ADMIN — ATORVASTATIN CALCIUM 10 MG: 10 TABLET, FILM COATED ORAL at 21:10

## 2022-03-17 RX ADMIN — METOPROLOL TARTRATE 50 MG: 25 TABLET, FILM COATED ORAL at 08:47

## 2022-03-17 NOTE — BH NOTES
PSA PART II ADDITIONAL INFORMATION        Access To Fire Arms: No    Substance Use: YES    Last Use: Pt reports \"a few days ago. }    Type of Substance: Alcohol use and  Cocaine use    Frequency of Use: Pt reports, \"weekly\"    Request to See : YES    If yes, notified : YES    Guardian:NO    Guardian Contact: No    Release of Information Signed: YES    Release of Information Signed For: sister Moore, [phone]    Patient signed treatment plan. Writer attempted contact with sister Moore. Several wrong numbers. Writer will attempt to get a different phone number. Writer was able to find the correct number for Eran Fadia, [email or web address] attempted contact but was unable to leave a message.

## 2022-03-17 NOTE — BH NOTES
Pt. Rested well on this shift, no concerns expressed. Denied depression, anxiety, SI/HI, hallucinations or pain. Medication compliant. Pt. In no distress respirations regular and unlabored. Will continue to monitor Q15 mins per unit protocol.

## 2022-03-17 NOTE — GROUP NOTE
IP  GROUP DOCUMENTATION INDIVIDUAL                                                                          Group Therapy Note    Date: 3/17/2022    Group Start Time: 1115  Group End Time: 1200  Group Topic: Education Group - Inpatient    [facility]    Belva Apgar    IP [facility] GROUP DOCUMENTATION GROUP    Group Therapy Note  The therapist facilitated a group using worksheets on identifying feelings, and things that trigger those feelings. Attendees: 5         Attendance: Attended    Patient's Goal:  To attend groups and activities     Interventions/techniques: Reinforced and Supported    Follows Directions: Followed directions    Interactions: Interacted appropriately    Mental Status: Calm    Behavior/appearance: Attentive and Cooperative    Goals Achieved: Able to engage in interactions, Able to listen to others, Able to reflect/comment on own behavior and Able to self-disclose      Additional Notes: The pt shared a lot in group, and required redirection at times due to talking over his peers. He shared about his substance use, stating that he normally drinks a case of beer, or a bottle of liquor per day.      Raoul Nicholson
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
## 2022-03-17 NOTE — BH NOTES
DAYSHIFT NOTE:     Patient is up early this morning and watching TV in the dayroom. Patient has a bright affect and is very socialable and talkative with staff. Patient smiles and jokes. Patient has poor insight and states how he wants to go outside and smoke a cigarette. Patient denies having any depression and or anxiety. Denies SI/HI. Denies AH/VH. Denies having any withdrawal symptoms. Patient had his blood pressure re-checked after eating breakfast and was better with /82, heart rate 77. Patient is up for his meals and snacks. Patient interactive with peers on the unit. Close observations continued to ensure patient safety.
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
## 2022-03-17 NOTE — BH NOTES
PSYCHOSOCIAL ASSESSMENT  :Patient identifying info:   Link Lover is a 65 y.o., male admitted 3/16/2022  3:49 AM     Presenting problem and precipitating factors: Pt was admitted due to a suicidal ideation with a plan. Pt stated during PSA that he admitted himself for some help and that he relapsed on alcohol and cocaine. Mental status assessment: Pt was oriented x4 and hyperverbal. Pt presented with a disorganized thought process regarding a man with a package that pt believes was going to kill him. Pt endorses SI but without a plan. Pt denies HI. Pt denies auditory hallucinations but reports that he \"sees Jalen\" and that he sees deaf people. Pt endorses [facility]. Strengths/Recreation/Coping Skills: Pt reports \"staying active\" as a strength. Pt reports \"being on the computer\" as a recreation he enjoys and \"cooking\" as a coping skill. Collateral information: Pt signed release for his sister, Adam Kruger. Current psychiatric /substance abuse providers and contact info: None reported. Previous psychiatric/substance abuse providers and response to treatment: Pt reports that he used to be seen at [place]. Pt reports that he has been hospitalized multiple times. Family history of mental illness or substance abuse: None reported. Substance abuse history:    Social History     Tobacco Use    Smoking status: Never Smoker    Smokeless tobacco: Current User   Substance Use Topics    Alcohol use: Yes     Alcohol/week: 6.0 standard drinks     Types: 6 Cans of beer per week     Comment: 40oz/day       History of biomedical complications associated with substance abuse: None reported. Patient's current acceptance of treatment or motivation for change: Pt reports that he wants to get help with substance use but does not want inpatient. Pt reports that he wants to attend groups and get information. Family constellation: Pt reports that he has several family members in the area.     Is significant other involved? None reported. Describe support system: Pt identifies his sister as supportive. Describe living arrangements and home environment: Pt reports that he lives with his sister. GUARDIAN/POA: NO    Guardian Name: N/A    Guardian Contact: /NA    Health issues:   Hospital Problems  Date Reviewed: 2021          Codes Class Noted POA    Substance abuse ([facility] [address] [address] ICD-10-CM: F19.10  ICD-9-CM: 305.90  3/16/2022 Unknown        Suicidal ideations ICD-10-CM: R45.851  ICD-9-CM: V62.84  3/16/2022 Unknown        Major depression ICD-10-CM: F32.9  ICD-9-CM: 296.20  10/22/2021 Unknown              Trauma history: Pt reports that he experienced the death of several family members. Legal issues: Pt denies any hx of legal issues. History of  service: None reported. Financial status: Pt reports that he receives disability. Baptism/cultural factors: None reported. Education/work history: Pt reports 12th grade. Pt reports that he has a history of working with [employer]. Have you been licensed as a health care professional (current or ): No    Leisure and recreation preferences: Pt reports \"staying active. \"    Describe coping skills: Pt reports \"cooking. \"    Rush Code  3/17/2022

## 2022-03-17 NOTE — GROUP NOTE
IP  GROUP DOCUMENTATION INDIVIDUAL                                                                          Group Therapy Note    Date: 3/17/2022    Group Start Time: 3902  Group End Time: 1400  Group Topic: Process Group - Inpatient    [facility]    Gabrielle Simmonds    IP [address] [facility] GROUP DOCUMENTATION GROUP    Group Therapy Note:  Facilitator encouraged the group to acknowledge the main issues that led to hospitalization. Each group member shared experiences and offered insight and support to one another. Attendees: 5         Attendance: Attended    Patient's Goal:  To attend groups    Interventions/techniques: Informed, Validated and Supported    Follows Directions:  Followed directions    Interactions: Interacted appropriately    Mental Status: Calm, Congruent and Restricted    Behavior/appearance: Attentive, Cooperative and Motivated    Goals Achieved: Able to engage in interactions, Able to listen to others, Able to give feedback to another, Able to reflect/comment on own behavior and Verbalized increased hopefulness      Additional Notes:  Pt offered support and openly processed     Lodema Face

## 2022-03-18 PROBLEM — F32.9 MAJOR DEPRESSION: Status: ACTIVE | Noted: 2021-10-22

## 2022-03-18 PROCEDURE — 74011250637 HC RX REV CODE- 250/637: Performed by: PSYCHIATRY & NEUROLOGY

## 2022-03-18 PROCEDURE — 65220000003 HC RM SEMIPRIVATE PSYCH

## 2022-03-18 RX ORDER — RISPERIDONE 1 MG/1
1 TABLET, FILM COATED ORAL 2 TIMES DAILY
Status: DISCONTINUED | OUTPATIENT
Start: 2022-03-18 | End: 2022-03-19

## 2022-03-18 RX ORDER — CITALOPRAM 20 MG/1
20 TABLET, FILM COATED ORAL DAILY
Status: DISCONTINUED | OUTPATIENT
Start: 2022-03-19 | End: 2022-03-21 | Stop reason: HOSPADM

## 2022-03-18 RX ADMIN — ATORVASTATIN CALCIUM 10 MG: 10 TABLET, FILM COATED ORAL at 21:23

## 2022-03-18 RX ADMIN — CITALOPRAM HYDROBROMIDE 10 MG: 10 TABLET ORAL at 09:06

## 2022-03-18 RX ADMIN — METOPROLOL TARTRATE 50 MG: 25 TABLET, FILM COATED ORAL at 09:06

## 2022-03-18 RX ADMIN — RISPERIDONE 1 MG: 1 TABLET ORAL at 10:05

## 2022-03-18 RX ADMIN — DIVALPROEX SODIUM 500 MG: 500 TABLET, DELAYED RELEASE ORAL at 09:06

## 2022-03-18 RX ADMIN — METOPROLOL TARTRATE 50 MG: 25 TABLET, FILM COATED ORAL at 21:23

## 2022-03-18 RX ADMIN — DIVALPROEX SODIUM 500 MG: 500 TABLET, DELAYED RELEASE ORAL at 21:24

## 2022-03-18 RX ADMIN — CHLORDIAZEPOXIDE HYDROCHLORIDE 10 MG: 10 CAPSULE ORAL at 09:06

## 2022-03-18 RX ADMIN — RISPERIDONE 1 MG: 1 TABLET ORAL at 21:23

## 2022-03-18 NOTE — PROGRESS NOTES
Problem: Suicide  Goal: *STG: Remains safe in hospital  Outcome: Progressing Towards Goal   Patient to remain safe while in the hospital.

## 2022-03-18 NOTE — PROGRESS NOTES
Progress Note  Date:3/18/2022       Room:[facility]  Patient Name:Félix Fletcher     YOB: 1960     Age:62 y.o. Subjective    Subjective   Patient reports he has been feeling better with his auditory hallucinations and visual hallucinations. Still hearing voices. He is not having any homicidal thoughts. Mental status examination-patient is alert oriented to name place person. Cognitively presents limited patient denies any command hallucinations. He still hears voices. Denies any active suicidal homicidal feelings. Insight judgment coping remains limited. Review of Systems  Objective         Vitals Last 24 Hours:  TEMPERATURE:  Temp  Av °F (36.7 °C)  Min: 97.7 °F (36.5 °C)  Max: 98.3 °F (36.8 °C)  RESPIRATIONS RANGE: Resp  Av  Min: 16  Max: 18  PULSE OXIMETRY RANGE: SpO2  Av %  Min: 98 %  Max: 98 %  PULSE RANGE: Pulse  Av.7  Min: 60  Max: 73  BLOOD PRESSURE RANGE: Systolic (44HHX), SBT:386 , Min:105 , BBN:979   ; Diastolic (19MBF), MPW:86, Min:65, Max:90    I/O (24Hr): No intake or output data in the 24 hours ending 22 1515  Objective  Labs/Imaging/Diagnostics    Labs:  CBC:Recent Labs     22  0402   WBC 7.8   RBC 4.76   HGB 13.1   HCT 40.7   MCV 85.5   RDW 14.8*        CHEMISTRIES:  Recent Labs     22  0402      K 4.1   *   CO2 24   BUN 8   CA 8.7   PT/INR:No results for input(s): INR, INREXT in the last 72 hours. No lab exists for component: PROTIME  APTT:No results for input(s): APTT in the last 72 hours. LIVER PROFILE:No results for input(s): AST, ALT in the last 72 hours. No lab exists for component: MARIBEL VenturaPHOS  Lab Results   Component Value Date/Time    ALT (SGPT) 24 2022 10:05 PM    AST (SGOT) 17 2022 10:05 PM    Alk. phosphatase 59 2022 10:05 PM    Bilirubin, total 0.2 2022 10:05 PM       Imaging Last 24 Hours:  No results found.   Assessment//Plan Active Problems:    Major depression (10/22/2021)      Substance abuse ([address] (3/16/2022)      Suicidal ideations (3/16/2022)      Assessment & Plan  Started on Risperdal 1 mg p.o. twice daily, no side effects noted.   Encourage group therapy  Family meeting contact   He states he has been speaking with his sister and collateral obtained from sister  Discharge planning in 3 to 4 days      Current Facility-Administered Medications:     risperiDONE (RisperDAL) tablet 1 mg, 1 mg, Oral, BID, Cheryl Garcia MD, 1 mg at 03/18/22 1005    [START ON 3/19/2022] citalopram (CELEXA) tablet 20 mg, 20 mg, Oral, DAILY, Cheryl Garcia MD    hydrOXYzine HCL (ATARAX) tablet 50 mg, 50 mg, Oral, TID PRN, Precious Goodman MD    traZODone (DESYREL) tablet 50 mg, 50 mg, Oral, QHS PRN, Precious Goodman MD    acetaminophen (TYLENOL) tablet 650 mg, 650 mg, Oral, Q4H PRN, Precious Goodman MD    magnesium hydroxide (MILK OF MAGNESIA) 400 mg/5 mL oral suspension 30 mL, 30 mL, Oral, DAILY PRN, Cheryl Garcia MD    nicotine (NICODERM CQ) 14 mg/24 hr patch 1 Patch, 1 Patch, TransDERmal, DAILY, Cheryl Garcia MD, 1 Patch at 03/18/22 0906    metoprolol tartrate (LOPRESSOR) tablet 50 mg, 50 mg, Oral, BID, Cheryl Garcia MD, 50 mg at 03/18/22 0906    atorvastatin (LIPITOR) tablet 10 mg, 10 mg, Oral, QHS, Cheryl Garcia MD, 10 mg at 03/17/22 2110    divalproex DR (DEPAKOTE) tablet 500 mg, 500 mg, Oral, BID, Precious Goodman MD, 500 mg at 03/18/22 0663  Electronically signed by Yanna Ascencio MD on 3/18/2022 at 3:15 PM

## 2022-03-18 NOTE — BH NOTES
DAYSHIFT NOTE:     Patient is up early this morning and sitting in the dayroom watching TV. Patient has a bright affect and is smiling and social able with peers and staff. Patient denies having any depression and or anxiety. Denies SI/HI. Patient agreed that he still had voices that come and go, but stated, \"they don't bother me, I don't pay attention to them. \" Patient is medication compliant and new orders received for risperidal this morning and patient received his first dose. Librium was discontinued. Patient has attended groups today. Patient is up for his meals and snacks. Patient jokes around and tries to be helpful with staff especially during snack times and meals. Patient can sometimes be loud with his talking. Close observations continued to ensure patient safety.

## 2022-03-18 NOTE — GROUP NOTE
KEANU  GROUP DOCUMENTATION INDIVIDUAL                                                                          Group Therapy Note    Date: 3/18/2022    Group Start Time: 1173  Group End Time: 1400  Group Topic: Process Group - Inpatient    SRM 2 [facility] ACUTE    Irasema Gonsales    IP [address] [facility] GROUP DOCUMENTATION GROUP    Group Therapy Note    Attendees: 6/12    Process: pts were asked to pick a date for writer to read according quote as a check in question. Pts then discussed substance use, trauma and how they can work on being their best selves. Pts were encouraged to provide positive feedback to one another and then reflect on group. Attendance: Attended    Patient's Goal:  Attend activities and groups    Interventions/techniques: Validated, Promoted peer support, Provide feedback and Supported    Follows Directions: Followed directions    Interactions: Interacted appropriately    Mental Status: Calm, Congruent and Flat    Behavior/appearance: Attentive, Motivated, Neatly groomed and Withdrawn/quiet    Goals Achieved: Able to engage in interactions, Able to listen to others, Able to reflect/comment on own behavior and Able to manage/cope with feelings      Additional Notes:  Pt listened but was quiet throughout group.  Pt is making progress towards goals by attending and participating in group    Yadira Cisneros
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
## 2022-03-18 NOTE — BH NOTES
Behavioral Health Treatment Team Note     Patient goal(s) for today: Pt reports \"Go to my meetings. \"  Treatment team focus/goals: Medication management, attend groups, safe discharge planning    Progress note: Pt was observed in the day room. Pt was pleasant and cooperative upon approach and appeared eager to meet with writer. Pt displayed a bright affect. Pt denies any SI, HI, or AVH's. Pt reports that he does not believe he ever was hallucinating but agreed to medication management for a safe discharge plan. Pt continues to report that he is open to IOP groups and states that he has been getting along with peers on the unit. An inpatient level of care is necessary in order to further stabilize symptoms and to establish a safe discharge plan. LOS:  2  Expected LOS: 5-7 days    Insurance info/prescription coverage:  [employer]  Date of last family contact:  Attempted to contact sister on 03/18/2022, unable to leave a voice message due to mailbox being full  Family requesting physician contact today:  no  Discharge plan: To be established prior to discharge  Guns in the home:  no   Outpatient provider(s):  To be coordinated prior to discharge    Participating treatment team members: Toby Ambriz, * (assigned SW), Marycarmen Kenyon, [facility]

## 2022-03-18 NOTE — BH NOTES
Patient up in the mileau at the beginning of the shift. Attended PSR. Anxiety-3/10. Depression-0/10. Denies SI,HI. Admits to having AH's. Denies command hallucinations. \"The voices just tell me how bad I am\". Encouraged to patient to keep thinking good things about himself and not to listen to the voices. \"I don't\". States he attends all PSR. Mood-happy and smiling. Affect-bright. Med-diet compliant. Enjoys walking on the unit,watching TV,socializing with staff and unit peers. Denies pain. Comfortable. Appearance-disheveled.

## 2022-03-19 PROCEDURE — 74011250637 HC RX REV CODE- 250/637: Performed by: PSYCHIATRY & NEUROLOGY

## 2022-03-19 PROCEDURE — 65220000003 HC RM SEMIPRIVATE PSYCH

## 2022-03-19 RX ORDER — RISPERIDONE 2 MG/1
2 TABLET, FILM COATED ORAL 2 TIMES DAILY
Status: DISCONTINUED | OUTPATIENT
Start: 2022-03-19 | End: 2022-03-21 | Stop reason: HOSPADM

## 2022-03-19 RX ADMIN — METOPROLOL TARTRATE 50 MG: 25 TABLET, FILM COATED ORAL at 20:52

## 2022-03-19 RX ADMIN — DIVALPROEX SODIUM 500 MG: 500 TABLET, DELAYED RELEASE ORAL at 20:52

## 2022-03-19 RX ADMIN — METOPROLOL TARTRATE 50 MG: 25 TABLET, FILM COATED ORAL at 09:25

## 2022-03-19 RX ADMIN — RISPERIDONE 2 MG: 2 TABLET ORAL at 20:52

## 2022-03-19 RX ADMIN — DIVALPROEX SODIUM 500 MG: 500 TABLET, DELAYED RELEASE ORAL at 09:24

## 2022-03-19 RX ADMIN — CITALOPRAM HYDROBROMIDE 20 MG: 20 TABLET ORAL at 09:24

## 2022-03-19 RX ADMIN — RISPERIDONE 1 MG: 1 TABLET ORAL at 09:24

## 2022-03-19 RX ADMIN — ATORVASTATIN CALCIUM 10 MG: 10 TABLET, FILM COATED ORAL at 21:22

## 2022-03-19 NOTE — BH NOTES
Patient up in the mileau at the beginning of the shift. Attended evening PSR along with his other unit peers Denies SI,HI. Has on-going AH's but nothing of command. Mood-happy Affect-flat. Med-diet compliant. Participates in snack time. Attends all PSR. \"I like going to all of my groups\". Enjoys walking around on the unit,engaging in dayroom activities and socializing. Denies pain. Comfortable. Somewhat disheveled. Toiletries at bedside for future hygiene.

## 2022-03-19 NOTE — PROGRESS NOTES
Problem: Paranoid Ideation  Goal: *STG: Verbalize trust of significant other & feel relaxed when not in his/her presence  Outcome: Progressing Towards Goal   Encourage patient to believe in the good qualities of himself and not focus on the short comings of his past.

## 2022-03-19 NOTE — BH NOTES
Client is pleasant and cooperative. Alert and oriented x 3. Appearance is semi-tidy. He attends scheduled groups and unit activities. He has a good appetite and reports sleeping well. Verbalizes that his room is a little chilly. Denies feeling suicidal or homicidal.Takes meds as prescribed and denies any side effects. He is playful at times. Remains on close observation for safety.

## 2022-03-19 NOTE — GROUP NOTE
IP  GROUP DOCUMENTATION INDIVIDUAL                                                                          Group Therapy Note    Date: 3/19/2022    Group Start Time: 9926  Group End Time: 1400  Group Topic: Recreational/Music Therapy    SRM 2  NON ACUTE    Dylan Gordon    IP [address] [facility] [address] GROUP DOCUMENTATION GROUP    Group Therapy Note    Attendees: 6/13  Facilitated structured group to introduce healthy leisure task skill as positive way to cope and manage stress. Attendance: Attended    Patient's Goal:  STG:Report interacting socially without fear or suspicion      Interventions/techniques: Art integration and Supported    Follows Directions: Followed directions    Interactions: Interacted appropriately    Mental Status: Calm and Flat    Behavior/appearance: Attentive and Cooperative    Goals Achieved: Able to engage in interactions and Able to listen to others      Additional Notes: Attended group and actively participated. Offered leisure packet. Declined packet but listened to music with peers. Able to select songs in group. Was receptive to intervention and used time constructively.     Elida Yepez, CTRS

## 2022-03-19 NOTE — PROGRESS NOTES
Progress Note  Date:3/19/2022       Room:[facility]  Patient Name:Félix Fletcher     YOB: 1960     Age:62 y.o. Subjective    Subjective   Patient reports he is doing well. His appetite is good. He is taking his medications without any issues. He denies any AH, VH, SI or HI. He reports that he will be going back to his sister's house when he is discharged. Review of Systems  Objective         Vitals Last 24 Hours:  TEMPERATURE:  Temp  Av.4 °F (36.9 °C)  Min: 98.2 °F (36.8 °C)  Max: 98.6 °F (37 °C)  RESPIRATIONS RANGE: Resp  Av  Min: 18  Max: 18  PULSE OXIMETRY RANGE: No data recorded  PULSE RANGE: Pulse  Av.5  Min: 67  Max: 72  BLOOD PRESSURE RANGE: Systolic (35BCQ), OYW:154 , Min:104 , VOQ:132   ; Diastolic (52TNF), PAQ:97, Min:66, Max:72    I/O (24Hr): No intake or output data in the 24 hours ending 22 1103  Objective  Labs/Imaging/Diagnostics    Labs:  CBC:No results for input(s): WBC, RBC, HGB, HCT, MCV, RDW, PLT, HGBEXT, HCTEXT, PLTEXT in the last 72 hours. CHEMISTRIES:No results for input(s): NA, K, CL, CO2, BUN, CA, PHOS, MG in the last 72 hours. No lab exists for component: CREATININE, GLUCOSEPT/INR:No results for input(s): INR, INREXT in the last 72 hours. No lab exists for component: PROTIME  APTT:No results for input(s): APTT in the last 72 hours. LIVER PROFILE:No results for input(s): AST, ALT in the last 72 hours. No lab exists for component: Dannial Dance, ALKPHOS  Lab Results   Component Value Date/Time    ALT (SGPT) 24 2022 10:05 PM    AST (SGOT) 17 2022 10:05 PM    Alk. phosphatase 59 2022 10:05 PM    Bilirubin, total 0.2 2022 10:05 PM       Imaging Last 24 Hours:  No results found.   Assessment//Plan   Active Problems:    Major depression (10/22/2021)      Substance abuse ([facility] [address] (3/16/2022)      Suicidal ideations (3/16/2022)      Assessment & Plan  Increase Risperdal to 2 mg p.o. twice daily  Continue group therapy  Discharge planning for Monday or Tuesday      Current Facility-Administered Medications:     risperiDONE (RisperDAL) tablet 2 mg, 2 mg, Oral, BID, Cheryl Garcia MD    citalopram (CELEXA) tablet 20 mg, 20 mg, Oral, DAILY, Cheryl Garcia MD, 20 mg at 03/19/22 9756    hydrOXYzine HCL (ATARAX) tablet 50 mg, 50 mg, Oral, TID PRN, Jeff Perez MD    traZODone (DESYREL) tablet 50 mg, 50 mg, Oral, QHS PRN, Jeff Perez MD    acetaminophen (TYLENOL) tablet 650 mg, 650 mg, Oral, Q4H PRN, Cheryl Garcia MD    magnesium hydroxide (MILK OF MAGNESIA) 400 mg/5 mL oral suspension 30 mL, 30 mL, Oral, DAILY PRN, Cheryl Garcia MD    nicotine (NICODERM CQ) 14 mg/24 hr patch 1 Patch, 1 Patch, TransDERmal, DAILY, Cheryl Garcia MD, 1 Patch at 03/19/22 0945    metoprolol tartrate (LOPRESSOR) tablet 50 mg, 50 mg, Oral, BID, Cheryl Garcia MD, 50 mg at 03/19/22 0925    atorvastatin (LIPITOR) tablet 10 mg, 10 mg, Oral, QHS, Cheryl Garcia MD, 10 mg at 03/18/22 2123    divalproex DR (DEPAKOTE) tablet 500 mg, 500 mg, Oral, BID, Jeff Perez MD, 500 mg at 03/19/22 7958  Electronically signed by Yareli Serrano MD on 3/19/2022 at 11:03 AM

## 2022-03-19 NOTE — GROUP NOTE
IP  GROUP DOCUMENTATION INDIVIDUAL                                                                          Group Therapy Note    Date: 3/18/2022    Group Start Time: 1830  Group End Time: 1920  Group Topic: Recreational/Music Therapy    SRM 2  NON ACUTE    Ewa Davila    IP [address] [facility] GROUP DOCUMENTATION GROUP    Group Therapy Note    Attendees: 6/11  Facilitated structured group to introduce healthy leisure task skill as positive way to cope and manage stress. Attendance: Attended    Patient's Goal:  Attend activities and groups    Interventions/techniques: Art integration and Supported    Follows Directions: Followed directions    Interactions: Interacted appropriately    Mental Status: Calm and Flat    Behavior/appearance: Attentive    Goals Achieved: Able to engage in interactions and Able to listen to others    Additional Notes: Attended group and actively participated. Offered leisure packet. Declined packet but listened to music with peers. Able to select songs in group. Was receptive to intervention and used time constructively.     Eugene Flowers, CTRS

## 2022-03-20 PROBLEM — T14.91XA SUICIDE ATTEMPT (HCC): Status: ACTIVE | Noted: 2021-10-22

## 2022-03-20 PROBLEM — N20.0 CALCULUS OF KIDNEY: Status: ACTIVE | Noted: 2019-10-23

## 2022-03-20 PROCEDURE — 65220000003 HC RM SEMIPRIVATE PSYCH

## 2022-03-20 PROCEDURE — 74011250637 HC RX REV CODE- 250/637: Performed by: PSYCHIATRY & NEUROLOGY

## 2022-03-20 RX ADMIN — METOPROLOL TARTRATE 50 MG: 25 TABLET, FILM COATED ORAL at 21:18

## 2022-03-20 RX ADMIN — CITALOPRAM HYDROBROMIDE 20 MG: 20 TABLET ORAL at 08:57

## 2022-03-20 RX ADMIN — DIVALPROEX SODIUM 500 MG: 500 TABLET, DELAYED RELEASE ORAL at 21:17

## 2022-03-20 RX ADMIN — RISPERIDONE 2 MG: 2 TABLET ORAL at 08:57

## 2022-03-20 RX ADMIN — ATORVASTATIN CALCIUM 10 MG: 10 TABLET, FILM COATED ORAL at 21:18

## 2022-03-20 RX ADMIN — METOPROLOL TARTRATE 50 MG: 25 TABLET, FILM COATED ORAL at 08:57

## 2022-03-20 RX ADMIN — DIVALPROEX SODIUM 500 MG: 500 TABLET, DELAYED RELEASE ORAL at 08:57

## 2022-03-20 RX ADMIN — RISPERIDONE 2 MG: 2 TABLET ORAL at 21:17

## 2022-03-20 NOTE — PROGRESS NOTES
Problem: Suicide  Goal: *STG: Remains safe in hospital  Outcome: Progressing Towards Goal  Goal: *STG: Attends activities and groups  Outcome: Progressing Towards Goal  Goal: *STG/LTG: Complies with medication therapy  Outcome: Progressing Towards Goal                                                       NIGHT SHIFT NURSING NOTE  Patient presented as somewhat animated and cordial, calm and pleasant on approach. He denied SI/HI and AVH. Social and visible in the milieu interacting appropriately with staff and peers. He has been cooperative and compliant with medication regimen and did not require and request any PRNs. No acute behavioral issues/concerns. VS WNL.

## 2022-03-20 NOTE — PROGRESS NOTES
Progress Note  Date:3/20/2022       Room:[facility]  Patient Name:Félix Fletcher     YOB: 1960     Age:62 y.o. Subjective    Subjective  Reports feeling better no active SI HI denies any command hallucinations not feeling paranoid not having any hurtful feelings towards others he has been engaging appropriately with peers and staff. Mental status examination-no SI HI  Review of Systems  Objective         Vitals Last 24 Hours:  TEMPERATURE:  Temp  Av.6 °F (36.4 °C)  Min: 97.5 °F (36.4 °C)  Max: 97.8 °F (36.6 °C)  RESPIRATIONS RANGE: Resp  Av  Min: 18  Max: 18  PULSE OXIMETRY RANGE: No data recorded  PULSE RANGE: Pulse  Av  Min: 75  Max: 90  BLOOD PRESSURE RANGE: Systolic (97AJC), TUC:533 , Min:119 , QJK:915   ; Diastolic (03SKS), PMV:65, Min:77, Max:78    I/O (24Hr): No intake or output data in the 24 hours ending 22 1626  Objective  Labs/Imaging/Diagnostics    Labs:  CBC:No results for input(s): WBC, RBC, HGB, HCT, MCV, RDW, PLT, HGBEXT, HCTEXT, PLTEXT in the last 72 hours. CHEMISTRIES:No results for input(s): NA, K, CL, CO2, BUN, CA, PHOS, MG in the last 72 hours. No lab exists for component: CREATININE, GLUCOSEPT/INR:No results for input(s): INR, INREXT in the last 72 hours. No lab exists for component: PROTIME  APTT:No results for input(s): APTT in the last 72 hours. LIVER PROFILE:No results for input(s): AST, ALT in the last 72 hours. No lab exists for component: MARIBEL OrdazPHOS  Lab Results   Component Value Date/Time    ALT (SGPT) 24 2022 10:05 PM    AST (SGOT) 17 2022 10:05 PM    Alk. phosphatase 59 2022 10:05 PM    Bilirubin, total 0.2 2022 10:05 PM       Imaging Last 24 Hours:  No results found.   Assessment//Plan   Active Problems:    Major depression (10/22/2021)      Substance abuse ([facility] [address] (3/16/2022)      Suicidal ideations (3/16/2022)      Assessment & Plan  Continue current medications  Discharge on 3/21/2022      Current Facility-Administered Medications:     risperiDONE (RisperDAL) tablet 2 mg, 2 mg, Oral, BID, Cheryl Garcia MD, 2 mg at 03/20/22 0857    citalopram (CELEXA) tablet 20 mg, 20 mg, Oral, DAILY, Cheryl Garcia MD, 20 mg at 03/20/22 0857    hydrOXYzine HCL (ATARAX) tablet 50 mg, 50 mg, Oral, TID PRN, Faby Tomlinson MD    traZODone (DESYREL) tablet 50 mg, 50 mg, Oral, QHS PRN, Faby Tomlinson MD    acetaminophen (TYLENOL) tablet 650 mg, 650 mg, Oral, Q4H PRN, Cheryl Garcia MD    magnesium hydroxide (MILK OF MAGNESIA) 400 mg/5 mL oral suspension 30 mL, 30 mL, Oral, DAILY PRN, Cehryl Garcia MD    nicotine (NICODERM CQ) 14 mg/24 hr patch 1 Patch, 1 Patch, TransDERmal, DAILY, Cheryl Garcia MD, 1 Patch at 03/19/22 0945    metoprolol tartrate (LOPRESSOR) tablet 50 mg, 50 mg, Oral, BID, Cheryl Garcia MD, 50 mg at 03/20/22 0857    atorvastatin (LIPITOR) tablet 10 mg, 10 mg, Oral, QHS, Cheryl Garcia MD, 10 mg at 03/19/22 2122    divalproex DR (DEPAKOTE) tablet 500 mg, 500 mg, Oral, BID, Faby Tomlinson MD, 500 mg at 03/20/22 9436  Electronically signed by Alec Desai MD on 3/20/2022 at 4:26 PM

## 2022-03-20 NOTE — GROUP NOTE
IP  GROUP DOCUMENTATION INDIVIDUAL                                                                          Group Therapy Note    Date: 3/20/2022    Group Start Time: 5424  Group End Time: 8853  Group Topic: Recreational/Music Therapy    SRM 2  NON ACUTE    Magdy Mckeon    IP [address] [facility] GROUP DOCUMENTATION GROUP    Group Therapy Note    Attendees:9/14  Facilitated structured group to introduce healthy leisure task skill as positive way to cope and manage stress. Attendance: Attended    Patient's Goal:  STG:Attend activities and groups    Interventions/techniques: Art integration and Supported    Follows Directions: Followed directions    Interactions: Interacted appropriately    Mental Status: Calm and Flat    Behavior/appearance: Attentive and Cooperative    Goals Achieved: Able to engage in interactions and Able to listen to others      Additional Notes: Attended group and actively participated. Offered leisure packet. Declined packet but listened to music with peers. Able to select songs in group. Was receptive to intervention and used time constructively.     Radha Zuniga, CTRS no

## 2022-03-20 NOTE — GROUP NOTE
IP  GROUP DOCUMENTATION INDIVIDUAL                                                                          Group Therapy Note    Date: 3/20/2022    Group Start Time: 0930  Group End Time: 7115  Group Topic: Education Group - Inpatient    SRM 2 BH NON ACUTE    Erlene Grams    IP [address] [facility] GROUP DOCUMENTATION GROUP    Group Therapy Note    Attendees: 5/14  Facilitated structured group discussion to define boundaries, identify healthy and unhealthy boundaries and to establish boundaries for those that are in our [place] of support. Attendance: Attended    Patient's Goal:  LTG: Interact with others without defensiveness or anger      Interventions/techniques: Informed and Provide feedback    Follows Directions: Followed directions    Interactions: Interacted appropriately    Mental Status: Calm    Behavior/appearance: Attentive    Goals Achieved: Able to engage in interactions and Able to listen to others with prompts      Additional Notes: Pt participated in group discussion. Accepted materials provided in group. Was able to define boundaries and identify healthy and unhealthy boundaries.  Related he has people that he is close to and that he can trust.     Jung Solis, [address]

## 2022-03-20 NOTE — BH NOTES
Client is pleasant and cooperative. Alert and oriented x 3. Appearance is semi-tidy. He smiles upon approach and jokes with staff. Verbalizes that he he is feeling better. Interacts well with peers. Takes meds as precribed and denies any side effects. Reports that the depression is lifting. Denies hearing any voices today. Remains on close observation for safety.

## 2022-03-21 VITALS
RESPIRATION RATE: 18 BRPM | TEMPERATURE: 98.3 F | HEART RATE: 76 BPM | BODY MASS INDEX: 20.4 KG/M2 | SYSTOLIC BLOOD PRESSURE: 115 MMHG | WEIGHT: 130 LBS | OXYGEN SATURATION: 98 % | HEIGHT: 67 IN | DIASTOLIC BLOOD PRESSURE: 69 MMHG

## 2022-03-21 PROCEDURE — 74011250637 HC RX REV CODE- 250/637: Performed by: PSYCHIATRY & NEUROLOGY

## 2022-03-21 RX ORDER — CITALOPRAM 20 MG/1
20 TABLET, FILM COATED ORAL DAILY
Qty: 30 TABLET | Refills: 1 | Status: ON HOLD | OUTPATIENT
Start: 2022-03-22 | End: 2022-04-04 | Stop reason: SDUPTHER

## 2022-03-21 RX ORDER — NAPROXEN 500 MG/1
500 TABLET ORAL 2 TIMES DAILY WITH MEALS
Qty: 30 TABLET | Refills: 0 | Status: SHIPPED | OUTPATIENT
Start: 2022-03-21

## 2022-03-21 RX ORDER — METOPROLOL TARTRATE 50 MG/1
50 TABLET ORAL 2 TIMES DAILY
Qty: 60 TABLET | Refills: 1 | Status: SHIPPED | OUTPATIENT
Start: 2022-03-21 | End: 2022-04-04

## 2022-03-21 RX ORDER — TRAZODONE HYDROCHLORIDE 50 MG/1
50 TABLET ORAL
Qty: 30 TABLET | Refills: 1 | Status: SHIPPED | OUTPATIENT
Start: 2022-03-21 | End: 2022-04-04

## 2022-03-21 RX ORDER — DIVALPROEX SODIUM 500 MG/1
500 TABLET, DELAYED RELEASE ORAL 2 TIMES DAILY
Qty: 60 TABLET | Refills: 1 | Status: ON HOLD | OUTPATIENT
Start: 2022-03-21 | End: 2022-04-04 | Stop reason: SDUPTHER

## 2022-03-21 RX ORDER — ATORVASTATIN CALCIUM 10 MG/1
10 TABLET, FILM COATED ORAL
Qty: 30 TABLET | Refills: 0 | Status: ON HOLD | OUTPATIENT
Start: 2022-03-21 | End: 2022-04-04 | Stop reason: SDUPTHER

## 2022-03-21 RX ORDER — RISPERIDONE 2 MG/1
2 TABLET, FILM COATED ORAL 2 TIMES DAILY
Qty: 60 TABLET | Refills: 1 | Status: SHIPPED | OUTPATIENT
Start: 2022-03-21 | End: 2022-04-04

## 2022-03-21 RX ADMIN — DIVALPROEX SODIUM 500 MG: 500 TABLET, DELAYED RELEASE ORAL at 08:38

## 2022-03-21 RX ADMIN — CITALOPRAM HYDROBROMIDE 20 MG: 20 TABLET ORAL at 08:38

## 2022-03-21 RX ADMIN — RISPERIDONE 2 MG: 2 TABLET ORAL at 08:38

## 2022-03-21 RX ADMIN — METOPROLOL TARTRATE 50 MG: 25 TABLET, FILM COATED ORAL at 08:38

## 2022-03-21 NOTE — BH NOTES
MEDICARE LETTER:     Pt signed Medicare letter on this date @1142am. Pt reports he is in agreement with d/c and signed paperwork placed on pt's chart.

## 2022-03-21 NOTE — GROUP NOTE
IP  GROUP DOCUMENTATION INDIVIDUAL                                                                          Group Therapy Note    Date: 3/21/2022    Group Start Time: 1320  Group End Time: 1400  Group Topic: Recreational/Music Therapy    SRM 2  NON ACUTE    Dairl Nicanor    IP [address] [facility] GROUP DOCUMENTATION GROUP    Group Therapy Note    Facilitated leisure skills group to reinforce positive coping and to manage mood through music, social interaction, group activities and art task    Attendees: 5/14         Attendance: Attended    Patient's Goal:  Attend group daily     Interventions/techniques: Art integration and Supported    Follows Directions: Followed directions    Interactions: Interacted appropriately    Mental Status: Calm    Behavior/appearance: Cooperative    Goals Achieved: Able to engage in interactions and Able to listen to others      Additional Notes:  Receptive to listening to music and songs he selected. Decline to work on leisure task.  Interacted with staff when prompted    ARTURO Lunsford

## 2022-03-21 NOTE — GROUP NOTE
KEANU  GROUP DOCUMENTATION INDIVIDUAL                                                                          Group Therapy Note    Date: 3/21/2022    Group Start Time: 1020  Group End Time: 0342  Group Topic: [employer]    SRM 2 BH NON ACUTE    Randa Osullivan    IP [facility] GROUP DOCUMENTATION GROUP    Group Therapy Note    Attendees: 14         Attendance: Attended    Patient's Goal:  To see his family member. Interventions/techniques: Supported    Follows Directions:  Followed directions    Interactions: Interacted appropriately    Mental Status: Happy    Behavior/appearance: Cooperative    Goals Achieved: Able to engage in interactions, Able to listen to others and Identified feelings      Additional Notes:   Shiraz Luna

## 2022-03-21 NOTE — PROGRESS NOTES
Problem: Suicide  Goal: *STG: Remains safe in hospital  Outcome: Progressing Towards Goal  Goal: *STG: Attends activities and groups  Outcome: Progressing Towards Goal  Goal: *STG/LTG: Complies with medication therapy  Outcome: Progressing Towards Goal                                                                                 Night Shift Nursing Note    Patient has been calm, pleasant and social with staff and peers. He presented as animated and elated talking loudly at times. He denied SI/HI and AVH. He stated his mood is much better, denied feeling anxious and depressed. He accepted all his scheduled nighttime medications. No acute behavioral issues/concerns. VS WNL.

## 2022-03-21 NOTE — GROUP NOTE
IP  GROUP DOCUMENTATION INDIVIDUAL                                                                          Group Therapy Note    Date: 3/21/2022    Group Start Time: 0935  Group End Time: 8965  Group Topic: Education Group - Inpatient    SRM 2 BH NON ACUTE    Cruzito Deisr    IP [address] [facility] GROUP DOCUMENTATION GROUP    Group Therapy Note    Facilitated discussion focus on breaking negative coping habits by learning to follow the path to a brighter future by using positive coping skills    Attendees: 9/14         Attendance: Attended    Patient's Goal:  Attend group daily     Interventions/techniques: Art integration and Supported    Follows Directions:  Followed directions    Interactions: Interacted appropriately    Mental Status: Calm    Behavior/appearance: Cooperative and Needed prompting    Goals Achieved: Able to engage in interactions, Able to listen to others, Able to self-disclose and Discussed coping      Additional Notes:  Receptive to information discussed and was able to share a stressor, a stress symptom, past negative coping habit such as \"drinking and thoughts to hurt someone\" and a positive way to cope in the future such as \"forgiving the person, listen to music and watch tv\"    Manohar Olivarez

## 2022-03-21 NOTE — PROGRESS NOTES
Problem: Falls - Risk of  Goal: *Absence of Falls  Description: Document Carmelita Counts Fall Risk and appropriate interventions in the flowsheet.   3/21/2022 1517 by Geovani Camacho RN  Outcome: Resolved/Met  Note: Fall Risk Interventions:            Medication Interventions: Teach patient to arise slowly                3/21/2022 0959 by Geovani Camacho RN  Outcome: Progressing Towards Goal  Note: Fall Risk Interventions:            Medication Interventions: Teach patient to arise slowly

## 2022-03-21 NOTE — BH NOTES
Alert, oriented. Calm and pleasant. Affect and mood are congruent. Pt can be loud at times, but is easily redirected. Denies SI/HI a this time. Attending groups and interacting with select peers. Meal and medication compliant. Walking around unit with towel on his head. Reports it helps to \"keep the bad thoughts out. \" although pt denies auditory or visual hallucinations. Remains on close observation for safety.

## 2022-03-21 NOTE — PROGRESS NOTES
Progress Note  Date:3/21/2022       Room:[facility]  Patient Name:Félix Fletcher     YOB: 1960     Age:62 y.o. Subjective    Subjective Patient reports that he is doing well and is ready to leave. He is sleeping well. He denies he has any issues with his medications. He notes that he has no trouble taking his medications. Reviewed chart and medications. Review of Systems  Objective         Vitals Last 24 Hours:  TEMPERATURE:  Temp  Av.1 °F (36.7 °C)  Min: 97.9 °F (36.6 °C)  Max: 98.3 °F (36.8 °C)  RESPIRATIONS RANGE: Resp  Av  Min: 18  Max: 18  PULSE OXIMETRY RANGE: No data recorded  PULSE RANGE: Pulse  Av  Min: 76  Max: 88  BLOOD PRESSURE RANGE: Systolic (26XOB), DWE:109 , Min:115 , CTA:695   ; Diastolic (96RWN), GCF:42, Min:69, Max:69    I/O (24Hr): No intake or output data in the 24 hours ending 22 09  Objective  Labs/Imaging/Diagnostics    Labs:  CBC:No results for input(s): WBC, RBC, HGB, HCT, MCV, RDW, PLT, HGBEXT, HCTEXT, PLTEXT in the last 72 hours. CHEMISTRIES:No results for input(s): NA, K, CL, CO2, BUN, CA, PHOS, MG in the last 72 hours. No lab exists for component: CREATININE, GLUCOSEPT/INR:No results for input(s): INR, INREXT in the last 72 hours. No lab exists for component: PROTIME  APTT:No results for input(s): APTT in the last 72 hours. LIVER PROFILE:No results for input(s): AST, ALT in the last 72 hours. No lab exists for component: Garry Calico, ALKPHOS  Lab Results   Component Value Date/Time    ALT (SGPT) 24 2022 10:05 PM    AST (SGOT) 17 2022 10:05 PM    Alk. phosphatase 59 2022 10:05 PM    Bilirubin, total 0.2 2022 10:05 PM       Imaging Last 24 Hours:  No results found.   Assessment//Plan   Active Problems:    Major depression (10/22/2021)      Substance abuse ([facility] [address] (3/16/2022)      Suicidal ideations (3/16/2022)      Assessment & Plan  Continue current meds  Psychosocial assesment Continue safety planning and thought records    Electronically signed by Sharron Alexis MD on 3/21/2022 at 9:22 AM

## 2022-03-21 NOTE — BH NOTES
Behavioral Health Transition Record to Provider    Patient Name: Mallory Hathaway  YOB: 1960  Medical Record Number: [id number]  Date of Admission: 3/16/2022  Date of Discharge: 3/16/2022    Attending Provider: Carmela Mckeon MD  Discharging Provider: Carmela Mckeon MD  To contact this individual call [phone] and ask the  to page. If unavailable, ask to be transferred to [facility] Provider on call. [facility] Provider will be available on call 24/7 and during holidays. Primary Care Provider: Kae Perez NP    No Known Allergies    Reason for Admission: Pt presented with SI with plan to cut his wrists. He states he has recently relapsed on crack cocaine and ETOH (level in ). Admission Diagnosis: Major depression [F32.9]  Suicidal ideations [R45.851]  Substance abuse ([facility] [address] [F19.10]    * No surgery found *    Results for orders placed or performed during the hospital encounter of 03/16/22   COVID-19 WITH INFLUENZA A/B   Result Value Ref Range    SARS-CoV-2 by PCR Not Detected Not Detected      Influenza A by PCR Not Detected Not Detected      Influenza B by PCR Not Detected Not Detected     CBC WITH AUTOMATED DIFF   Result Value Ref Range    WBC 7.8 4.1 - 11.1 K/uL    RBC 4.76 4.10 - 5.70 M/uL    HGB 13.1 12.1 - 17.0 g/dL    HCT 40.7 36.6 - 50.3 %    MCV 85.5 80.0 - 99.0 FL    MCH 27.5 26.0 - 34.0 PG    MCHC 32.2 30.0 - 36.5 g/dL    RDW 14.8 (H) 11.5 - 14.5 %    PLATELET 624 324 - 634 K/uL    MPV 8.9 8.9 - 12.9 FL    NRBC 0.0 0.0  WBC    ABSOLUTE NRBC 0.00 0.00 - 0.01 K/uL    NEUTROPHILS 63 32 - 75 %    LYMPHOCYTES 29 12 - 49 %    MONOCYTES 8 5 - 13 %    EOSINOPHILS 0 0 - 7 %    BASOPHILS 0 0 - 1 %    IMMATURE GRANULOCYTES 0 0 - 0.5 %    ABS. NEUTROPHILS 4.8 1.8 - 8.0 K/UL    ABS. LYMPHOCYTES 2.3 0.8 - 3.5 K/UL    ABS. MONOCYTES 0.6 0.0 - 1.0 K/UL    ABS. EOSINOPHILS 0.0 0.0 - 0.4 K/UL    ABS. BASOPHILS 0.0 0.0 - 0.1 K/UL    ABS. IMM.  GRANS. 0.0 0.00 - 0.04 K/UL    DF AUTOMATED     METABOLIC PANEL, BASIC   Result Value Ref Range    Sodium 141 136 - 145 mmol/L    Potassium 4.1 3.5 - 5.1 mmol/L    Chloride 111 (H) 97 - 108 mmol/L    CO2 24 21 - 32 mmol/L    Anion gap 6 5 - 15 mmol/L    Glucose 97 65 - 100 mg/dL    BUN 8 6 - 20 mg/dL    Creatinine 1.00 0.70 - 1.30 mg/dL    BUN/Creatinine ratio 8 (L) 12 - 20      GFR est AA >60 >60 ml/min/1.73m2    GFR est non-AA >60 >60 ml/min/1.73m2    Calcium 8.7 8.5 - 10.1 mg/dL   ETHYL ALCOHOL   Result Value Ref Range    ALCOHOL(ETHYL),SERUM 178 (H) <10 mg/dL   URINALYSIS W/ REFLEX CULTURE    Specimen: Urine   Result Value Ref Range    Color Yellow      Appearance Clear Clear      Specific gravity 1.015 1.003 - 1.030      pH (UA) 7.0 5.0 - 8.0      Protein Negative Negative mg/dL    Glucose Normal (A) Negative mg/dL    Ketone Negative Negative mg/dL    Bilirubin Negative Negative      Blood Negative Negative      Urobilinogen 0.1 0.1 - 1.0 EU/dL    Nitrites Negative Negative      Leukocyte Esterase Negative Negative      UA:UC IF INDICATED Culture not indicated by UA result Culture not indicated by UA result      WBC 0-4 0 - 4 /hpf    RBC 0-5 0 - 5 /hpf    Bacteria Negative Negative /hpf   DRUG SCREEN, URINE   Result Value Ref Range    AMPHETAMINES Negative Negative      BARBITURATES Negative Negative      BENZODIAZEPINES Negative Negative      COCAINE Positive (A) Negative      METHADONE Negative Negative      OPIATES Negative Negative      PCP(PHENCYCLIDINE) Negative Negative      THC (TH-CANNABINOL) Negative Negative      Drug screen comment        This test is a screen for drugs of abuse in a medical setting only (i.e., they are unconfirmed results and as such must not be used for non-medical purposes, e.g.,employment testing, legal testing). Due to its inherent nature, false positive (FP) and false negative (FN) results may be obtained.  Therefore, if necessary for medical care, recommend confirmation of positive findings by GC/MS. Immunizations administered during this encounter: There is no immunization history on file for this patient. Screening for Metabolic Disorders for Patients on Antipsychotic Medications  (Data obtained from the EMR)    Estimated Body Mass Index  Estimated body mass index is 20.36 kg/m² as calculated from the following:    Height as of this encounter: 5' 7\" (1.702 m). Weight as of this encounter: 59 kg (130 lb). Vital Signs/Blood Pressure  Visit Vitals  /69   Pulse 76   Temp 98.3 °F (36.8 °C)   Resp 18   Ht 5' 7\" (1.702 m)   Wt 59 kg (130 lb)   SpO2 98%   BMI 20.36 kg/m²       Blood Glucose/Hemoglobin A1c  Lab Results   Component Value Date/Time    Glucose 97 03/16/2022 04:02 AM       No results found for: HBA1C, AMJ3CFPK     Lipid Panel  No results found for: CHOL, CHOLX, CHLST, CHOLV, 188225, HDL, HDLP, LDL, LDLC, DLDLP, TGLX, TRIGL, TRIGP, CHHD, CHHDX     Discharge Diagnosis: Major depression [F32.9]  Suicidal ideations [R45.851]  Substance abuse ([facility] [address] [F19.10]    Discharge Plan: Pt will discharge [place] home [place] [id number]. Pt to contact [facility] for mental health services. Discharge Medication List and Instructions:   Current Discharge Medication List          Unresulted Labs (24h ago, onward)            None        To obtain results of studies pending at discharge, please contact [phone]    Follow-up Information     Follow up With Specialties Details Why [facility]  Call for adult mental health services [phone]          Advanced Directive:   Does the patient have an appointed surrogate decision maker? No  Does the patient have a Medical Advance Directive? No  Does the patient have a Psychiatric Advance Directive?  No  If the patient does not have a surrogate or Medical Advance Directive AND Psychiatric Advance Directive, the patient was offered information on these advance directives Patient will complete at a later time    Patient Instructions: Please continue all medications until otherwise directed by physician. Tobacco Cessation Discharge Plan:   Is the patient a smoker and needs referral for smoking cessation? Not applicable  Patient referred to the following for smoking cessation with an appointment? Not applicable     Patient was offered medication to assist with smoking cessation at discharge? Not applicable  Was education for smoking cessation added to the discharge instructions? Not applicable    Alcohol/Substance Abuse Discharge Plan:   Does the patient have a history of substance/alcohol abuse and requires a referral for treatment? Refused  Patient referred to the following for substance/alcohol abuse treatment with an appointment? Refused  Patient was offered medication to assist with alcohol cessation at discharge? Not applicable  Was education for substance/alcohol abuse added to discharge instructions? No    Patient discharged to home.

## 2022-03-21 NOTE — GROUP NOTE
KEANU  GROUP DOCUMENTATION INDIVIDUAL                                                                          Group Therapy Note    Date: 3/21/2022    Group Start Time: 7802  Group End Time: 7863  Group Topic: Nursing    SRM 2  NON ACUTE    Williams Shane LPN    [facility] GROUP DOCUMENTATION GROUP    Group Therapy Note    Attendees: 9/14         Attendance: Attended    Patient's Goal: Benefits of & Rules for taking medication    Interventions/techniques: Challenged    Follows Directions: Followed directions    Interactions: Interacted appropriately    Mental Status: Calm    Behavior/appearance: Attentive    Goals Achieved: Able to engage in interactions      Additional Notes:  Pt. Stated med help him to be stable.     Grant Hernandez LPN

## 2022-03-21 NOTE — PROGRESS NOTES
Problem: Suicide  Goal: *STG: Attends activities and groups  Outcome: Progressing Towards Goal     Problem: Suicide  Goal: *STG:  Verbalizes alternative ways of dealing with maladaptive feelings/behaviors  Outcome: Progressing Towards Goal     Problem: Suicide  Goal: *STG/LTG: Complies with medication therapy  Outcome: Progressing Towards Goal

## 2022-03-21 NOTE — BH NOTES
DISCHARGE SUMMARY    NAME:Félix Delgadillo  : 1960  MRN: [id number]    The patient Zeus Interiano exhibits the ability to control behavior in a less restrictive environment. Patient's level of functioning is improving. No assaultive/destructive behavior has been observed for the past 24 hours. No suicidal/homicidal threat or behavior has been observed for the past 24 hours. There is no evidence of serious medication side effects. Patient has not been in physical or protective restraints for at least the past 24 hours. If weapons involved, how are they secured? N/A    Is patient aware of and in agreement with discharge plan? Yes    Arrangements for medication:  Prescriptions given to patient, given a weeks supply or 30 day supply. Copy of discharge instructions to provider?:  Yes    Arrangements for transportation home:  [place] [id number]   ETA 1-4 hours. Keep all follow up appointments as scheduled, continue to take prescribed medications per physician instructions. Mental health crisis number:  [phone] or your local mental health crisis line number at [phone].       Mental Health Emergency WARM LINE      [phone] ([phone])      M-F: 9am to 9pm      Sat & Sun: 5pm - 9pm  National suicide prevention lines:                             0-653-JGQLOIK (3-781-811-3794)       3-727-134-TALK ([phone])    Crisis Text Line:  Text HOME to 428659

## 2022-03-21 NOTE — BH NOTES
Pt discharged at 1500 via ambulatory at denying SI.HI or physical complaints. Verified receipt of all persona and safe items. Verbalized understanding of all d/c instructions, prescription information and follow up care appointments. Pt escorted to hospital entrance and taken home by [facility] cab. Pt to  medications from [facility], when reminding pt to  prescriptions, pt indicated that he prefers [address] Verbal and written instructions provided to patient regarding process to have prescriptions transferred from [facility] to the [address] the patient uses.

## 2022-03-24 PROBLEM — R45.851 SUICIDAL IDEATIONS: Status: ACTIVE | Noted: 2022-03-16

## 2022-03-24 PROBLEM — F19.10 SUBSTANCE ABUSE (HCC): Status: ACTIVE | Noted: 2022-03-16

## 2022-03-30 ENCOUNTER — HOSPITAL ENCOUNTER (INPATIENT)
Age: 62
LOS: 5 days | Discharge: HOME | DRG: 881 | End: 2022-04-04
Attending: STUDENT IN AN ORGANIZED HEALTH CARE EDUCATION/TRAINING PROGRAM | Admitting: PSYCHIATRY & NEUROLOGY
Payer: MEDICARE

## 2022-03-30 DIAGNOSIS — F10.920 ALCOHOLIC INTOXICATION WITHOUT COMPLICATION (HCC): ICD-10-CM

## 2022-03-30 DIAGNOSIS — T14.91XA SUICIDAL BEHAVIOR WITH ATTEMPTED SELF-INJURY (HCC): Primary | ICD-10-CM

## 2022-03-30 PROBLEM — F32.9 MDD (MAJOR DEPRESSIVE DISORDER): Status: ACTIVE | Noted: 2022-03-30

## 2022-03-30 LAB
ALBUMIN SERPL-MCNC: 3.2 G/DL (ref 3.5–5)
ALBUMIN/GLOB SERPL: 0.9 {RATIO} (ref 1.1–2.2)
ALP SERPL-CCNC: 55 U/L (ref 45–117)
ALT SERPL-CCNC: 22 U/L (ref 12–78)
AMPHET UR QL SCN: NEGATIVE
ANION GAP SERPL CALC-SCNC: 6 MMOL/L (ref 5–15)
APAP SERPL-MCNC: <10 UG/ML (ref 10–30)
APPEARANCE UR: CLEAR
AST SERPL W P-5'-P-CCNC: 21 U/L (ref 15–37)
ATRIAL RATE: 71 BPM
ATRIAL RATE: 75 BPM
BACTERIA URNS QL MICRO: NEGATIVE /HPF
BARBITURATES UR QL SCN: NEGATIVE
BASOPHILS # BLD: 0 K/UL (ref 0–0.1)
BASOPHILS NFR BLD: 1 % (ref 0–1)
BENZODIAZ UR QL: NEGATIVE
BILIRUB SERPL-MCNC: 0.4 MG/DL (ref 0.2–1)
BILIRUB UR QL: NEGATIVE
BUN SERPL-MCNC: 8 MG/DL (ref 6–20)
BUN/CREAT SERPL: 8 (ref 12–20)
CA-I BLD-MCNC: 8.2 MG/DL (ref 8.5–10.1)
CALCULATED P AXIS, ECG09: 58 DEGREES
CALCULATED P AXIS, ECG09: 71 DEGREES
CALCULATED R AXIS, ECG10: 16 DEGREES
CALCULATED R AXIS, ECG10: 30 DEGREES
CALCULATED T AXIS, ECG11: 27 DEGREES
CALCULATED T AXIS, ECG11: 27 DEGREES
CANNABINOIDS UR QL SCN: NEGATIVE
CHLORIDE SERPL-SCNC: 117 MMOL/L (ref 97–108)
CO2 SERPL-SCNC: 22 MMOL/L (ref 21–32)
COCAINE UR QL SCN: POSITIVE
COLOR UR: NORMAL
CREAT SERPL-MCNC: 1 MG/DL (ref 0.7–1.3)
DIAGNOSIS, 93000: NORMAL
DIAGNOSIS, 93000: NORMAL
DIFFERENTIAL METHOD BLD: ABNORMAL
DRUG SCRN COMMENT,DRGCM: ABNORMAL
EOSINOPHIL # BLD: 0 K/UL (ref 0–0.4)
EOSINOPHIL NFR BLD: 1 % (ref 0–7)
ERYTHROCYTE [DISTWIDTH] IN BLOOD BY AUTOMATED COUNT: 15.5 % (ref 11.5–14.5)
ETHANOL SERPL-MCNC: 185 MG/DL
ETHANOL SERPL-MCNC: 60 MG/DL
FLUAV RNA SPEC QL NAA+PROBE: NOT DETECTED
FLUBV RNA SPEC QL NAA+PROBE: NOT DETECTED
GLOBULIN SER CALC-MCNC: 3.4 G/DL (ref 2–4)
GLUCOSE SERPL-MCNC: 104 MG/DL (ref 65–100)
GLUCOSE UR STRIP.AUTO-MCNC: NEGATIVE MG/DL
HCT VFR BLD AUTO: 35.3 % (ref 36.6–50.3)
HGB BLD-MCNC: 11.3 G/DL (ref 12.1–17)
HGB UR QL STRIP: NEGATIVE
IMM GRANULOCYTES # BLD AUTO: 0 K/UL (ref 0–0.04)
IMM GRANULOCYTES NFR BLD AUTO: 0 % (ref 0–0.5)
KETONES UR QL STRIP.AUTO: NEGATIVE MG/DL
LEUKOCYTE ESTERASE UR QL STRIP.AUTO: NEGATIVE
LYMPHOCYTES # BLD: 1.8 K/UL (ref 0.8–3.5)
LYMPHOCYTES NFR BLD: 47 % (ref 12–49)
MAGNESIUM SERPL-MCNC: 1.6 MG/DL (ref 1.6–2.4)
MCH RBC QN AUTO: 27.7 PG (ref 26–34)
MCHC RBC AUTO-ENTMCNC: 32 G/DL (ref 30–36.5)
MCV RBC AUTO: 86.5 FL (ref 80–99)
METHADONE UR QL: NEGATIVE
MONOCYTES # BLD: 0.4 K/UL (ref 0–1)
MONOCYTES NFR BLD: 12 % (ref 5–13)
NEUTS SEG # BLD: 1.5 K/UL (ref 1.8–8)
NEUTS SEG NFR BLD: 39 % (ref 32–75)
NITRITE UR QL STRIP.AUTO: NEGATIVE
NRBC # BLD: 0 K/UL (ref 0–0.01)
NRBC BLD-RTO: 0 PER 100 WBC
OPIATES UR QL: NEGATIVE
P-R INTERVAL, ECG05: 152 MS
P-R INTERVAL, ECG05: 162 MS
PCP UR QL: NEGATIVE
PH UR STRIP: 6 [PH] (ref 5–8)
PLATELET # BLD AUTO: 277 K/UL (ref 150–400)
PMV BLD AUTO: 9.2 FL (ref 8.9–12.9)
POTASSIUM SERPL-SCNC: 4 MMOL/L (ref 3.5–5.1)
PROT SERPL-MCNC: 6.6 G/DL (ref 6.4–8.2)
PROT UR STRIP-MCNC: NEGATIVE MG/DL
Q-T INTERVAL, ECG07: 420 MS
Q-T INTERVAL, ECG07: 452 MS
QRS DURATION, ECG06: 82 MS
QRS DURATION, ECG06: 96 MS
QTC CALCULATION (BEZET), ECG08: 469 MS
QTC CALCULATION (BEZET), ECG08: 491 MS
RBC # BLD AUTO: 4.08 M/UL (ref 4.1–5.7)
RBC #/AREA URNS HPF: NORMAL /HPF (ref 0–5)
SALICYLATES SERPL-MCNC: <1.7 MG/DL (ref 2.8–20)
SARS-COV-2, COV2: NOT DETECTED
SODIUM SERPL-SCNC: 145 MMOL/L (ref 136–145)
SP GR UR REFRACTOMETRY: 1 (ref 1–1.03)
TROPONIN-HIGH SENSITIVITY: 10 NG/L (ref 0–76)
UA: UC IF INDICATED,UAUC: NORMAL
UROBILINOGEN UR QL STRIP.AUTO: 0.1 EU/DL (ref 0.1–1)
VALPROATE SERPL-MCNC: 15 UG/ML (ref 50–100)
VALPROATE SERPL-MCNC: 41 UG/ML (ref 50–100)
VENTRICULAR RATE, ECG03: 71 BPM
VENTRICULAR RATE, ECG03: 75 BPM
WBC # BLD AUTO: 3.8 K/UL (ref 4.1–11.1)
WBC URNS QL MICRO: NORMAL /HPF (ref 0–4)

## 2022-03-30 PROCEDURE — 80053 COMPREHEN METABOLIC PANEL: CPT

## 2022-03-30 PROCEDURE — 84484 ASSAY OF TROPONIN QUANT: CPT

## 2022-03-30 PROCEDURE — 83735 ASSAY OF MAGNESIUM: CPT

## 2022-03-30 PROCEDURE — 80307 DRUG TEST PRSMV CHEM ANLYZR: CPT

## 2022-03-30 PROCEDURE — 80179 DRUG ASSAY SALICYLATE: CPT

## 2022-03-30 PROCEDURE — 93005 ELECTROCARDIOGRAM TRACING: CPT

## 2022-03-30 PROCEDURE — 80143 DRUG ASSAY ACETAMINOPHEN: CPT

## 2022-03-30 PROCEDURE — 85025 COMPLETE CBC W/AUTO DIFF WBC: CPT

## 2022-03-30 PROCEDURE — 36415 COLL VENOUS BLD VENIPUNCTURE: CPT

## 2022-03-30 PROCEDURE — 82077 ASSAY SPEC XCP UR&BREATH IA: CPT

## 2022-03-30 PROCEDURE — 99285 EMERGENCY DEPT VISIT HI MDM: CPT

## 2022-03-30 PROCEDURE — 80164 ASSAY DIPROPYLACETIC ACD TOT: CPT

## 2022-03-30 PROCEDURE — 87636 SARSCOV2 & INF A&B AMP PRB: CPT

## 2022-03-30 PROCEDURE — 74011250637 HC RX REV CODE- 250/637: Performed by: PSYCHIATRY & NEUROLOGY

## 2022-03-30 PROCEDURE — 81001 URINALYSIS AUTO W/SCOPE: CPT

## 2022-03-30 PROCEDURE — 65220000003 HC RM SEMIPRIVATE PSYCH

## 2022-03-30 RX ORDER — METOPROLOL TARTRATE 50 MG/1
50 TABLET ORAL 2 TIMES DAILY
Status: DISCONTINUED | OUTPATIENT
Start: 2022-03-30 | End: 2022-04-04 | Stop reason: HOSPADM

## 2022-03-30 RX ORDER — TRAZODONE HYDROCHLORIDE 50 MG/1
50 TABLET ORAL
Status: DISCONTINUED | OUTPATIENT
Start: 2022-03-30 | End: 2022-04-04 | Stop reason: HOSPADM

## 2022-03-30 RX ORDER — RISPERIDONE 2 MG/1
2 TABLET, FILM COATED ORAL 2 TIMES DAILY
Status: DISCONTINUED | OUTPATIENT
Start: 2022-03-30 | End: 2022-04-04 | Stop reason: HOSPADM

## 2022-03-30 RX ORDER — CALCIUM GLUCONATE 94 MG/ML
1 INJECTION, SOLUTION INTRAVENOUS
Status: ACTIVE | OUTPATIENT
Start: 2022-03-30 | End: 2022-03-30

## 2022-03-30 RX ORDER — CITALOPRAM 20 MG/1
20 TABLET, FILM COATED ORAL DAILY
Status: DISCONTINUED | OUTPATIENT
Start: 2022-03-31 | End: 2022-04-04 | Stop reason: HOSPADM

## 2022-03-30 RX ORDER — ADHESIVE BANDAGE
30 BANDAGE TOPICAL DAILY PRN
Status: DISCONTINUED | OUTPATIENT
Start: 2022-03-30 | End: 2022-04-04 | Stop reason: HOSPADM

## 2022-03-30 RX ORDER — DIVALPROEX SODIUM 500 MG/1
500 TABLET, DELAYED RELEASE ORAL 2 TIMES DAILY
Status: DISCONTINUED | OUTPATIENT
Start: 2022-03-30 | End: 2022-04-04 | Stop reason: HOSPADM

## 2022-03-30 RX ORDER — ATORVASTATIN CALCIUM 10 MG/1
10 TABLET, FILM COATED ORAL
Status: DISCONTINUED | OUTPATIENT
Start: 2022-03-30 | End: 2022-04-04 | Stop reason: HOSPADM

## 2022-03-30 RX ADMIN — METOPROLOL TARTRATE 50 MG: 50 TABLET, FILM COATED ORAL at 21:05

## 2022-03-30 RX ADMIN — ATORVASTATIN CALCIUM 10 MG: 10 TABLET, FILM COATED ORAL at 21:06

## 2022-03-30 RX ADMIN — RISPERIDONE 2 MG: 2 TABLET ORAL at 21:05

## 2022-03-30 RX ADMIN — DIVALPROEX SODIUM 500 MG: 500 TABLET, DELAYED RELEASE ORAL at 21:05

## 2022-03-30 RX ADMIN — TRAZODONE HYDROCHLORIDE 50 MG: 50 TABLET ORAL at 21:06

## 2022-03-30 NOTE — PROGRESS NOTES
Problem: Suicide  Goal: *STG: Remains safe in hospital  Outcome: Progressing Towards Goal  Goal: *STG/LTG: No longer expresses self destructive or suicidal thoughts  Outcome: Progressing Towards Goal     Problem: Falls - Risk of  Goal: *Absence of Falls  Description: Document Janine Fall Risk and appropriate interventions in the flowsheet.   Outcome: Progressing Towards Goal  Note: Fall Risk Interventions:

## 2022-03-30 NOTE — ED NOTES
Pt in bed resting with eyes closed at this time. Pt states took all medications in bag in a SI attempt. Denies any SI/HI at this time.   Writer will continue to monitor

## 2022-03-30 NOTE — ED TRIAGE NOTES
BIBA, per EMS, pt took all his medication in an attempt to hurt himself. Pt states \"I took them all evening\" empty medication bottles at bedside show medications filled on 3/23/22 pt is A&Ox3. EMS administered 1L NS.      Divalproex DR 500mg (60 tabs)  Metoprolol Tartrate 50mg (60 tabs)  Citalopram 20mg (30 tabs)  Atorvastatin 10mg (30tabs)  Risperidone 2mg (60tabs)  Trazondone 50mg (30 tabs)

## 2022-03-30 NOTE — ED PROVIDER NOTES
EMERGENCY DEPARTMENT HISTORY AND PHYSICAL EXAM      Date: 3/30/2022  Patient Name: Mary Guillermo      History of Presenting Illness     Chief Complaint   Patient presents with    Suicidal       History Provided By: Patient and EMS    HPI: aMry Guillermo, 65 y.o. male with PMH of alcohol abuse disorder, psychiatric disorder, hypertension and hyperlipidemia brought in by EMS for suicidal attempt with intentional drug overdose. Patient had medication filled on 23 March and those medications are Divaloprox, atorvastatin, risperidone, metoprolol, trazodone, and citalopram report that he has been taking all of them all night while drinking in a suicide attempt. Presenting complaint of moderate severity, no aggravating leaving factors with no association symptoms. Patient is not reporting any symptoms right now. There are no other complaints, changes, or physical findings at this time.     PCP: Ayleen Liz NP    Current Facility-Administered Medications   Medication Dose Route Frequency Provider Last Rate Last Admin    traZODone (DESYREL) tablet 50 mg  50 mg Oral QHS PRN Sarah Abdalla MD        magnesium hydroxide (MILK OF MAGNESIA) 400 mg/5 mL oral suspension 30 mL  30 mL Oral DAILY PRN Sarah Abdalla MD        atorvastatin (LIPITOR) tablet 10 mg  10 mg Oral QHS Sarah Abdalla MD   10 mg at 04/02/22 2114    traZODone (DESYREL) tablet 50 mg  50 mg Oral QHS Sarah Abdalla MD   50 mg at 04/02/22 2115    risperiDONE (RisperDAL) tablet 2 mg  2 mg Oral BID Sarah Abdalla MD   2 mg at 04/03/22 0849    divalproex DR (DEPAKOTE) tablet 500 mg  500 mg Oral BID Sarah Abdalla MD   500 mg at 04/03/22 0850    metoprolol tartrate (LOPRESSOR) tablet 50 mg  50 mg Oral BID Sarah Abdalla MD   50 mg at 04/03/22 0849    citalopram (CELEXA) tablet 20 mg  20 mg Oral DAILY Sarah Abdalla MD   20 mg at 04/03/22 0900       Past History     Past Medical History:  Past Medical History:   Diagnosis Date    Homicide attempt     Hypercholesterolemia  Hypertension     Psychiatric disorder     [facility]     [facility]     Substance abuse ([facility]     Suicidal thoughts     TBI (traumatic brain injury) ([facility]        Past Surgical History:  Past Surgical History:   Procedure Laterality Date    HX NEPHROSTOMY         Family History:  Family History   Problem Relation Age of Onset    Hypertension Father        Social History:  Social History     Tobacco Use    Smoking status: Never Smoker    Smokeless tobacco: Current User   Vaping Use    Vaping Use: Never used   Substance Use Topics    Alcohol use: Yes     Alcohol/week: 6.0 standard drinks     Types: 6 Cans of beer per week     Comment: 40oz/day    Drug use: Yes     Types: Cocaine     Comment: Last used today       Allergies:  No Known Allergies      Review of Systems     Review of Systems   Unable to perform ROS: Other   Alcohol intoxication      Physical Exam     Physical Exam  Vitals and nursing note reviewed. Constitutional:       General: He is not in acute distress. Appearance: He is not ill-appearing. HENT:      Head: Normocephalic and atraumatic. Cardiovascular:      Rate and Rhythm: Normal rate and regular rhythm. Heart sounds: Normal heart sounds. Pulmonary:      Effort: Pulmonary effort is normal.      Breath sounds: Normal breath sounds. Abdominal:      Palpations: Abdomen is soft. Tenderness: There is no abdominal tenderness. Musculoskeletal:      Cervical back: Normal range of motion and neck supple. Right lower leg: No tenderness. Left lower leg: No tenderness. Skin:     General: Skin is warm and dry. Neurological:      Mental Status: He is alert and oriented to person, place, and time. Psychiatric:         Behavior: Behavior normal.         Thought Content: Thought content includes suicidal ideation.          Lab and Diagnostic Study Results     Labs -   No results found for this or any previous visit (from the past 12 hour(s)). Radiologic Studies -   [unfilled]  CT Results  (Last 48 hours)    None        CXR Results  (Last 48 hours)    None          Medical Decision Making and ED Course   - I am the first and primary provider for this patient AND AM THE PRIMARY PROVIDER OF RECORD. - I reviewed the vital signs, available nursing notes, past medical history, past surgical history, family history and social history. - Initial assessment performed. The patients presenting problems have been discussed, and the staff are in agreement with the care plan formulated and outlined with them. I have encouraged them to ask questions as they arise throughout their visit. Vital Signs-Reviewed the patient's vital signs. Patient Vitals for the past 24 hrs:   Temp Pulse Resp BP SpO2   04/03/22 2006 98.1 °F (36.7 °C) 61 16 (!) 117/59 97 %   04/03/22 0712 98.6 °F (37 °C) (!) 58 18 127/83 --       Records Reviewed: Nursing Notes and Old Medical Records    Provider Notes (Medical Decision Making):     ED Course as of 04/03/22 2101   Wed Mar 30, 2022   0550 Patient presented to the ED with overdose while intoxicated on Divaloprox, atorvastatin, risperidone, metoprolol, trazodone and citalopram.  Will get behavioral health labs including CBC, CMP, salicylate, acetaminophen level, urinalysis and drug screen. We will also get magnesium and alcohol level. Will obtain an EKG, keep the patient on cardiac monitor. I did note that the patient blood pressure is soft and thus in the setting of history of hypertension this is likely relative hypotension I gave 1 L of normal saline and 1 g of calcium gluconate. [AA]   0600 EKG was obtained at 5:57 AM interpreted by me as NSR rate of 71, HI within normal, QRS within normal, QTC is prolonged, axis within normal, no ST ovation depression, no signs of dysrhythmia or blocks. [AA]   [id number] Case was initiated with [place] poison control. They recommended adding valproic acid.   Based on valproic acid level, will need to repeat valproic acid in 6 hours to make sure is trending down. If the patient becomes bradycardic and or hypotensive, can give a second liter of IV fluids and can give up to 3 to 6 g of calcium gluconate. Additionally, can give glucagon as needed if symptoms are not improving and in terms of pressor choice will use Levophed. [AA]   0715 Pt was signed out to Dr. Darby Harrison to f/up on patient w/up, re-eval, and disposition.  [AA]   1521 Received in sign out. Medically cleared. [address] hold. [LW]   1640 COVID neg. Repeat EKG WNL. EKG performed at 1608, read at 1611. Normal sinus rhythm with a rate of 75. Normal CT, normal QRS, normal QTC. Normal axis. Normal ECG. [LW]      ED Course User Index  [AA] Katarina Guadarrama MD  [LW] Mai Nuñez MD         Disposition     Disposition: Condition stable    Admitted    Diagnosis     Clinical Impression:   1. Suicidal behavior with attempted self-injury ([address]    2. Alcoholic intoxication without complication ([address]        Attestations: Delphine Habermann, MD    Please note that this dictation was completed with Message[employer], the computer voice recognition software. Quite often unanticipated grammatical, syntax, homophones, and other interpretive errors are inadvertently transcribed by the computer software. Please disregard these errors. Please excuse any errors that have escaped final proofreading. Thank you.

## 2022-03-30 NOTE — ED NOTES
TRANSFER - OUT REPORT:    Verbal report given to PRINCE Chavez (name) on Edward Michelle  being transferred to [address] [place] [address] (unit) for routine progression of care       Report consisted of patients Situation, Background, Assessment and   Recommendations(SBAR). Information from the following report(s) SBAR, Kardex and ED Summary was reviewed with the receiving nurse. Lines:       Opportunity for questions and clarification was provided.       Patient transported with:   Lateral SV

## 2022-03-30 NOTE — PROGRESS NOTES
Patient is a 65year old  male admitted to [address] [place] [address] under the care of Dr. Carson Tenorio. Patient is voluntary with no prescreen. Patient has a diagnosis of Suicidal attempt. Patient called ems stating he had taken an intentional overdose of his medications. He states he just missed us. Patient is not stating a reason why he attempted suicide. He denies si/hi/avh, anxiety and depression. Patient smiling and pleasant upon arrival to the unit. Patient states he smokes crack and drinks alcohol everyday. He states he smokes a pack of cigarettes daily but does not need a nicotine patch. Patient denies any signs of withdrawals. He was recently discharged from here. Patient has good eye contact and is stuttering during assessment. Patient denies any medical history. He is calm and cooperative at this time.

## 2022-03-30 NOTE — BSMART NOTE
Comprehensive Assessment Form Part 1      Section I - Disposition    Axis I - MDD with SA   Axis II -   Axis III -   Axis IV -   Axis V -       The Medical Doctor to Psychiatrist conference was not completed. The Medical Doctor is in agreement with Psychiatrist disposition because of (reason) SA-pt took OD. The plan is Medically clear and adm BH. Pt is to have a repeat Valproic Acid at 12noon. The on-call Psychiatrist consulted was Dr. Ron Gtz. The admitting Psychiatrist will be Dr. Ron Gtz. The admitting Diagnosis is MDD with SA. The Payor source is Medicare/Medicaid. Section II - Integrated Summary  Summary:  Per triage note Pt took and OD of his medications in an attempt to harm himself. Pt assessed face to face in ED 20 with 1:1 in the room. Pt states he took OD of his medications in SA, unknown trigger. Pt denies HI Hallucinations. Pt denies past psych IP or having a current psychiatrist. Pt denies medical h/o, past trauma, family menta illness h/o and legal issues. Pt is vol to stay IP [address] after medically cleared. The patienthas demonstrated mental capacity to provide informed consent. The information is given by the patient. The Chief Complaint is OD. The Precipitant Factors are Unknown. Previous Hospitalizations: Denies  The patient has not previously been in restraints. Current Psychiatrist and/or  is denies. Lethality Assessment:    The potential for suicide noted by the following: current attempt . The potential for homicide is not noted. The patient has not been a perpetrator of sexual or physical abuse. There are not pending charges. The patient is felt to be at risk for self harm or harm to others. The attending nurse was advised to remove potentially harmful or dangerous items from the patient's room , to remove patient clothing and place it out of immediate access to the patient and the patient needs supervision.     Section III - Psychosocial  The patient's overall mood and attitude is \"fine\". Feelings of helplessness and hopelessness are not observed. Generalized anxiety is not observed. Panic is not observed. Phobias are not observed. Obsessive compulsive tendencies are not observed. Section IV - Mental Status Exam  The patient's appearance shows poor hygiene. The patient's behavior shows poor eye contact. The patient is oriented to time, place, person and situation. The patient's speech is soft. The patient's mood is depressed and is sad. The range of affect is flat. The patient's thought content demonstrates no evidence of impairment. The thought process is circumstantial.  The patient's perception shows no evidence of impairment. The patient's memory shows no evidence of impairment. The patient's appetite shows no evidence of impairment. The patient's sleep shows no evidence of impairment. The patient's insight shows no evidence of impairment. The patient's judgement shows no evidence of impairment. Section V - Substance Abuse  The patient is not using substances. The patient is using no UDS result The patient has experienced the following withdrawal symptoms: N/A. Section VI - Living Arrangements  The patient is single. The patient lives with a  Nephew. The patient has no children. The patient does plan to return home upon discharge. The patient does not have legal issues pending. The patient's source of income comes from disability. Jew and cultural practices have not been voiced at this time. The patient's greatest support comes from none and this person NA be involved with the treatment. The patient has not been in an event described as horrible or outside the realm of ordinary life experience either currently or in the past.  The patient has not been a victim of sexual/physical abuse.     Section VII - Other Areas of Clinical Concern  The highest grade achieved is 12th grade with the overall quality of school experience being described as uneventful. The patient is currently unemployed and speaks [place] as a primary language. The patient has no communication impairments affecting communication. The patient's preference for learning can be described as: has difficulty with reading and writing.   The patient's hearing is normal.  The patient's vision is normal.      Ирина Avalos RN

## 2022-03-31 PROCEDURE — 74011250637 HC RX REV CODE- 250/637: Performed by: PSYCHIATRY & NEUROLOGY

## 2022-03-31 PROCEDURE — 65220000003 HC RM SEMIPRIVATE PSYCH

## 2022-03-31 RX ADMIN — DIVALPROEX SODIUM 500 MG: 500 TABLET, DELAYED RELEASE ORAL at 21:20

## 2022-03-31 RX ADMIN — ATORVASTATIN CALCIUM 10 MG: 10 TABLET, FILM COATED ORAL at 21:20

## 2022-03-31 RX ADMIN — DIVALPROEX SODIUM 500 MG: 500 TABLET, DELAYED RELEASE ORAL at 08:19

## 2022-03-31 RX ADMIN — RISPERIDONE 2 MG: 2 TABLET ORAL at 21:21

## 2022-03-31 RX ADMIN — METOPROLOL TARTRATE 50 MG: 50 TABLET, FILM COATED ORAL at 21:21

## 2022-03-31 RX ADMIN — METOPROLOL TARTRATE 50 MG: 50 TABLET, FILM COATED ORAL at 08:19

## 2022-03-31 RX ADMIN — TRAZODONE HYDROCHLORIDE 50 MG: 50 TABLET ORAL at 21:21

## 2022-03-31 RX ADMIN — CITALOPRAM HYDROBROMIDE 20 MG: 20 TABLET ORAL at 08:19

## 2022-03-31 RX ADMIN — RISPERIDONE 2 MG: 2 TABLET ORAL at 08:19

## 2022-03-31 NOTE — BH NOTES
tyPSYCHOSOCIAL ASSESSMENT  :Patient identifying info:   Yarely Campos is a 65 y.o., male admitted 3/30/2022  5:53 AM     Presenting problem and precipitating factors: Pt reports that he admitted himself due to a recent relapse on alcohol and cocaine. Pt reports that he has been stressed and endorsed SI. Per triage note, pt took an OD of his medications in an attempt to harm himself. Mental status assessment: Pt was oriented x4. Pt denies any current SI, HI, AVH. Pt has some insight regarding mental health and substance use. Pt displayed a bright affect and was pleasant and talkative with writer. Strengths/Recreation/Coping Skills: Pt reports \"cutting grass, playing on the computer, loves talking to people. \"    Collateral information: Sister-in-law, Megan Fletcher    Current psychiatric /substance abuse providers and contact info: No providers    Previous psychiatric/substance abuse providers and response to treatment: Hospitalized recently at [facility]    Family history of mental illness or substance abuse: No    Substance abuse history:  Pt reports alcohol and cocaine use. Social History     Tobacco Use    Smoking status: Never Smoker    Smokeless tobacco: Current User   Substance Use Topics    Alcohol use: Yes     Alcohol/week: 6.0 standard drinks     Types: 6 Cans of beer per week     Comment: 40oz/day       History of biomedical complications associated with substance abuse: No    Patient's current acceptance of treatment or motivation for change: \"To get off all this stuff I am drinking. \"    Family constellation: Lives with nephew, supportive sister    Is significant other involved?      Describe support system: lives with nephew, supportive sister    Describe living arrangements and home environment: Lives at home with nephew    GUARDIAN/POA: NO    Guardian Name: N/A    Guardian Contact: N/A    Health issues:   Hospital Problems  Date Reviewed: 2/9/2021          Codes Class Noted POA    MDD (major depressive disorder) ICD-10-CM: F32.9  ICD-9-CM: 296.20  3/30/2022 Unknown        Substance abuse ([facility] ICD-10-CM: F19.10  ICD-9-CM: 305.90  3/16/2022 Unknown        Suicidal ideations ICD-10-CM: R45.851  ICD-9-CM: V62.84  3/16/2022 Unknown              Trauma history: Brain injury    Legal issues: None reported    History of  service: No    Financial status: SSDI    Buddhism/cultural factors: \"Yazidi\"    Education/work history: 12th grade, graduated    Have you been licensed as a health care professional (current or ): No    Leisure and recreation preferences: Pt reports cooking and playing on the computer.     Describe coping skills:  Cooking    [employer]  3/31/2022

## 2022-03-31 NOTE — GROUP NOTE
KEANU  GROUP DOCUMENTATION INDIVIDUAL                                                                          Group Therapy Note    Date: 3/31/2022    Group Start Time: 4339  Group End Time: 1600  Group Topic: Recreational/Music Therapy    [facility]    Pippa Pickard    IP [facility] GROUP DOCUMENTATION GROUP    Group Therapy Note    Attendees: 6/10    Writer facilitated a relaxation/music therapy group. Writer encouraged pts to complete leisure packets and to choose and reflect on various songs. Writer concluded session by encouraging pts to give their feedback regarding the group. Attendance: Attended    Patient's Goal:  To attend and participate in groups daily    Interventions/techniques: Other Relaxation    Follows Directions: Followed directions    Interactions: Interacted appropriately    Mental Status: Calm and Congruent    Behavior/appearance: Attentive and Withdrawn/quiet    Goals Achieved: Able to experience relief/decrease in symptoms      Additional Notes:  Pt was able to complete leisure packet and was able to choose a song. Pt is making progress by attending and participating in groups daily.      Krissy Marie

## 2022-03-31 NOTE — CONSULTS
Consult    Patient: Junior Matamoros MRN: [id number]  SSN: [id number]    YOB: 1960  Age: 65 y.o. Sex: male      Subjective:      Junior Matamoros is a 65 y.o. male who is being seen for seizures, substance abuse, suicidal thoughts, traumatic brain injury hypertension    Past Medical History:   Diagnosis Date    Homicide attempt     Hypercholesterolemia     Hypertension     Psychiatric disorder     [facility]     Se[facility]ures [facility]     Substance abuse ([facility]     Suicidal thoughts     TBI (traumatic brain injury) ([facility]      Past Surgical History:   Procedure Laterality Date    HX NEPHROSTOMY        Family History   Problem Relation Age of Onset    Hypertension Father      Social History     Tobacco Use    Smoking status: Never Smoker    Smokeless tobacco: Current User   Substance Use Topics    Alcohol use:  Yes     Alcohol/week: 6.0 standard drinks     Types: 6 Cans of beer per week     Comment: 40oz/day      Current Facility-Administered Medications   Medication Dose Route Frequency Provider Last Rate Last Admin    traZODone (DESYREL) tablet 50 mg  50 mg Oral QHS PRN Lissett Hummel MD        magnesium hydroxide (MILK OF MAGNESIA) 400 mg/5 mL oral suspension 30 mL  30 mL Oral DAILY PRN Lissett Hummel MD        atorvastatin (LIPITOR) tablet 10 mg  10 mg Oral QHS Aida Garcia MD   10 mg at 03/30/22 2106    traZODone (DESYREL) tablet 50 mg  50 mg Oral QHS Lissett Hummel MD   50 mg at 03/30/22 2106    risperiDONE (RisperDAL) tablet 2 mg  2 mg Oral BID Lissett Hummel MD   2 mg at 03/31/22 9804    divalproex DR (DEPAKOTE) tablet 500 mg  500 mg Oral BID Lissett Hummel MD   500 mg at 03/31/22 0819    metoprolol tartrate (LOPRESSOR) tablet 50 mg  50 mg Oral BID Lissett Hummel MD   50 mg at 03/31/22 0819    citalopram (CELEXA) tablet 20 mg  20 mg Oral DAILY Lissett Hummel MD   20 mg at 03/31/22 0819        No Known Allergies    Review of Systems:  A comprehensive review of systems was negative except for that written in the History of Present Illness. Objective:     Vitals:    03/30/22 1700 03/30/22 1830 03/30/22 1938 03/31/22 0753   BP: 120/83 120/83 121/72 112/71   Pulse: 69 69 83 76   Resp: 16 16 18 18   Temp:  97.5 °F (36.4 °C) 98.6 °F (37 °C) 98.7 °F (37.1 °C)   SpO2: 97%  100%    Weight:       Height:            Physical Exam:  General:  Alert, cooperative, no distress, appears stated age. Eyes:  Conjunctivae/corneas clear. PERRL, EOMs intact. Fundi benign   Ears:  Normal TMs and external ear canals both ears. Nose: Nares normal. Septum midline. Mucosa normal. No drainage or sinus tenderness. Mouth/Throat: Lips, mucosa, and tongue normal. Teeth and gums normal.   Neck: Supple, symmetrical, trachea midline, no adenopathy, thyroid: no enlargment/tenderness/nodules, no carotid bruit and no JVD. Back:   Symmetric, no curvature. ROM normal. No CVA tenderness. Lungs:   Clear to auscultation bilaterally. Heart:  Regular rate and rhythm, S1, S2 normal, no murmur, click, rub or gallop. Abdomen:   Soft, non-tender. Bowel sounds normal. No masses,  No organomegaly. Extremities: Extremities normal, atraumatic, no cyanosis or edema. Pulses: 2+ and symmetric all extremities. Skin: Skin color, texture, turgor normal. No rashes or lesions   Lymph nodes: Cervical, supraclavicular, and axillary nodes normal.   Neurologic: CNII-XII intact. Normal strength, sensation and reflexes throughout. No results found for this or any previous visit (from the past 24 hour(s)). No current facility-administered medications on file prior to encounter. Current Outpatient Medications on File Prior to Encounter   Medication Sig Dispense Refill    atorvastatin (LIPITOR) 10 mg tablet Take 1 Tablet by mouth nightly. 30 Tablet 0    citalopram (CELEXA) 20 mg tablet Take 1 Tablet by mouth daily. 30 Tablet 1    divalproex DR (DEPAKOTE) 500 mg tablet Take 1 Tablet by mouth two (2) times a day.  60 Tablet 1    metoprolol tartrate (LOPRESSOR) 50 mg tablet Take 1 Tablet by mouth two (2) times a day. 60 Tablet 1    naproxen (NAPROSYN) 500 mg tablet Take 1 Tablet by mouth two (2) times daily (with meals). 30 Tablet 0    risperiDONE (RisperDAL) 2 mg tablet Take 1 Tablet by mouth two (2) times a day. 60 Tablet 1    traZODone (DESYREL) 50 mg tablet Take 1 Tablet by mouth nightly as needed for Sleep (For insomnia).  30 Tablet 1       Assessment:     Hospital Problems  Date Reviewed: 2/9/2021          Codes Class Noted POA    MDD (major depressive disorder) ICD-10-CM: F32.9  ICD-9-CM: 296.20  3/30/2022 Unknown        Substance abuse ([facility] ICD-10-CM: F19.10  ICD-9-CM: 305.90  3/16/2022 Unknown        Suicidal ideations ICD-10-CM: M21.998  ICD-9-CM: V62.84  3/16/2022 Unknown          Hypertension metoprolol  Plan:   Continue present medications and treatment plan      Signed By: [place] MD     March 31, 2022

## 2022-03-31 NOTE — BH NOTES
PSA PART II ADDITIONAL INFORMATION        Access To Fire Arms: No    Substance Use: YES    Last Use: Few days ago    Type of Substance: Alcohol use and  Cocaine use    Frequency of Use: every now and again    Request to See : YES    If yes, notified : YES    Guardian:NO    Guardian Contact: N/A    Release of Information Signed: YES    Release of Information Signed For: Michaela Allen- sister in law    Patient signed treatment plan. Writer attempted to contact Michaela Allen, wrong number. Writer will obtain other numbers from patient.

## 2022-03-31 NOTE — GROUP NOTE
KEANU  GROUP DOCUMENTATION INDIVIDUAL                                                                          Group Therapy Note    Date: 3/31/2022    Group Start Time: 1415  Group End Time: 1500  Group Topic: AA/NA    [facility]    Wilma COLUNGA  GROUP DOCUMENTATION GROUP    Group Therapy Note  The therapist facilitated  a substance abuse group by encouraging the pt's to identify different characteristics of addiction. Attendees: 4           Attendance: Did not attend    Patient's Goal:      Interventions/techniques: Follows Directions:     Interactions:     Mental Status:     Behavior/appearance:     Goals Achieved: Additional Notes: The pt was invited but did not attend.      Roxane Johnson

## 2022-03-31 NOTE — BH NOTES
Pt.is up and visible on unit, affect is bright, appetite good. appearance is fairly neat, denies feeling suicidal or depressed. pleasant upon approach,accepts medications ,pt.denies cravings or withdrawals ,remains on close observation.

## 2022-03-31 NOTE — GROUP NOTE
IP  GROUP DOCUMENTATION INDIVIDUAL                                                                          Group Therapy Note    Date: 3/30/2022    Group Start Time: 1930  Group End Time: 2015  Group Topic: Recreational/Music Therapy    SRM 2  NON ACUTE    Alec Weber    IP [address] [facility] GROUP DOCUMENTATION GROUP    Group Therapy Note    Attendees: 10/12  Facilitated structured group to introduce healthy leisure task skill as positive way to cope and manage stress. Attendance: Attended    Patient's Goal:  STG: Attend activities and groups  Interventions/techniques: Art integration and Supported    Follows Directions: Followed directions    Interactions: Interacted appropriately    Mental Status: Calm and Flat    Behavior/appearance: Attentive and Cooperative    Goals Achieved: Able to engage in interactions and Able to listen to others      Additional Notes: Attended group and actively participated. Offered leisure packet. Declined packet but listened to music with peers. Able to select songs in group. Was receptive to intervention and used time constructively.   Madonna Andersen

## 2022-03-31 NOTE — GROUP NOTE
IP  GROUP DOCUMENTATION INDIVIDUAL                                                                          Group Therapy Note    Date: 3/31/2022    Group Start Time: 5397  Group End Time: 1400  Group Topic: Process Group - Inpatient    SRM 2 [facility] ACUTE    Aline Barrier    IP [address] [facility] GROUP DOCUMENTATION GROUP    Group Therapy Note: Facilitator encouraged group to discuss thoughts and feelings. Provided a handout of emotion emoji's to prompt discussion. Attendees: 4         Attendance: Attended    Patient's Goal:  To attend groups    Interventions/techniques: Validated, Promoted peer support and Supported    Follows Directions: Followed directions    Interactions: Interacted appropriately    Mental Status: Calm and Congruent    Behavior/appearance: Attentive, Cooperative and Motivated    Goals Achieved: Able to engage in interactions, Able to listen to others, Able to self-disclose and Identified relapse prevention strategies      Additional Notes:  Pt shared feeling frustrated when people in his life don't support his desire to stay sober.      John Nuñez

## 2022-03-31 NOTE — BH NOTES
Pt. Is calm and pleasant. Pt. Rested well on this shift and is medication compliant. Denied anxiety, depression, SI/HI, hallucinations or pain. Pt. In no distress respirations regular and unlabored will continue to round closely Q 15 mins per unit protocol.

## 2022-03-31 NOTE — BH NOTES
INITIAL PSYCHIATRIC EVALUATION            IDENTIFICATION:    Patient Name  Brad Rick   Date of Birth 1960   [facility] [id number]   Medical Record Number  [id number]      Age  65 y.o. PCP Dillon Urrutia NP   Admit date:  3/30/2022    Room Number  238/02  @ [facility]   Date of Service  3/31/2022            HISTORY         REASON FOR HOSPITALIZATION:    CC: Major depressive disorder, reported suicidal overdose       HISTORY OF PRESENT ILLNESS:     The patient, Brad Rick, is a 65 y.o. BLACK/ male admitted to the behavioral health floor after patient presented to the emergency department reporting that he has been increasingly depressed and had overdosed on his medications as a suicidal attempt. Patient was unable to provide the triggers leading to his overdose. Patient has a long history of multiple previous psychiatric hospitalizations. As per the initial review of information electronically reported history of overdose on Depakote metoprolol, citalopram, Atrovent statin, risperidone, trazodone. Patient is medically cleared and toxicology cleared as well. Patient during inpatient psychiatric evaluation is very limited insight information is obtained as review of electronic records talking to behavioral health staff and also to the patient. Patient with no active command hallucinations. Patient unable to identify any of the stresses currently bleeding but admits to using cocaine and alcohol regularly and psychosocial stressors. PAST PSYCHIATRIC HISTORY:   Has had previous psychiatric hospitalizations, treatment for major depressive disorder, substance use, bipolar disorder. TRAUMA HISTORY:   Did not share the information     ALLERGIES: No Known Allergies   MEDICATIONS PRIOR TO ADMISSION:   Medications Prior to Admission   Medication Sig    atorvastatin (LIPITOR) 10 mg tablet Take 1 Tablet by mouth nightly.     citalopram (CELEXA) 20 mg tablet Take 1 Tablet by mouth daily.  divalproex DR (DEPAKOTE) 500 mg tablet Take 1 Tablet by mouth two (2) times a day.  metoprolol tartrate (LOPRESSOR) 50 mg tablet Take 1 Tablet by mouth two (2) times a day.  naproxen (NAPROSYN) 500 mg tablet Take 1 Tablet by mouth two (2) times daily (with meals).  risperiDONE (RisperDAL) 2 mg tablet Take 1 Tablet by mouth two (2) times a day.  traZODone (DESYREL) 50 mg tablet Take 1 Tablet by mouth nightly as needed for Sleep (For insomnia). PAST MEDICAL HISTORY:   Past Medical History:   Diagnosis Date    Homicide attempt     Hypercholesterolemia     Hypertension     Psychiatric disorder     [facility]     Seizures ([facility]     [facility]     Suicidal thoughts     TBI [facility]traumatic [facility] injury) [facility]      Past Surgical History:   Procedure Laterality Date    HX NEPHROSTOMY        SOCIAL HISTORY:   Social History     Socioeconomic History    Marital status: SINGLE     Spouse name: Not on file    Number of children: Not on file    Years of education: Not on file    Highest education level: Not on file   Occupational History    Not on file   Tobacco Use    Smoking status: Never Smoker    Smokeless tobacco: Current User   Vaping Use    Vaping Use: Never used   Substance and Sexual Activity    Alcohol use:  Yes     Alcohol/week: 6.0 standard drinks     Types: 6 Cans of beer per week     Comment: 40oz/day    Drug use: Yes     Types: Cocaine     Comment: Last used today    Sexual activity: Not Currently   Other Topics Concern     Service Not Asked    Blood Transfusions Not Asked    Caffeine Concern Not Asked    Occupational Exposure Not Asked    Hobby Hazards Not Asked    Sleep Concern Not Asked    Stress Concern Not Asked    Weight Concern Not Asked    Special Diet Not Asked    Back Care Not Asked    Exercise Not Asked    Bike Helmet Not Asked   [address] Not Asked    Self-Exams Not Asked   Social History Narrative    Not on file     Social Determinants of Health     Financial Resource Strain:     Difficulty of Paying Living Expenses: Not on file   Food Insecurity:     Worried About Running Out of Food in the Last Year: Not on file    Evette of Food in the Last Year: Not on file   Transportation Needs:     Lack of Transportation (Medical): Not on file    Lack of Transportation (Non-Medical): Not on file   Physical Activity:     Days of Exercise per Week: Not on file    Minutes of Exercise per Session: Not on file   Stress:     Feeling of Stress : Not on file   Social Connections:     Frequency of Communication with Friends and Family: Not on file    Frequency of Social Gatherings with Friends and Family: Not on file    Attends [employer] Services: Not on file    Active Member of [address] [place] [address] [employer] or Organizations: Not on file    Attends Club or Organization Meetings: Not on file    Marital Status: Not on file   Intimate Partner Violence:     Fear of Current or Ex-Partner: Not on file    Emotionally Abused: Not on file    Physically Abused: Not on file    Sexually Abused: Not on file   Housing Stability:     Unable to Pay for Housing in the Last Year: Not on file    Number of [place] in the Last Year: Not on file    Unstable Housing in the Last Year: Not on file      FAMILY HISTORY: History reviewed. No pertinent family history. Family History   Problem Relation Age of Onset    Hypertension Father        REVIEW OF SYSTEMS:   ROS  Pertinent items are noted in the History of Present Illness. All other Systems reviewed and are considered negative. MENTAL STATUS EXAM & VITALS     MENTAL STATUS EXAM (MSE):    General Presentation age appropriate and disheveled, cooperative   Orientation Alert and Oriented x 2   Vital Signs  See below (reviewed 3/31/2022); Vital Signs (BP, Pulse, & Temp) are within normal limits if not listed below.    Gait and Station Stable/steady, no ataxia   Musculoskeletal System No extrapyramidal symptoms (EPS); no abnormal muscular movements or Tardive Dyskinesia (TD); muscle strength and tone are within normal limits   Language No aphasia or dysarthria   Speech:  soft   Thought Processes Not logical; slow rate of thoughts; poor abstract reasoning/computation   Thought Associations tangential   Thought Content free of hallucinations   Suicidal Ideations plan   Homicidal Ideations plan   Mood:  depressed   Affect:  depressed   Memory recent  impaired   Memory remote:  impaired   Concentration/Attention:  distractable   Fund of Knowledge below average   Insight:  impaired    Reliability limited   Judgment:  impaired          VITALS:     Patient Vitals for the past 24 hrs:   Temp Pulse Resp BP SpO2   03/31/22 0753 98.7 °F (37.1 °C) 76 18 112/71 --   03/30/22 1938 98.6 °F (37 °C) 83 18 121/72 100 %   03/30/22 1830 97.5 °F (36.4 °C) 69 16 120/83 --   03/30/22 1700 -- 69 16 120/83 97 %   03/30/22 1600 -- 70 -- (!) 120/90 98 %   03/30/22 1313 -- 77 16 103/77 98 %     Wt Readings from Last 3 Encounters:   03/30/22 59 kg (130 lb)   03/16/22 59 kg (130 lb)   01/30/22 61.2 kg (135 lb)     Temp Readings from Last 3 Encounters:   03/31/22 98.7 °F (37.1 °C)   03/21/22 98.3 °F (36.8 °C)   01/30/22 98.5 °F (36.9 °C)     BP Readings from Last 3 Encounters:   03/31/22 112/71   03/21/22 115/69   01/30/22 131/81     Pulse Readings from Last 3 Encounters:   03/31/22 76   03/21/22 76   01/30/22 88            DATA     LABORATORY DATA:  Labs Reviewed   CBC WITH AUTOMATED DIFF - Abnormal; Notable for the following components:       Result Value    WBC 3.8 (*)     RBC 4.08 (*)     HGB 11.3 (*)     HCT 35.3 (*)     RDW 15.5 (*)     ABS.  NEUTROPHILS 1.5 (*)     All other components within normal limits   METABOLIC PANEL, COMPREHENSIVE - Abnormal; Notable for the following components:    Chloride 117 (*)     Glucose 104 (*)     BUN/Creatinine ratio 8 (*)     Calcium 8.2 (*)     Albumin 3.2 (*)     A-G Ratio 0.9 (*)     All other components within normal limits   SALICYLATE - Abnormal; Notable for the following components:    Salicylate level <2.2 (*)     All other components within normal limits   ACETAMINOPHEN - Abnormal; Notable for the following components:    Acetaminophen level <10 (*)     All other components within normal limits   DRUG SCREEN, URINE - Abnormal; Notable for the following components:    COCAINE Positive (*)     All other components within normal limits   ETHYL ALCOHOL - Abnormal; Notable for the following components:    ALCOHOL(ETHYL),SERUM 185 (*)     All other components within normal limits   VALPROIC ACID - Abnormal; Notable for the following components:    Valproic acid 15 (*)     All other components within normal limits   VALPROIC ACID - Abnormal; Notable for the following components:    Valproic acid 41 (*)     All other components within normal limits   ETHYL ALCOHOL - Abnormal; Notable for the following components:    ALCOHOL(ETHYL),SERUM 60 (*)     All other components within normal limits   COVID-19 WITH INFLUENZA A/B   URINALYSIS W/ REFLEX CULTURE   MAGNESIUM   TROPONIN-HIGH SENSITIVITY     Admission on 03/30/2022   Component Date Value Ref Range Status    WBC 03/30/2022 3.8* 4.1 - 11.1 K/uL Final    RBC 03/30/2022 4.08* 4.10 - 5.70 M/uL Final    HGB 03/30/2022 11.3* 12.1 - 17.0 g/dL Final    HCT 03/30/2022 35.3* 36.6 - 50.3 % Final    MCV 03/30/2022 86.5  80.0 - 99.0 FL Final    MCH 03/30/2022 27.7  26.0 - 34.0 PG Final    MCHC 03/30/2022 32.0  30.0 - 36.5 g/dL Final    RDW 03/30/2022 15.5* 11.5 - 14.5 % Final    PLATELET 27/18/7576 684  150 - 400 K/uL Final    MPV 03/30/2022 9.2  8.9 - 12.9 FL Final    NRBC 03/30/2022 0.0  0.0  WBC Final    ABSOLUTE NRBC 03/30/2022 0.00  0.00 - 0.01 K/uL Final    NEUTROPHILS 03/30/2022 39  32 - 75 % Final    LYMPHOCYTES 03/30/2022 47  12 - 49 % Final    MONOCYTES 03/30/2022 12  5 - 13 % Final    EOSINOPHILS 03/30/2022 1  0 - 7 % Final    BASOPHILS 03/30/2022 1  0 - 1 % Final    IMMATURE GRANULOCYTES 03/30/2022 0  0 - 0.5 % Final    ABS. NEUTROPHILS 03/30/2022 1.5* 1.8 - 8.0 K/UL Final    ABS. LYMPHOCYTES 03/30/2022 1.8  0.8 - 3.5 K/UL Final    ABS. MONOCYTES 03/30/2022 0.4  0.0 - 1.0 K/UL Final    ABS. EOSINOPHILS 03/30/2022 0.0  0.0 - 0.4 K/UL Final    ABS. BASOPHILS 03/30/2022 0.0  0.0 - 0.1 K/UL Final    ABS. IMM. GRANS. 03/30/2022 0.0  0.00 - 0.04 K/UL Final    DF 03/30/2022 AUTOMATED    Final    Sodium 03/30/2022 145  136 - 145 mmol/L Final    Potassium 03/30/2022 4.0  3.5 - 5.1 mmol/L Final    Chloride 03/30/2022 117* 97 - 108 mmol/L Final    CO2 03/30/2022 22  21 - 32 mmol/L Final    Anion gap 03/30/2022 6  5 - 15 mmol/L Final    Glucose 03/30/2022 104* 65 - 100 mg/dL Final    BUN 03/30/2022 8  6 - 20 mg/dL Final    Creatinine 03/30/2022 1.00  0.70 - 1.30 mg/dL Final    BUN/Creatinine ratio 03/30/2022 8* 12 - 20   Final    GFR est AA 03/30/2022 >60  >60 ml/min/1.73m2 Final    GFR est non-AA 03/30/2022 >60  >60 ml/min/1.73m2 Final    Calcium 03/30/2022 8.2* 8.5 - 10.1 mg/dL Final    Bilirubin, total 03/30/2022 0.4  0.2 - 1.0 mg/dL Final    AST (SGOT) 03/30/2022 21  15 - 37 U/L Final    ALT (SGPT) 03/30/2022 22  12 - 78 U/L Final    Alk.  phosphatase 03/30/2022 55  45 - 117 U/L Final    Protein, total 03/30/2022 6.6  6.4 - 8.2 g/dL Final    Albumin 03/30/2022 3.2* 3.5 - 5.0 g/dL Final    Globulin 03/30/2022 3.4  2.0 - 4.0 g/dL Final    A-G Ratio 03/30/2022 0.9* 1.1 - 2.2   Final    Salicylate level 42/63/9700 <1.7* 2.8 - 20.0 mg/dL Final    Acetaminophen level 03/30/2022 <10* 10 - 30 ug/mL Final    Color 03/30/2022 Yellow/Straw    Final    Appearance 03/30/2022 Clear  Clear   Final    Specific gravity 03/30/2022 1.005  1.003 - 1.030   Final    pH (UA) 03/30/2022 6.0  5.0 - 8.0   Final    Protein 03/30/2022 Negative  Negative mg/dL Final    Glucose 03/30/2022 Negative  Negative mg/dL Final    Ketone 03/30/2022 Negative  Negative mg/dL Final    Bilirubin 03/30/2022 Negative  Negative   Final    Blood 03/30/2022 Negative  Negative   Final    Urobilinogen 03/30/2022 0.1  0.1 - 1.0 EU/dL Final    Nitrites 03/30/2022 Negative  Negative   Final    Leukocyte Esterase 03/30/2022 Negative  Negative   Final    UA:UC IF INDICATED 03/30/2022 Culture not indicated by UA result  Culture not indicated by UA result   Final    WBC 03/30/2022 0-4  0 - 4 /hpf Final    RBC 03/30/2022 0-5  0 - 5 /hpf Final    Bacteria 03/30/2022 Negative  Negative /hpf Final    AMPHETAMINES 03/30/2022 Negative  Negative   Final    BARBITURATES 03/30/2022 Negative  Negative   Final    BENZODIAZEPINES 03/30/2022 Negative  Negative   Final    COCAINE 03/30/2022 Positive* Negative   Final    METHADONE 03/30/2022 Negative  Negative   Final    OPIATES 03/30/2022 Negative  Negative   Final    PCP(PHENCYCLIDINE) 03/30/2022 Negative  Negative   Final    THC (TH-CANNABINOL) 03/30/2022 Negative  Negative   Final    Drug screen comment 03/30/2022 This test is a screen for drugs of abuse in a medical setting only (i.e., they are unconfirmed results and as such must not be used for non-medical purposes, e.g.,employment testing, legal testing). Due to its inherent nature, false positive (FP) and false negative (FN) results may be obtained. Therefore, if necessary for medical care, recommend confirmation of positive findings by GC/MS.     Final    ALCOHOL(ETHYL),SERUM 03/30/2022 185* <10 mg/dL Final    Ventricular Rate 03/30/2022 71  BPM Final    Atrial Rate 03/30/2022 71  BPM Final    P-R Interval 03/30/2022 162  ms Final    QRS Duration 03/30/2022 96  ms Final    Q-T Interval 03/30/2022 452  ms Final    QTC Calculation (Bezet) 03/30/2022 491  ms Final    Calculated P Axis 03/30/2022 71  degrees Final    Calculated R Axis 03/30/2022 30  degrees Final    Calculated T Axis 03/30/2022 27  degrees Final    Diagnosis 03/30/2022    Final Value:Normal sinus rhythm  Prolonged QT  Abnormal ECG  When compared with ECG of 16-DEC-2021 02:45,  Nonspecific T wave abnormality no longer evident in Anterior leads  Confirmed by MERCED Osorio ([id number]) on 3/30/2022 7:31:37 AM      Magnesium 03/30/2022 1.6  1.6 - 2.4 mg/dL Final    Troponin-High Sensitivity 03/30/2022 10  0 - 76 ng/L Final    Valproic acid 03/30/2022 15* 50 - 100 ug/mL Final    Valproic acid 03/30/2022 41* 50 - 100 ug/mL Final    ALCOHOL(ETHYL),SERUM 03/30/2022 60* <10 mg/dL Final    SARS-CoV-2 by PCR 03/30/2022 Not Detected  Not Detected   Final    Influenza A by PCR 03/30/2022 Not Detected  Not Detected   Final    Influenza B by PCR 03/30/2022 Not Detected  Not Detected   Final    Ventricular Rate 03/30/2022 75  BPM Final    Atrial Rate 03/30/2022 75  BPM Final    P-R Interval 03/30/2022 152  ms Final    QRS Duration 03/30/2022 82  ms Final    Q-T Interval 03/30/2022 420  ms Final    QTC Calculation (Bezet) 03/30/2022 469  ms Final    Calculated P Axis 03/30/2022 58  degrees Final    Calculated R Axis 03/30/2022 16  degrees Final    Calculated T Axis 03/30/2022 27  degrees Final    Diagnosis 03/30/2022    Final                    Value:Normal sinus rhythm  Normal ECG  When compared with ECG of 30-MAR-2022 05:57,  No significant change was found  Confirmed by Geovani Delgadillo ([id number]) on 3/30/2022 5:27:56 PM          RADIOLOGY REPORTS:  Results from Hospital Encounter encounter on 07/12/21    XR TIB/FIB LT    Narrative  Study: XR ANKLE LT MIN 3 V, XR TIB/FIB LT    Clinical indication: pain, surgical hx    Comparison: None. Impression  Findings/impression:    3 views of the left ankle, 4 views of the left tibia/fibula. Status post ORIF of distal tibial diaphyseal fracture. Fracture is healed. There  is chronic mild apex medial angulation of the distal tibia and fibula. No  evidence of acute fracture. Articular alignment is anatomic.     Ankle mortise is intact without evidence of widening. No results found.            MEDICATIONS       ALL MEDICATIONS  Current Facility-Administered Medications   Medication Dose Route Frequency    traZODone (DESYREL) tablet 50 mg  50 mg Oral QHS PRN    magnesium hydroxide (MILK OF MAGNESIA) 400 mg/5 mL oral suspension 30 mL  30 mL Oral DAILY PRN    atorvastatin (LIPITOR) tablet 10 mg  10 mg Oral QHS    traZODone (DESYREL) tablet 50 mg  50 mg Oral QHS    risperiDONE (RisperDAL) tablet 2 mg  2 mg Oral BID    divalproex DR (DEPAKOTE) tablet 500 mg  500 mg Oral BID    metoprolol tartrate (LOPRESSOR) tablet 50 mg  50 mg Oral BID    citalopram (CELEXA) tablet 20 mg  20 mg Oral DAILY      SCHEDULED MEDICATIONS  Current Facility-Administered Medications   Medication Dose Route Frequency    atorvastatin (LIPITOR) tablet 10 mg  10 mg Oral QHS    traZODone (DESYREL) tablet 50 mg  50 mg Oral QHS    risperiDONE (RisperDAL) tablet 2 mg  2 mg Oral BID    divalproex DR (DEPAKOTE) tablet 500 mg  500 mg Oral BID    metoprolol tartrate (LOPRESSOR) tablet 50 mg  50 mg Oral BID    citalopram (CELEXA) tablet 20 mg  20 mg Oral DAILY                ASSESSMENT & PLAN        The patient, Clara Espinosa, is a 65 y.o.  male who presents at this time for treatment of the following diagnoses:  Patient Active Hospital Problem List:    Major depressive disorder, moderate recurrent with reported suicidal overdose  Cocaine abuse  Alcohol abuse/dependence           A coordinated, multidisplinary treatment team (includes the nurse,  and writer) round was conducted for this initial evaluation       Current Facility-Administered Medications:     traZODone (DESYREL) tablet 50 mg, 50 mg, Oral, QHS PRN, Kaya Hernandez MD    magnesium hydroxide (MILK OF MAGNESIA) 400 mg/5 mL oral suspension 30 mL, 30 mL, Oral, DAILY PRN, Cheryl Garcia MD    atorvastatin (LIPITOR) tablet 10 mg, 10 mg, Oral, QHS, Cheryl Garcia MD, 10 mg at 03/30/22 2106    traZODone (DESYREL) tablet 50 mg, 50 mg, Oral, QHS, Cheryl Garcia MD, 50 mg at 03/30/22 2106    risperiDONE (RisperDAL) tablet 2 mg, 2 mg, Oral, BID, Cheryl Garcia MD, 2 mg at 03/31/22 0819    divalproex DR (DEPAKOTE) tablet 500 mg, 500 mg, Oral, BID, Cheryl Garcia MD, 500 mg at 03/31/22 0819    metoprolol tartrate (LOPRESSOR) tablet 50 mg, 50 mg, Oral, BID, Cheryl Garcia MD, 50 mg at 03/31/22 0819    citalopram (CELEXA) tablet 20 mg, 20 mg, Oral, DAILY, Cheryl Garcia MD, 20 mg at 03/31/22 6737    Discussed risks and benefits of the proposed medication. Patient was given the opportunity to ask questions. Informed consent given to the use of the above medications. Continue to adjust psychiatric and non-psychiatric medications as deemed appropriate & based upon diagnoses and response to treatment. Reviewed admission labs and medical tests in the EHR     Reviewed old psychiatric and medical records available in the EHR. Gather additional collateral information from family and o/p treatment team to further elucidate the nature of patient's psychopathology and baselline level of psychiatric functioning. Placed on close observation, for safety    Patient to engage in individual therapy, group therapy support group, psychoeducational group, safety planning. Patient continue to address stressors that led to hospitalization. Patient was discussed regarding medications, benefits risks side effects alternatives and patient agreeable in considering current medications. Strengths-patient able to express self, average to low average intelligence, has family support. Weakness-poor coping, comorbid substance abuse, psychosocial stressors    Discharge Criteria-  Patient is able to show progress and improvement in neurovegetative symptoms of depression. Patient is no longer actively suicidal or homicidal and has no command hallucinations. Patient  is able to present with healthy ways to cope with current stressors. ESTIMATED LENGTH OF STAY:    5 to 7 days                              SIGNED:    John Millan MD  3/31/2022

## 2022-04-01 PROCEDURE — 74011250637 HC RX REV CODE- 250/637: Performed by: PSYCHIATRY & NEUROLOGY

## 2022-04-01 PROCEDURE — 65220000003 HC RM SEMIPRIVATE PSYCH

## 2022-04-01 RX ADMIN — CITALOPRAM HYDROBROMIDE 20 MG: 20 TABLET ORAL at 08:51

## 2022-04-01 RX ADMIN — METOPROLOL TARTRATE 50 MG: 50 TABLET, FILM COATED ORAL at 21:03

## 2022-04-01 RX ADMIN — DIVALPROEX SODIUM 500 MG: 500 TABLET, DELAYED RELEASE ORAL at 08:51

## 2022-04-01 RX ADMIN — METOPROLOL TARTRATE 50 MG: 50 TABLET, FILM COATED ORAL at 08:51

## 2022-04-01 RX ADMIN — DIVALPROEX SODIUM 500 MG: 500 TABLET, DELAYED RELEASE ORAL at 21:04

## 2022-04-01 RX ADMIN — ATORVASTATIN CALCIUM 10 MG: 10 TABLET, FILM COATED ORAL at 21:04

## 2022-04-01 RX ADMIN — RISPERIDONE 2 MG: 2 TABLET ORAL at 21:04

## 2022-04-01 RX ADMIN — TRAZODONE HYDROCHLORIDE 50 MG: 50 TABLET ORAL at 21:04

## 2022-04-01 RX ADMIN — RISPERIDONE 2 MG: 2 TABLET ORAL at 08:51

## 2022-04-01 NOTE — BH NOTES
DX: Depression and SA (Cocaine)    Affect full. Smiling. Compliant with scheduled meds. Denied feeling depressed, anxious, and/or suicidal. Denied a/v hallucinations. Consumed 100% of bfkt and lunch, in the day room, with peers. Frequently approached staff and requested a phone number to be dialed. Wearing hospital gowns. Encouraged to shower and change his gowns. Q 15 mins checks maintained, for safety.

## 2022-04-01 NOTE — GROUP NOTE
[facility] GROUP DOCUMENTATION INDIVIDUAL                                                                          Group Therapy Note    Date: 4/1/2022    Group Start Time: 1115  Group End Time: 1200  Group Topic: Education Group - Inpatient    SRM 2  NON ACUTE    Laura Vimal    IP [facility] GROUP DOCUMENTATION GROUP    Group Therapy Note    Facilitated discussion focused on defining and recognizing examples of different automatic negative thoughts and how they affect moods and behaviors    Attendees: 6/11         Attendance: Attended    Patient's Goal:  Attend group daily     Interventions/techniques: Informed and Supported    Follows Directions: Followed directions    Interactions: Interacted appropriately    Mental Status: Calm    Behavior/appearance: Cooperative and Needed prompting    Goals Achieved: Able to engage in interactions and Able to listen to others      Additional Notes:  Receptive to information discussed. Acknowledge he was thinking negative prior to admission.      Felipa Finch, CTRS

## 2022-04-01 NOTE — GROUP NOTE
KEANU  GROUP DOCUMENTATION INDIVIDUAL                                                                          Group Therapy Note    Date: 4/1/2022    Group Start Time: 1045  Group End Time: 1100  Group Topic: Nursing    SRM 2  NON ACUTE    Williams Shane LPN    [facility] GROUP DOCUMENTATION GROUP    Group Therapy Note    Attendees: 5/11         Attendance: Did not attend    Patient's Goal: Benefits & facts about medications    Interventions/techniques: Follows Directions:     Interactions:     Mental Status:     Behavior/appearance:     Goals Achieved: Additional Notes:  Pt.invited he did not attend.     Spencer Malhotra LPN

## 2022-04-01 NOTE — PROGRESS NOTES
Problem: Suicide  Goal: *STG: Remains safe in hospital  Outcome: Progressing Towards Goal  Goal: *STG/LTG: Complies with medication therapy  Outcome: Progressing Towards Goal     Patient denied SI/HI. He denied anxiety and depression. Pt is pleasant and cooperative, and medication compliant. No distress or behavioral incidents observed. Will continue to monitor for safety Q 15 min.

## 2022-04-01 NOTE — BH NOTES
Behavioral Health Treatment Team Note     Patient goal(s) for today: Pt reports \"groups and groups\"  Treatment team focus/goals: Medication management, safe discharge planning, attend groups daily    Progress note: Pt was observed walking the unit. Pt displayed a bright affect and was pleasant with writer on approach. Pt was oriented x4 and denies any current SI, HI, AVH. Pt continues to state that he would consider inpatient rehab but expressed interest in PHP. Pt reports that he enjoys coming to the groups. Writer inquired about additional phone numbers for collateral. Pt stated he would look into additional phone numbers. An inpatient level of care is necessary in order to stabilize the pt on further medications and to establish a safe discharge plan to avoid relapse. LOS:  2  Expected LOS: 5-7 days    Insurance info/prescription coverage:  [address] Medicare HMO  Date of last family contact:  Writer attempted to contact sister-in-law, incorrect numbers  Family requesting physician contact today:  no  Discharge plan:   To be established prior to [facility] in the home:  no   Outpatient provider(s):  None at this time, [facility] [place] [facility] [place]    Participating treatment team members: Sierra Baker, * (assigned SW), Lili Broseley, [facility]

## 2022-04-01 NOTE — GROUP NOTE
KEANU  GROUP DOCUMENTATION INDIVIDUAL                                                                          Group Therapy Note    Date: 4/1/2022    Group Start Time: 5639  Group End Time: 1400  Group Topic: Process Group - Inpatient    SRM 2 BEHA TH ACUTE    NeerajPippa    IP [address] [facility] GROUP DOCUMENTATION GROUP    Group Therapy Note    Attendees: 5/10    Writer facilitated a processing group with patients. Writer encouraged patients to express feelings and discuss coping and discharge plans. Writer then had patients choose dates and a significant quote from a book and encouraged them to share significance of the date and the quote. Writer concluded session by having patients discuss their feelings regarding the group and encourage peer support and feedback. Attendance: Did not attend    Additional Notes:  Pt was invited and encouraged to attend group but chose not to attend.     [facility]

## 2022-04-01 NOTE — GROUP NOTE
KEANU  GROUP DOCUMENTATION INDIVIDUAL                                                                          Group Therapy Note    Date: 4/1/2022    Group Start Time: 1927  Group End Time: 7684  Group Topic: [employer]    SRM 2 BH NON ACUTE    Randa Osullivan    IP [address]150 [facility] GROUP DOCUMENTATION GROUP    Group Therapy Note    Attendees: 3         Attendance: Did not attend    Patient's Goal:    Interventions/techniques: Follows Directions:     Interactions:     Mental Status:     Behavior/appearance:     Goals Achieved: Additional Notes:  Pt was encouraged to attend group but chose not to attend.     [facility] [employer]

## 2022-04-01 NOTE — PROGRESS NOTES
65years old history of seizure substance abuse etc. thoughts traumatic brain injury  Visit Vitals  /77   Pulse 61   Temp 97.9 °F (36.6 °C)   Resp 18   Ht 5' 9\" (1.753 m)   Wt 59 kg (130 lb)   SpO2 100%   BMI 19.20 kg/m²     No current facility-administered medications on file prior to encounter. Current Outpatient Medications on File Prior to Encounter   Medication Sig Dispense Refill    atorvastatin (LIPITOR) 10 mg tablet Take 1 Tablet by mouth nightly. 30 Tablet 0    citalopram (CELEXA) 20 mg tablet Take 1 Tablet by mouth daily. 30 Tablet 1    divalproex DR (DEPAKOTE) 500 mg tablet Take 1 Tablet by mouth two (2) times a day. 60 Tablet 1    metoprolol tartrate (LOPRESSOR) 50 mg tablet Take 1 Tablet by mouth two (2) times a day. 60 Tablet 1    naproxen (NAPROSYN) 500 mg tablet Take 1 Tablet by mouth two (2) times daily (with meals). 30 Tablet 0    risperiDONE (RisperDAL) 2 mg tablet Take 1 Tablet by mouth two (2) times a day. 60 Tablet 1    traZODone (DESYREL) 50 mg tablet Take 1 Tablet by mouth nightly as needed for Sleep (For insomnia).  30 Tablet 1

## 2022-04-02 PROCEDURE — 65220000003 HC RM SEMIPRIVATE PSYCH

## 2022-04-02 PROCEDURE — 74011250637 HC RX REV CODE- 250/637: Performed by: PSYCHIATRY & NEUROLOGY

## 2022-04-02 RX ADMIN — DIVALPROEX SODIUM 500 MG: 500 TABLET, DELAYED RELEASE ORAL at 08:58

## 2022-04-02 RX ADMIN — METOPROLOL TARTRATE 50 MG: 50 TABLET, FILM COATED ORAL at 21:13

## 2022-04-02 RX ADMIN — CITALOPRAM HYDROBROMIDE 20 MG: 20 TABLET ORAL at 08:58

## 2022-04-02 RX ADMIN — TRAZODONE HYDROCHLORIDE 50 MG: 50 TABLET ORAL at 21:15

## 2022-04-02 RX ADMIN — ATORVASTATIN CALCIUM 10 MG: 10 TABLET, FILM COATED ORAL at 21:14

## 2022-04-02 RX ADMIN — RISPERIDONE 2 MG: 2 TABLET ORAL at 21:13

## 2022-04-02 RX ADMIN — METOPROLOL TARTRATE 50 MG: 50 TABLET, FILM COATED ORAL at 08:58

## 2022-04-02 RX ADMIN — RISPERIDONE 2 MG: 2 TABLET ORAL at 08:58

## 2022-04-02 RX ADMIN — DIVALPROEX SODIUM 500 MG: 500 TABLET, DELAYED RELEASE ORAL at 21:13

## 2022-04-02 NOTE — GROUP NOTE
[facility] GROUP DOCUMENTATION INDIVIDUAL                                                                          Group Therapy Note    Date: 4/2/2022    Group Start Time: 0930  Group End Time: 0945  Group Topic: [employer] Meeting     Ashley Hutchinson, PRINCE    IP [address] [facility] [address] GROUP DOCUMENTATION GROUP    Group Therapy Note    Attendees:          Attendance: Did not attend    Patient's Goal:      Interventions/techniques: Follows Directions:     Interactions:     Mental Status:     Behavior/appearance:     Goals Achieved:        Additional Notes:  Pt invited to attend but declined    Paul Benjamin RN

## 2022-04-02 NOTE — PROGRESS NOTES
Progress Note  Date:2022       Room:[facility]  Patient Name:Félix Fletcher     YOB: 1960     Age:62 y.o. Subjective    Subjective   Patient presents mostly isolated to himself limited conversation. Denies having any auditory visual hallucinations denies having any active withdrawals. Admits to feeling depressed. Denies having any active suicidal ideations. Mental status examination-patient is alert, oriented to name place person depressed mood flat affect no active SI or HI voiced. Insight judgment coping remains impaired  Review of Systems  Objective         Vitals Last 24 Hours:  TEMPERATURE:  Temp  Av.4 °F (36.9 °C)  Min: 97.9 °F (36.6 °C)  Max: 98.8 °F (37.1 °C)  RESPIRATIONS RANGE: Resp  Av  Min: 16  Max: 18  PULSE OXIMETRY RANGE: SpO2  Av %  Min: 99 %  Max: 99 %  PULSE RANGE: Pulse  Av  Min: 61  Max: 70  BLOOD PRESSURE RANGE: Systolic (23KHV), QEM:578 , Min:110 , ZJQ:522   ; Diastolic (79FUD), KWT:49, Min:68, Max:85    I/O (24Hr): No intake or output data in the 24 hours ending 22  Objective  Labs/Imaging/Diagnostics    Labs:  CBC:Recent Labs     22  0559   WBC 3.8*   RBC 4.08*   HGB 11.3*   HCT 35.3*   MCV 86.5   RDW 15.5*        CHEMISTRIES:  Recent Labs     22  0602 22  0559   NA  --  145   K  --  4.0   CL  --  117*   CO2  --  22   BUN  --  8   CA  --  8.2*   MG 1.6  --    PT/INR:No results for input(s): INR, INREXT in the last 72 hours. No lab exists for component: PROTIME  APTT:No results for input(s): APTT in the last 72 hours. LIVER PROFILE:  Recent Labs     22  0559   AST 21   ALT 22     Lab Results   Component Value Date/Time    ALT (SGPT) 2022 05:59 AM    AST (SGOT) 21 2022 05:59 AM    Alk. phosphatase 55 2022 05:59 AM    Bilirubin, total 0.4 2022 05:59 AM       Imaging Last 24 Hours:  No results found.   Assessment//Plan   Active Problems: Substance abuse ([facility] [address] (3/16/2022)      Suicidal ideations (3/16/2022)      MDD (major depressive disorder) (3/30/2022)      Assessment & Plan  Encouraged participation in group therapy  Will obtain Depakote level  Continue current medication  Family meeting to be scheduled  Discussed inpatient rehab patient wants to do outpatient treatment.       Current Facility-Administered Medications:     traZODone (DESYREL) tablet 50 mg, 50 mg, Oral, QHS PRN, Cheryl Garcia MD    magnesium hydroxide (MILK OF MAGNESIA) 400 mg/5 mL oral suspension 30 mL, 30 mL, Oral, DAILY PRN, Cheryl Garcia MD    atorvastatin (LIPITOR) tablet 10 mg, 10 mg, Oral, QHS, Cheryl Garcia MD, 10 mg at 04/03/22 2117    traZODone (DESYREL) tablet 50 mg, 50 mg, Oral, QHS, Cheryl Garcia MD, 50 mg at 04/03/22 2117    risperiDONE (RisperDAL) tablet 2 mg, 2 mg, Oral, BID, Cheryl Garcia MD, 2 mg at 04/04/22 0834    divalproex DR (DEPAKOTE) tablet 500 mg, 500 mg, Oral, BID, Cheryl Garcia MD, 500 mg at 04/04/22 0834    metoprolol tartrate (LOPRESSOR) tablet 50 mg, 50 mg, Oral, BID, Cheryl Garcia MD, 50 mg at 04/04/22 0834    citalopram (CELEXA) tablet 20 mg, 20 mg, Oral, DAILY, Cheryl Garcia MD, 20 mg at 04/04/22 8582  Electronically signed by Ruddy Nicholson MD on 4/1/2022 at 11:02 PM

## 2022-04-02 NOTE — PROGRESS NOTES
Problem: Depressed Mood (Adult/Pediatric)  Goal: *STG: Attends activities and groups  Outcome: Progressing Towards Goal   Patient attends all PSR and states he enjoys all the groups.

## 2022-04-02 NOTE — BH NOTES
Pt.has been lying down in bed most of shift,affect is flat, pleasant upon approach,denies feeling suicidal, isolates to self, accepts medication and meals, insight and judgement is limited,denies ETOH, and drug usage. pt.states he is considering substance abuse rehab.no concerns voiced, remains on close observation.

## 2022-04-02 NOTE — BH NOTES
Behavioral Health Treatment Team Note     Patient goal(s) for today: Pt reports, \"go to a meeting. \"  Treatment team focus/goals: Medication management, safe discharge planning, attend groups daily    Progress note: Pt was observed in the hallway walking the unit. Pt displayed a bright affect and was pleasant when writer approached. Pt was oriented x4 and denies any current SI, HI, AVH. Pt inquired about discharge date but does not want to push. Pt states that he would consider going to [facility] in [place]. Pt continues to state that he would like to be signed up for PHP. An inpatient level of care is necessary in order to stabilize the pt on further medications and to establish a safe discharge plan as well as to avoid relapse. LOS:  3  Expected LOS: 5-7 days    Insurance info/prescription coverage:  [employer] Medicare HMO  Date of last family contact:  Writer attempted to contact sister-in-law 04/01, incorrect numbers  Family requesting physician contact today:  no  Discharge plan:   To be established prior to discharge   Guns in the home:  no   Outpatient provider(s):  None at this time, [facility]    Participating treatment team members: Abbi Jessica, * (assigned SW), Radha Echevarria, [facility]

## 2022-04-02 NOTE — BH NOTES
Patient up and busy in the mileau. Ambulates around the unit. Calm and pleasant. Cooperative. Affect-happy. Mood-smiling. Denies SI,HI and AVH's. Attends all PSR. \"I enjoy going to all of my groups\" Patient states he had to come back-\"I missed all of you\". \"I feel safe here\". Med-diet compliant. Participates in snack time. Patient stays busy in the dayroom conversing with other unit peers,watching TV and doing other various activities. Denies pain. Comfortable.  VSS

## 2022-04-02 NOTE — GROUP NOTE
IP  GROUP DOCUMENTATION INDIVIDUAL                                                                          Group Therapy Note    Date: 4/2/2022    Group Start Time: 1115  Group End Time: 1200  Group Topic: Education Group - Inpatient    SRM 2  NON ACUTE    Leandra Hunter    IP [address] [facility] GROUP DOCUMENTATION GROUP    Group Therapy Note    Attendees: 2  Facilitated structured group discussion to identify strengths, positive attributes and to identify positive affirmations used to increase self-esteem and coping with stressors. Attendance: Attended    Patient's Goal:  STG: Attends activities and groups      Interventions/techniques: Informed, Provide feedback and Supported    Follows Directions: Followed directions    Interactions: Interacted appropriately    Mental Status: Calm and Flat    Behavior/appearance: Cooperative    Goals Achieved: Able to engage in interactions and Able to listen to others      Additional Notes: Attended group. Received materials provided during group. Was receptive to intervention and was able to identify personal strengths and positive affirmations. Acknowledged importance of identifying personal strengths and qualities to improve self-esteem and to assist in managing stressors. PT stated he is humble and values his family.      Mendy Petersen, FRANKS

## 2022-04-02 NOTE — PROGRESS NOTES
Progress Note  Date:2022       Room:[facility]  Patient Name:Félix Fletcher     YOB: 1960     Age:62 y.o. Subjective    Subjective   Review of Systems  Objective         Vitals Last 24 Hours:  TEMPERATURE:  Temp  Av.7 °F (37.1 °C)  Min: 98.6 °F (37 °C)  Max: 98.8 °F (37.1 °C)  RESPIRATIONS RANGE: Resp  Av  Min: 16  Max: 18  PULSE OXIMETRY RANGE: SpO2  Av.5 %  Min: 99 %  Max: 100 %  PULSE RANGE: Pulse  Av.5  Min: 71  Max: 70  BLOOD PRESSURE RANGE: Systolic (57LJE), DVV:841 , Min:110 , DSA:950   ; Diastolic (63FGF), IXN:69, Min:68, Max:85    I/O (24Hr): No intake or output data in the 24 hours ending 22 1238  Objective  Labs/Imaging/Diagnostics    Labs:  CBC:No results for input(s): WBC, RBC, HGB, HCT, MCV, RDW, PLT, HGBEXT, HCTEXT, PLTEXT in the last 72 hours. CHEMISTRIES:No results for input(s): NA, K, CL, CO2, BUN, CA, PHOS, MG in the last 72 hours. No lab exists for component: CREATININE, GLUCOSEPT/INR:No results for input(s): INR, INREXT in the last 72 hours. No lab exists for component: PROTIME  APTT:No results for input(s): APTT in the last 72 hours. LIVER PROFILE:No results for input(s): AST, ALT in the last 72 hours. No lab exists for component: [place], ALKPHOS  Lab Results   Component Value Date/Time    ALT (SGPT) 22 2022 05:59 AM    AST (SGOT) 21 2022 05:59 AM    Alk. phosphatase 55 2022 05:59 AM    Bilirubin, total 0.4 2022 05:59 AM       Imaging Last 24 Hours:  No results found.   Assessment//Plan   Active Problems:    Substance abuse ([address] (3/16/2022)      Suicidal ideations (3/16/2022)      MDD (major depressive disorder) (3/30/2022)      Assessment & Plan patient seen for follow-up chart review patient out in the hallway walking and making telephone calls alert verbal polite cooperative he announced that he would like to go home today the gentleman just left recently and came back on March 30 there is a part of the substance abuse issues and team has not made any plans for his discharge they are looking into inpatient substance abuse rehabilitation and is not stable enough to be discharged discharge to community is not an appropriate continued inpatient level of care indicated exploring that there is a need to stay here and work with this team.  Continued inpatient level of care indicated to fulfill all these necessary arrangement for him to go to substance abuse rehab program I believe they are looking at [facility] in [facility] [place]    Electronically signed by Kailyn Joel MD on 4/2/2022 at 12:38 PM

## 2022-04-03 PROCEDURE — 65220000003 HC RM SEMIPRIVATE PSYCH

## 2022-04-03 PROCEDURE — 74011250637 HC RX REV CODE- 250/637: Performed by: PSYCHIATRY & NEUROLOGY

## 2022-04-03 RX ADMIN — CITALOPRAM HYDROBROMIDE 20 MG: 20 TABLET ORAL at 09:00

## 2022-04-03 RX ADMIN — METOPROLOL TARTRATE 50 MG: 50 TABLET, FILM COATED ORAL at 21:17

## 2022-04-03 RX ADMIN — TRAZODONE HYDROCHLORIDE 50 MG: 50 TABLET ORAL at 21:17

## 2022-04-03 RX ADMIN — RISPERIDONE 2 MG: 2 TABLET ORAL at 21:17

## 2022-04-03 RX ADMIN — DIVALPROEX SODIUM 500 MG: 500 TABLET, DELAYED RELEASE ORAL at 08:50

## 2022-04-03 RX ADMIN — RISPERIDONE 2 MG: 2 TABLET ORAL at 08:49

## 2022-04-03 RX ADMIN — METOPROLOL TARTRATE 50 MG: 50 TABLET, FILM COATED ORAL at 08:49

## 2022-04-03 RX ADMIN — ATORVASTATIN CALCIUM 10 MG: 10 TABLET, FILM COATED ORAL at 21:17

## 2022-04-03 RX ADMIN — DIVALPROEX SODIUM 500 MG: 500 TABLET, DELAYED RELEASE ORAL at 21:17

## 2022-04-03 NOTE — PROGRESS NOTES
Progress Note  Date:4/3/2022       Room:[facility]  Patient Name:Félix Fletcher     YOB: 1960     Age:62 y.o. Subjective    Subjective   Review of Systems  Objective         Vitals Last 24 Hours:  TEMPERATURE:  Temp  Av.4 °F (36.9 °C)  Min: 98.2 °F (36.8 °C)  Max: 98.6 °F (37 °C)  RESPIRATIONS RANGE: Resp  Av.5  Min: 17  Max: 18  PULSE OXIMETRY RANGE: No data recorded  PULSE RANGE: Pulse  Av  Min: 58  Max: 60  BLOOD PRESSURE RANGE: Systolic (35NAU), ASM:003 , Min:127 , FEB:911   ; Diastolic (13SUY), HVW:63, Min:61, Max:83    I/O (24Hr): No intake or output data in the 24 hours ending 22 1524  Objective  Labs/Imaging/Diagnostics    Labs:  CBC:No results for input(s): WBC, RBC, HGB, HCT, MCV, RDW, PLT, HGBEXT, HCTEXT, PLTEXT in the last 72 hours. CHEMISTRIES:No results for input(s): NA, K, CL, CO2, BUN, CA, PHOS, MG in the last 72 hours. No lab exists for component: CREATININE, GLUCOSEPT/INR:No results for input(s): INR, INREXT in the last 72 hours. No lab exists for component: PROTIME  APTT:No results for input(s): APTT in the last 72 hours. LIVER PROFILE:No results for input(s): AST, ALT in the last 72 hours. No lab exists for component: MARIBEL WalterPHOS  Lab Results   Component Value Date/Time    ALT (SGPT) 22 2022 05:59 AM    AST (SGOT) 21 2022 05:59 AM    Alk. phosphatase 55 2022 05:59 AM    Bilirubin, total 0.4 2022 05:59 AM       Imaging Last 24 Hours:  No results found.   Assessment//Plan   Active Problems:    Substance abuse ([address] (3/16/2022)      Suicidal ideations (3/16/2022)      MDD (major depressive disorder) (3/30/2022)      Assessment & Plan seen for follow-up chart review spoke with the nursing staff patient compliant with medication calm polite going to the activities groups relaxation group other groups and no psychosis no hallucination delusional paranoia no visual or auditory hallucination not suicidal not homicidal no agitation no aggression no side effects vital signs are stable no new labs resulted and continued inpatient level of care indicated that her is primary team to see him tomorrow for disposition thank you    Electronically signed by Anthony Singh MD on 4/3/2022 at 3:24 PM

## 2022-04-03 NOTE — BH NOTES
Patient up and busy in the mileau at the beginning of the shift. Interactive and talkative with staff and unit peers. Mood-smiling. Affect-happy. Denies SI,HI and AVH's. Thoughts are organized. Conversation is logical. Eye contact is good. Attends all PSR. States he enjoys going to all of his groups. Med-diet compliant. Participates in snack time. Tolerates fluids well. Denies pain. Comfortable. VSS.

## 2022-04-03 NOTE — BH NOTES
Client has been quiet and withdrawn. He presents a flat affect and depressed mood. He has a good appetite and reports sleeping well. Denies feeling suicidal or homicidal.Reports feeling much better. Takes meds as prescribed and denies any side effects. Remains on close observation for safety.

## 2022-04-03 NOTE — GROUP NOTE
IP  GROUP DOCUMENTATION INDIVIDUAL                                                                          Group Therapy Note    Date: 4/3/2022    Group Start Time: 1500  Group End Time: 5395  Group Topic: Recreational/Music Therapy    SRM 2  NON ACUTE    Delfino Herrera    IP [facility] GROUP DOCUMENTATION GROUP    Group Therapy Note    Attendees: 6/11  Facilitated structured group to introduce healthy leisure task skill as positive way to cope and manage stress. Attendance: Attended    Patient's Goal: STG: Attends activities and groups        Interventions/techniques: Art integration and Supported    Follows Directions: Followed directions    Interactions: Interacted appropriately    Mental Status: Calm    Behavior/appearance: Attentive    Goals Achieved: Able to engage in interactions and Able to listen to others      Additional Notes: Attended group and actively participated. Offered leisure packet. Declined packet but listened to music with peers. Able to select songs in group. Was receptive to intervention and used time constructively.     Elizabeth Posey, CTRS

## 2022-04-03 NOTE — GROUP NOTE
IP  GROUP DOCUMENTATION INDIVIDUAL                                                                          Group Therapy Note    Date: 4/3/2022    Group Start Time: 1115  Group End Time: 1200  Group Topic: Recreational/Music Therapy    SRM 2  NON ACUTE    Ilana Arteaga    IP [address] [facility] GROUP DOCUMENTATION GROUP    Group Therapy Note    Attendees: 3/11  Facilitated structured group to introduce Mindfulness and Meditation as positive way to cope and manage daily stressors. Introduced relaxation techniques       Attendance: Attended    Patient's Goal: STG: Attends activities and groups      Interventions/techniques: Informed and Supported    Follows Directions: Followed directions    Interactions: Interacted appropriately    Mental Status: Calm    Behavior/appearance: Attentive    Goals Achieved: Able to engage in interactions and Able to listen to others    Additional Notes: Attended group and participated with encouragement. Received materials provided. Listened to music and verbalized enjoyment. Was receptive to intervention. Verbalized he will sleep to relax and has a plan to go to rehab after this hospitalization.     Wilfredo Mckeon, CTRS

## 2022-04-03 NOTE — PROGRESS NOTES
Problem: Depressed Mood (Adult/Pediatric)  Goal: *STG: Attends activities and groups  Outcome: Progressing Towards Goal   Patient attends all PSR. States he enjoys all the groups.

## 2022-04-03 NOTE — BH NOTES
Behavioral Health Treatment Team Note     Patient goal(s) for today: Go to groups  Treatment team focus/goals: Medication management, group therapy, discharge planning    Progress note: Pt presents as calm and cooperative with restricted/congruent affect. Pt denies SI/HI/AVH. He is interested in when he will be discharged and provided a number for his sister Aime Thomas: [phone]. LOS:  4  Expected LOS: 5-7    Insurance info/prescription coverage:  [address] Medicare HMO    Date of last family contact:  Contacted sisterAime 4/3/22 Margiovanna Danyel says he is welcome to come back and live there and she thinks PHP is a good idea. She sounded hesitant about rehab. Family requesting physician contact today:  no  Discharge plan:   To be established prior to discharge   Guns in the home:  no   Outpatient provider(s):  None at this time, Possible referral to [facility]    Participating treatment team members: Arvin Councilman, * (assigned SW), Ariel Lopez MSW

## 2022-04-04 VITALS
WEIGHT: 130 LBS | BODY MASS INDEX: 19.26 KG/M2 | TEMPERATURE: 98.2 F | SYSTOLIC BLOOD PRESSURE: 124 MMHG | DIASTOLIC BLOOD PRESSURE: 82 MMHG | OXYGEN SATURATION: 97 % | HEIGHT: 69 IN | RESPIRATION RATE: 18 BRPM | HEART RATE: 67 BPM

## 2022-04-04 PROCEDURE — 74011250637 HC RX REV CODE- 250/637: Performed by: PSYCHIATRY & NEUROLOGY

## 2022-04-04 RX ORDER — METOPROLOL TARTRATE 50 MG/1
50 TABLET ORAL 2 TIMES DAILY
Qty: 30 TABLET | Refills: 0 | Status: SHIPPED | OUTPATIENT
Start: 2022-04-04

## 2022-04-04 RX ORDER — CITALOPRAM 20 MG/1
20 TABLET, FILM COATED ORAL DAILY
Qty: 30 TABLET | Refills: 1 | Status: SHIPPED | OUTPATIENT
Start: 2022-04-04

## 2022-04-04 RX ORDER — TRAZODONE HYDROCHLORIDE 50 MG/1
50 TABLET ORAL
Qty: 30 TABLET | Refills: 0 | Status: SHIPPED | OUTPATIENT
Start: 2022-04-04

## 2022-04-04 RX ORDER — ATORVASTATIN CALCIUM 10 MG/1
10 TABLET, FILM COATED ORAL
Qty: 30 TABLET | Refills: 0 | Status: SHIPPED | OUTPATIENT
Start: 2022-04-04

## 2022-04-04 RX ORDER — DIVALPROEX SODIUM 500 MG/1
500 TABLET, DELAYED RELEASE ORAL 2 TIMES DAILY
Qty: 60 TABLET | Refills: 0 | Status: SHIPPED | OUTPATIENT
Start: 2022-04-04

## 2022-04-04 RX ORDER — DIVALPROEX SODIUM 500 MG/1
500 TABLET, DELAYED RELEASE ORAL 2 TIMES DAILY
Qty: 60 TABLET | Refills: 1 | Status: SHIPPED | OUTPATIENT
Start: 2022-04-04

## 2022-04-04 RX ORDER — RISPERIDONE 2 MG/1
2 TABLET, FILM COATED ORAL 2 TIMES DAILY
Qty: 60 TABLET | Refills: 0 | Status: SHIPPED | OUTPATIENT
Start: 2022-04-04

## 2022-04-04 RX ADMIN — RISPERIDONE 2 MG: 2 TABLET ORAL at 08:34

## 2022-04-04 RX ADMIN — CITALOPRAM HYDROBROMIDE 20 MG: 20 TABLET ORAL at 08:34

## 2022-04-04 RX ADMIN — METOPROLOL TARTRATE 50 MG: 50 TABLET, FILM COATED ORAL at 08:34

## 2022-04-04 RX ADMIN — DIVALPROEX SODIUM 500 MG: 500 TABLET, DELAYED RELEASE ORAL at 08:34

## 2022-04-04 NOTE — PROGRESS NOTES
Problem: Suicide  Goal: *STG: Remains safe in hospital  4/4/2022 0546 by Denver Piccolo, RN  Outcome: Progressing Towards Goal  4/4/2022 0534 by Denver Piccolo, RN  Outcome: Progressing Towards Goal  Goal: *STG: Attends activities and groups  Outcome: Progressing Towards Goal  Goal: *STG/LTG: Complies with medication therapy  4/4/2022 0546 by Denver Piccolo, RN  Outcome: Progressing Towards Goal  4/4/2022 0534 by Denver Piccolo, RN  Outcome: Progressing Towards Goal  Goal: *STG/LTG: No longer expresses self destructive or suicidal thoughts  4/4/2022 0546 by Denver Piccolo, RN  Outcome: Progressing Towards Goal  4/4/2022 0534 by Denver Piccolo, RN  Outcome: Progressing Towards Goal     Problem: Falls - Risk of  Goal: *Absence of Falls  Description: Document Fang Locket Fall Risk and appropriate interventions in the flowsheet. Outcome: Progressing Towards Goal  Note: Fall Risk Interventions:    - pt remains safe; no self injurious behavior noted or reported. - pt up in the milieu and in the group room watching a movie with his peers. - pt is med compliant.  - no self destructive or suicidal thoughts expressed. - pt ambulates independently and without difficulty    The pt has been pleasant and cooperative throughout the evening with a broad affect. He interacts well with the staff and his peers. He accepted snacks and something to drink. Pt is med compliant. Pt has been present on the unit and sat in the group room watching a movie. Pt denies having any A/D/SI/HI or hallucinations. No distress noted or voiced. Pt ambulates independently and without difficulty. He has rested quietly in bed throughout the night, sleeping off and on. Respirations are quiet and unlabored. He remains on close observation for safety.

## 2022-04-04 NOTE — BH NOTES
DISCHARGE SUMMARY    NAME:Félix Tatum  : 1960  MRN: [id number]    The patient Jodi Quinonez exhibits the ability to control behavior in a less restrictive environment. Patient's level of functioning is improving. No assaultive/destructive behavior has been observed for the past 24 hours. No suicidal/homicidal threat or behavior has been observed for the past 24 hours. There is no evidence of serious medication side effects. Patient has not been in physical or protective restraints for at least the past 24 hours. If weapons involved, how are they secured? N/A    Is patient aware of and in agreement with discharge plan? Yes    Arrangements for medication:  Prescriptions given to patient, given a weeks supply or 30 day supply. Copy of discharge instructions to provider?:  Yes    Arrangements for transportation home:  Medicaid Cab Trip RT#[id number] [phone]  ETA 3pm    Keep all follow up appointments as scheduled, continue to take prescribed medications per physician instructions. Mental health crisis number:  234 or your local mental health crisis line number at [phone].        Mental Health Emergency WARM LINE      [phone] ([phone])      M-F: 9am to 9pm      Sat & Sun: 5pm - 9pm  National suicide prevention lines:                             4-331-OCQCHBL ([phone])       [phone]-TALK ([phone])    Crisis Text Line:  Text HOME to [phone]

## 2022-04-04 NOTE — GROUP NOTE
IP  GROUP DOCUMENTATION INDIVIDUAL                                                                          Group Therapy Note    Date: 4/4/2022    Group Start Time: 1520  Group End Time: 6503  Group Topic: Recreational/Music Therapy    SRM 2  NON ACUTE    Dio Levy    IP [facility] GROUP DOCUMENTATION GROUP    Group Therapy Note    Facilitated leisure skills group to reinforce positive coping and to manage mood through music, social interaction, group activities and art task    Attendees: 7/11         Attendance: Attended    Patient's Goal:  Attend group daily     Interventions/techniques: Art integration and Supported    Follows Directions: Followed directions    Interactions: Interacted appropriately    Mental Status: Calm    Behavior/appearance: Cooperative    Goals Achieved: Able to engage in interactions and Able to listen to others      Additional Notes:  Receptive to listening to music while working on leisure task. Interacted with staff. Left group.  Did not return    Lavonne Chacko

## 2022-04-04 NOTE — BH NOTES
PSYCHIATRIC DISCHARGE SUMMARY         IDENTIFICATION:    Patient Name  Mellisa Phelan   Date of Birth 1960   [facility] [id number]   Medical Record Number  [id number]      Age  65 y.o. PCP Cathi Levi NP   Admit date:  3/30/2022    Discharge date: 4/4/2022   Room Number  238/02  @ [facility]   Date of Service  4/4/2022            TYPE OF DISCHARGE: REGULAR               CONDITION AT DISCHARGE: stable       PROVISIONAL & DISCHARGE DIAGNOSES:     MDD (major depressive disorder) (3/30/2022)  Alcohol dependence  History of bipolar disorder/schizoaffective disorder     CC & HISTORY OF PRESENT ILLNESS:  CC: Major depressive disorder, reported suicidal overdose         HISTORY OF PRESENT ILLNESS:      The patient, Mellisa Phelan, is a 65 y.o. BLACK/ male admitted to the behavioral health floor after patient presented to the emergency department reporting that he has been increasingly depressed and had overdosed on his medications as a suicidal attempt. Patient was unable to provide the triggers leading to his overdose. Patient has a long history of multiple previous psychiatric hospitalizations. As per the initial review of information electronically reported history of overdose on Depakote metoprolol, citalopram, Atrovent statin, risperidone, trazodone. Patient is medically cleared and toxicology cleared as well.     Patient during inpatient psychiatric evaluation is very limited insight information is obtained as review of electronic records talking to behavioral health staff and also to the patient. Patient with no active command hallucinations. Patient unable to identify any of the stresses currently bleeding but admits to using cocaine and alcohol regularly and psychosocial stressors.        SOCIAL HISTORY:    Social History     Socioeconomic History    Marital status: SINGLE     Spouse name: Not on file    Number of children: Not on file    Years of education: Not on file    Highest education level: Not on file   Occupational History    Not on file   Tobacco Use    Smoking status: Never Smoker    Smokeless tobacco: Current User   Vaping Use    Vaping Use: Never used   Substance and Sexual Activity    Alcohol use: Yes     Alcohol/week: 6.0 standard drinks     Types: 6 Cans of beer per week     Comment: 40oz/day    Drug use: Yes     Types: Cocaine     Comment: Last used today    Sexual activity: Not Currently   Other Topics Concern     Service Not Asked    Blood Transfusions Not Asked    Caffeine Concern Not Asked    Occupational Exposure Not Asked    Hobby Hazards Not Asked    Sleep Concern Not Asked    Stress Concern Not Asked    Weight Concern Not Asked    Special Diet Not Asked    Back Care Not Asked    Exercise Not Asked    Bike Helmet Not Asked    Seat Belt Not Asked    Self-Exams Not Asked   Social History Narrative    Not on file     Social Determinants of Health     Financial Resource Strain:     Difficulty of Paying Living Expenses: Not on file   Food Insecurity:     Worried About Running Out of Food in the Last Year: Not on file    Evette of Food in the Last Year: Not on file   Transportation Needs:     Lack of Transportation (Medical): Not on file    Lack of Transportation (Non-Medical):  Not on file   Physical Activity:     Days of Exercise per Week: Not on file    Minutes of Exercise per Session: Not on file   Stress:     Feeling of Stress : Not on file   Social Connections:     Frequency of Communication with Friends and Family: Not on file    Frequency of Social Gatherings with Friends and Family: Not on file    Attends Cheondoism Services: Not on file    Active Member of Clubs or Organizations: Not on file    Attends Club or Organization Meetings: Not on file    Marital Status: Not on file   Intimate Partner Violence:     Fear of Current or Ex-Partner: Not on file    Emotionally Abused: Not on file   Timur Marie Physically Abused: Not on file    Sexually Abused: Not on file   Housing Stability:     Unable to Pay for Housing in the Last Year: Not on file    Number of Places Lived in the Last Year: Not on file    Unstable Housing in the Last Year: Not on file      FAMILY HISTORY:   Family History   Problem Relation Age of Onset    Hypertension Father              HOSPITALIZATION COURSE:    Keyona Mcgraw was admitted to the inpatient psychiatric unit [facility] for acute psychiatric stabilization in regards to symptomatology as described in the HPI above. While on the unit Keyona Mcgraw was involved in individual, group, occupational and milieu therapy. Psychiatric medications were adjusted during this hospitalization. Keyona Mcgraw demonstrated a slow, but progressive improvement in overall condition. Much of patient's depression appeared to be related to situational stressors, effects of drugs of abuse, and psychological factors. Please see individual progress notes for more specific details regarding patient's hospitalization course. At time of discharge, Keyona Mcgraw is without significant problems of depression, psychosis, jordan. Patient free of suicidal and homicidal ideations and reports many positive predictive factors in terms of not attempting suicide or homicide. Patient with request for discharge today. There are no grounds to seek a TDO. Patient has maximized benefit to be derived from acute inpatient psychiatric treatment. All members of the treatment team concur with each other in regards to plans for discharge today per patient's request.  Patient and family are aware and in agreement with discharge and discharge plan. LABS AND IMAGAING:    Labs Reviewed   CBC WITH AUTOMATED DIFF - Abnormal; Notable for the following components:       Result Value    WBC 3.8 (*)     RBC 4.08 (*)     HGB 11.3 (*)     HCT 35.3 (*)     RDW 15.5 (*)     ABS.  NEUTROPHILS 1.5 (*)     All other components within normal limits   METABOLIC PANEL, COMPREHENSIVE - Abnormal; Notable for the following components:    Chloride 117 (*)     Glucose 104 (*)     BUN/Creatinine ratio 8 (*)     Calcium 8.2 (*)     Albumin 3.2 (*)     A-G Ratio 0.9 (*)     All other components within normal limits   SALICYLATE - Abnormal; Notable for the following components:    Salicylate level <0.3 (*)     All other components within normal limits   ACETAMINOPHEN - Abnormal; Notable for the following components:    Acetaminophen level <10 (*)     All other components within normal limits   DRUG SCREEN, URINE - Abnormal; Notable for the following components:    COCAINE Positive (*)     All other components within normal limits   ETHYL ALCOHOL - Abnormal; Notable for the following components:    ALCOHOL(ETHYL),SERUM 185 (*)     All other components within normal limits   VALPROIC ACID - Abnormal; Notable for the following components:    Valproic acid 15 (*)     All other components within normal limits   VALPROIC ACID - Abnormal; Notable for the following components:    Valproic acid 41 (*)     All other components within normal limits   ETHYL ALCOHOL - Abnormal; Notable for the following components:    ALCOHOL(ETHYL),SERUM 60 (*)     All other components within normal limits   COVID-19 WITH INFLUENZA A/B   URINALYSIS W/ REFLEX CULTURE   MAGNESIUM   TROPONIN-HIGH SENSITIVITY     Lab Results   Component Value Date/Time    Valproic acid 41 (L) 03/30/2022 01:07 PM     Admission on 03/30/2022   Component Date Value Ref Range Status    WBC 03/30/2022 3.8* 4.1 - 11.1 K/uL Final    RBC 03/30/2022 4.08* 4.10 - 5.70 M/uL Final    HGB 03/30/2022 11.3* 12.1 - 17.0 g/dL Final    HCT 03/30/2022 35.3* 36.6 - 50.3 % Final    MCV 03/30/2022 86.5  80.0 - 99.0 FL Final    MCH 03/30/2022 27.7  26.0 - 34.0 PG Final    MCHC 03/30/2022 32.0  30.0 - 36.5 g/dL Final    RDW 03/30/2022 15.5* 11.5 - 14.5 % Final    PLATELET 76/93/4254 529  150 - 400 K/uL Final    MPV 03/30/2022 9.2  8.9 - 12.9 FL Final    NRBC 03/30/2022 0.0  0.0  WBC Final    ABSOLUTE NRBC 03/30/2022 0.00  0.00 - 0.01 K/uL Final    NEUTROPHILS 03/30/2022 39  32 - 75 % Final    LYMPHOCYTES 03/30/2022 47  12 - 49 % Final    MONOCYTES 03/30/2022 12  5 - 13 % Final    EOSINOPHILS 03/30/2022 1  0 - 7 % Final    BASOPHILS 03/30/2022 1  0 - 1 % Final    IMMATURE GRANULOCYTES 03/30/2022 0  0 - 0.5 % Final    ABS. NEUTROPHILS 03/30/2022 1.5* 1.8 - 8.0 K/UL Final    ABS. LYMPHOCYTES 03/30/2022 1.8  0.8 - 3.5 K/UL Final    ABS. MONOCYTES 03/30/2022 0.4  0.0 - 1.0 K/UL Final    ABS. EOSINOPHILS 03/30/2022 0.0  0.0 - 0.4 K/UL Final    ABS. BASOPHILS 03/30/2022 0.0  0.0 - 0.1 K/UL Final    ABS. IMM. GRANS. 03/30/2022 0.0  0.00 - 0.04 K/UL Final    DF 03/30/2022 AUTOMATED    Final    Sodium 03/30/2022 145  136 - 145 mmol/L Final    Potassium 03/30/2022 4.0  3.5 - 5.1 mmol/L Final    Chloride 03/30/2022 117* 97 - 108 mmol/L Final    CO2 03/30/2022 22  21 - 32 mmol/L Final    Anion gap 03/30/2022 6  5 - 15 mmol/L Final    Glucose 03/30/2022 104* 65 - 100 mg/dL Final    BUN 03/30/2022 8  6 - 20 mg/dL Final    Creatinine 03/30/2022 1.00  0.70 - 1.30 mg/dL Final    BUN/Creatinine ratio 03/30/2022 8* 12 - 20   Final    GFR est AA 03/30/2022 >60  >60 ml/min/1.73m2 Final    GFR est non-AA 03/30/2022 >60  >60 ml/min/1.73m2 Final    Calcium 03/30/2022 8.2* 8.5 - 10.1 mg/dL Final    Bilirubin, total 03/30/2022 0.4  0.2 - 1.0 mg/dL Final    AST (SGOT) 03/30/2022 21  15 - 37 U/L Final    ALT (SGPT) 03/30/2022 22  12 - 78 U/L Final    Alk.  phosphatase 03/30/2022 55  45 - 117 U/L Final    Protein, total 03/30/2022 6.6  6.4 - 8.2 g/dL Final    Albumin 03/30/2022 3.2* 3.5 - 5.0 g/dL Final    Globulin 03/30/2022 3.4  2.0 - 4.0 g/dL Final    A-G Ratio 03/30/2022 0.9* 1.1 - 2.2   Final    Salicylate level 75/50/9961 <1.7* 2.8 - 20.0 mg/dL Final    Acetaminophen level 03/30/2022 <10* 10 - 30 ug/mL Final    Color 03/30/2022 Yellow/Straw    Final    Appearance 03/30/2022 Clear  Clear   Final    Specific gravity 03/30/2022 1.005  1.003 - 1.030   Final    pH (UA) 03/30/2022 6.0  5.0 - 8.0   Final    Protein 03/30/2022 Negative  Negative mg/dL Final    Glucose 03/30/2022 Negative  Negative mg/dL Final    Ketone 03/30/2022 Negative  Negative mg/dL Final    Bilirubin 03/30/2022 Negative  Negative   Final    Blood 03/30/2022 Negative  Negative   Final    Urobilinogen 03/30/2022 0.1  0.1 - 1.0 EU/dL Final    Nitrites 03/30/2022 Negative  Negative   Final    Leukocyte Esterase 03/30/2022 Negative  Negative   Final    UA:UC IF INDICATED 03/30/2022 Culture not indicated by UA result  Culture not indicated by UA result   Final    WBC 03/30/2022 0-4  0 - 4 /hpf Final    RBC 03/30/2022 0-5  0 - 5 /hpf Final    Bacteria 03/30/2022 Negative  Negative /hpf Final    AMPHETAMINES 03/30/2022 Negative  Negative   Final    BARBITURATES 03/30/2022 Negative  Negative   Final    BENZODIAZEPINES 03/30/2022 Negative  Negative   Final    COCAINE 03/30/2022 Positive* Negative   Final    METHADONE 03/30/2022 Negative  Negative   Final    OPIATES 03/30/2022 Negative  Negative   Final    PCP(PHENCYCLIDINE) 03/30/2022 Negative  Negative   Final    THC (TH-CANNABINOL) 03/30/2022 Negative  Negative   Final    Drug screen comment 03/30/2022 This test is a screen for drugs of abuse in a medical setting only (i.e., they are unconfirmed results and as such must not be used for non-medical purposes, e.g.,employment testing, legal testing). Due to its inherent nature, false positive (FP) and false negative (FN) results may be obtained. Therefore, if necessary for medical care, recommend confirmation of positive findings by GC/MS.     Final    ALCOHOL(ETHYL),SERUM 03/30/2022 185* <10 mg/dL Final    Ventricular Rate 03/30/2022 71  BPM Final    Atrial Rate 03/30/2022 71  BPM Final    P-R Interval 03/30/2022 162 ms Final    QRS Duration 03/30/2022 96  ms Final    Q-T Interval 03/30/2022 452  ms Final    QTC Calculation (Bezet) 03/30/2022 491  ms Final    Calculated P Axis 03/30/2022 71  degrees Final    Calculated R Axis 03/30/2022 30  degrees Final    Calculated T Axis 03/30/2022 27  degrees Final    Diagnosis 03/30/2022    Final                    Value:Normal sinus rhythm  Prolonged QT  Abnormal ECG  When compared with ECG of 16-DEC-2021 02:45,  Nonspecific T wave abnormality no longer evident in Anterior leads  Confirmed by MERCED Hendreson ([id number]) on 3/30/2022 7:31:37 AM      Magnesium 03/30/2022 1.6  1.6 - 2.4 mg/dL Final    Troponin-High Sensitivity 03/30/2022 10  0 - 76 ng/L Final    Valproic acid 03/30/2022 15* 50 - 100 ug/mL Final    Valproic acid 03/30/2022 41* 50 - 100 ug/mL Final    ALCOHOL(ETHYL),SERUM 03/30/2022 60* <10 mg/dL Final    SARS-CoV-2 by PCR 03/30/2022 Not Detected  Not Detected   Final    Influenza A by PCR 03/30/2022 Not Detected  Not Detected   Final    Influenza B by PCR 03/30/2022 Not Detected  Not Detected   Final    Ventricular Rate 03/30/2022 75  BPM Final    Atrial Rate 03/30/2022 75  BPM Final    P-R Interval 03/30/2022 152  ms Final    QRS Duration 03/30/2022 82  ms Final    Q-T Interval 03/30/2022 420  ms Final    QTC Calculation (Bezet) 03/30/2022 469  ms Final    Calculated P Axis 03/30/2022 58  degrees Final    Calculated R Axis 03/30/2022 16  degrees Final    Calculated T Axis 03/30/2022 27  degrees Final    Diagnosis 03/30/2022    Final                    Value:Normal sinus rhythm  Normal ECG  When compared with ECG of 30-MAR-2022 05:57,  No significant change was found  Confirmed by Nikunj Magallanes ([id number]) on 3/30/2022 5:27:56 PM       No results found. DISPOSITION:    Patient to f/u with drug/etoh rehabilitation, psychiatric and psychotherapy appointments.               FOLLOW-UP CARE:    Activity as tolerated  Regular Diet  Wound Care: none needed. Follow-up Information     Follow up With Specialties Details Why [facility] 19 Same Day Access  Go to Walk in Monday-Friday 8:30am - 1:00pm for outpatient substance use treatment [address]  [phone]    Medicaid Cab   for ride home Trip NH#[id number]  [phone]  ETA 3pm    Staci Janene, NP Nurse Practitioner   [address] [facility] [address]  [address] [place] [phone]                   PROGNOSIS:    Limited ---- based on nature of patient's pathology/ies and treatment compliance issues. Prognosis is greatly dependent upon patient's ability to remain sober and to follow up with drug/etoh rehabilitation and psychiatric/psychotherapy appointments as well as to comply with psychiatric medications as prescribed. DISCHARGE MEDICATIONS:    Informed consent given for the use of following psychotropic medications:  Current Discharge Medication List      CONTINUE these medications which have CHANGED    Details   atorvastatin (LIPITOR) 10 mg tablet Take 1 Tablet by mouth nightly. Qty: 30 Tablet, Refills: 0  Start date: 4/4/2022      citalopram (CELEXA) 20 mg tablet Take 1 Tablet by mouth daily. Qty: 30 Tablet, Refills: 1  Start date: 4/4/2022      !! divalproex DR (DEPAKOTE) 500 mg tablet Take 1 Tablet by mouth two (2) times a day. Qty: 60 Tablet, Refills: 1  Start date: 4/4/2022      !! divalproex DR (DEPAKOTE) 500 mg tablet Take 1 Tablet by mouth two (2) times a day. Qty: 60 Tablet, Refills: 0  Start date: 4/4/2022      metoprolol tartrate (LOPRESSOR) 50 mg tablet Take 1 Tablet by mouth two (2) times a day. Qty: 30 Tablet, Refills: 0  Start date: 4/4/2022      risperiDONE (RisperDAL) 2 mg tablet Take 1 Tablet by mouth two (2) times a day. Qty: 60 Tablet, Refills: 0  Start date: 4/4/2022      traZODone (DESYREL) 50 mg tablet Take 1 Tablet by mouth nightly.   Qty: 30 Tablet, Refills: 0  Start date: 4/4/2022       !! - Potential duplicate medications found. Please discuss with provider. CONTINUE these medications which have NOT CHANGED    Details   naproxen (NAPROSYN) 500 mg tablet Take 1 Tablet by mouth two (2) times daily (with meals).   Qty: 30 Tablet, Refills: 0                    Signed:  Johana Olivarez MD  4/4/2022

## 2022-04-04 NOTE — BH NOTES
Behavioral Health Transition Record to Provider    Patient Name: Stacia Alvarez  YOB: 1960  Medical Record Number: [id number]  Date of Admission: 3/30/2022  Date of Discharge: 4/4/2022    Attending Provider: Vince Marie MD  Discharging Provider: Vince Marie MD  To contact this individual call [phone] and ask the  to page. If unavailable, ask to be transferred to [facility] Provider on call. [facility] Provider will be available on call 24/7 and during holidays. Primary Care Provider: Ira Seay NP    No Known Allergies    Reason for Admission: OD medications, unknown trigger    Admission Diagnosis: MDD (major depressive disorder) [F32.9]  Substance abuse ([address] [F19.10]  Suicidal ideations [R45.851]    * No surgery found *    Results for orders placed or performed during the hospital encounter of 03/30/22   COVID-19 WITH INFLUENZA A/B   Result Value Ref Range    SARS-CoV-2 by PCR Not Detected Not Detected      Influenza A by PCR Not Detected Not Detected      Influenza B by PCR Not Detected Not Detected     CBC WITH AUTOMATED DIFF   Result Value Ref Range    WBC 3.8 (L) 4.1 - 11.1 K/uL    RBC 4.08 (L) 4.10 - 5.70 M/uL    HGB 11.3 (L) 12.1 - 17.0 g/dL    HCT 35.3 (L) 36.6 - 50.3 %    MCV 86.5 80.0 - 99.0 FL    MCH 27.7 26.0 - 34.0 PG    MCHC 32.0 30.0 - 36.5 g/dL    RDW 15.5 (H) 11.5 - 14.5 %    PLATELET 638 397 - 408 K/uL    MPV 9.2 8.9 - 12.9 FL    NRBC 0.0 0.0  WBC    ABSOLUTE NRBC 0.00 0.00 - 0.01 K/uL    NEUTROPHILS 39 32 - 75 %    LYMPHOCYTES 47 12 - 49 %    MONOCYTES 12 5 - 13 %    EOSINOPHILS 1 0 - 7 %    BASOPHILS 1 0 - 1 %    IMMATURE GRANULOCYTES 0 0 - 0.5 %    ABS. NEUTROPHILS 1.5 (L) 1.8 - 8.0 K/UL    ABS. LYMPHOCYTES 1.8 0.8 - 3.5 K/UL    ABS. MONOCYTES 0.4 0.0 - 1.0 K/UL    ABS. EOSINOPHILS 0.0 0.0 - 0.4 K/UL    ABS. BASOPHILS 0.0 0.0 - 0.1 K/UL    ABS. IMM.  GRANS. 0.0 0.00 - 0.04 K/UL    DF AUTOMATED     METABOLIC PANEL, COMPREHENSIVE Result Value Ref Range    Sodium 145 136 - 145 mmol/L    Potassium 4.0 3.5 - 5.1 mmol/L    Chloride 117 (H) 97 - 108 mmol/L    CO2 22 21 - 32 mmol/L    Anion gap 6 5 - 15 mmol/L    Glucose 104 (H) 65 - 100 mg/dL    BUN 8 6 - 20 mg/dL    Creatinine 1.00 0.70 - 1.30 mg/dL    BUN/Creatinine ratio 8 (L) 12 - 20      GFR est AA >60 >60 ml/min/1.73m2    GFR est non-AA >60 >60 ml/min/1.73m2    Calcium 8.2 (L) 8.5 - 10.1 mg/dL    Bilirubin, total 0.4 0.2 - 1.0 mg/dL    AST (SGOT) 21 15 - 37 U/L    ALT (SGPT) 22 12 - 78 U/L    Alk.  phosphatase 55 45 - 117 U/L    Protein, total 6.6 6.4 - 8.2 g/dL    Albumin 3.2 (L) 3.5 - 5.0 g/dL    Globulin 3.4 2.0 - 4.0 g/dL    A-G Ratio 0.9 (L) 1.1 - 2.2     SALICYLATE   Result Value Ref Range    Salicylate level <7.1 (L) 2.8 - 20.0 mg/dL   ACETAMINOPHEN   Result Value Ref Range    Acetaminophen level <10 (L) 10 - 30 ug/mL   URINALYSIS W/ REFLEX CULTURE    Specimen: Urine   Result Value Ref Range    Color Yellow/Straw      Appearance Clear Clear      Specific gravity 1.005 1.003 - 1.030      pH (UA) 6.0 5.0 - 8.0      Protein Negative Negative mg/dL    Glucose Negative Negative mg/dL    Ketone Negative Negative mg/dL    Bilirubin Negative Negative      Blood Negative Negative      Urobilinogen 0.1 0.1 - 1.0 EU/dL    Nitrites Negative Negative      Leukocyte Esterase Negative Negative      UA:UC IF INDICATED Culture not indicated by UA result Culture not indicated by UA result      WBC 0-4 0 - 4 /hpf    RBC 0-5 0 - 5 /hpf    Bacteria Negative Negative /hpf   DRUG SCREEN, URINE   Result Value Ref Range    AMPHETAMINES Negative Negative      BARBITURATES Negative Negative      BENZODIAZEPINES Negative Negative      COCAINE Positive (A) Negative      METHADONE Negative Negative      OPIATES Negative Negative      PCP(PHENCYCLIDINE) Negative Negative      THC (TH-CANNABINOL) Negative Negative      Drug screen comment        This test is a screen for drugs of abuse in a medical setting only (i.e., they are unconfirmed results and as such must not be used for non-medical purposes, e.g.,employment testing, legal testing). Due to its inherent nature, false positive (FP) and false negative (FN) results may be obtained. Therefore, if necessary for medical care, recommend confirmation of positive findings by GC/MS. ETHYL ALCOHOL   Result Value Ref Range    ALCOHOL(ETHYL),SERUM 185 (H) <10 mg/dL   MAGNESIUM   Result Value Ref Range    Magnesium 1.6 1.6 - 2.4 mg/dL   TROPONIN-HIGH SENSITIVITY   Result Value Ref Range    Troponin-High Sensitivity 10 0 - 76 ng/L   VALPROIC ACID   Result Value Ref Range    Valproic acid 15 (L) 50 - 100 ug/mL   VALPROIC ACID   Result Value Ref Range    Valproic acid 41 (L) 50 - 100 ug/mL   ETHYL ALCOHOL   Result Value Ref Range    ALCOHOL(ETHYL),SERUM 60 (H) <10 mg/dL   EKG, 12 LEAD, INITIAL   Result Value Ref Range    Ventricular Rate 71 BPM    Atrial Rate 71 BPM    P-R Interval 162 ms    QRS Duration 96 ms    Q-T Interval 452 ms    QTC Calculation (Bezet) 491 ms    Calculated P Axis 71 degrees    Calculated R Axis 30 degrees    Calculated T Axis 27 degrees    Diagnosis       Normal sinus rhythm  Prolonged QT  Abnormal ECG  When compared with ECG of 16-DEC-2021 02:45,  Nonspecific T wave abnormality no longer evident in Anterior leads  Confirmed by MERCED FERNANDO ([id number]) on 3/30/2022 7:31:37 AM     EKG, 12 LEAD, SUBSEQUENT   Result Value Ref Range    Ventricular Rate 75 BPM    Atrial Rate 75 BPM    P-R Interval 152 ms    QRS Duration 82 ms    Q-T Interval 420 ms    QTC Calculation (Bezet) 469 ms    Calculated P Axis 58 degrees    Calculated R Axis 16 degrees    Calculated T Axis 27 degrees    Diagnosis       Normal sinus rhythm  Normal ECG  When compared with ECG of 30-MAR-2022 05:57,  No significant change was found  Confirmed by Nikunj Magallanes ([id number]) on 3/30/2022 5:27:56 PM         Immunizations administered during this encounter:    There is no immunization history on file for this patient. Screening for Metabolic Disorders for Patients on Antipsychotic Medications  (Data obtained from the EMR)    Estimated Body Mass Index  Estimated body mass index is 19.2 kg/m² as calculated from the following:    Height as of this encounter: 5' 9\" (1.753 m). Weight as of this encounter: 59 kg (130 lb). Vital Signs/Blood Pressure  Visit Vitals  /82   Pulse 67   Temp 98.1 °F (36.7 °C)   Resp 16   Ht 5' 9\" (1.753 m)   Wt 59 kg (130 lb)   SpO2 97%   BMI 19.20 kg/m²       Blood Glucose/Hemoglobin A1c  Lab Results   Component Value Date/Time    Glucose 104 (H) 03/30/2022 05:59 AM       No results found for: HBA1C, [id number]     Lipid Panel  No results found for: CHOL, CHOLX, CHLST, CHOLV, 414597, HDL, HDLP, LDL, LDLC, DLDLP, TGLX, TRIGL, TRIGP, CHHD, CHHDX     Discharge Diagnosis: MDD (major depressive disorder) [F32.9]  Substance abuse ([facility] [address] [F19.10]  Suicidal ideations [R45.851]    Discharge Plan: Pt will be discharged to sister's house today via medicaid cab ETA 3pm. Pt will contact [address] [place] [address] same day access to schedule IOP substance use treatment. Discharge Medication List and Instructions:   Current Discharge Medication List          Unresulted Labs (24h ago, onward)            None        To obtain results of studies pending at discharge, please contact [phone]    Follow-up Information     Follow up With Specialties Details Why [address] [place] [address] 19 Same Day Access  Go to Walk in Monday-Friday 8:30am - 1:00pm for outpatient substance use treatment [address] [place] [address]  [phone]    Medicaid Cab   for ride home Trip #[id number]  [phone]  ETA 3pm          Advanced Directive:   Does the patient have an appointed surrogate decision maker? No  Does the patient have a Medical Advance Directive? No  Does the patient have a Psychiatric Advance Directive?  No  If the patient does not have a surrogate or Medical Advance Directive AND Psychiatric Advance Directive, the patient was offered information on these advance directives Patient will complete at a later time    Patient Instructions: Please continue all medications until otherwise directed by physician. Tobacco Cessation Discharge Plan:   Is the patient a smoker and needs referral for smoking cessation? Not applicable  Patient referred to the following for smoking cessation with an appointment? Not applicable     Patient was offered medication to assist with smoking cessation at discharge? Not applicable  Was education for smoking cessation added to the discharge instructions? Not applicable    Alcohol/Substance Abuse Discharge Plan:   Does the patient have a history of substance/alcohol abuse and requires a referral for treatment? Yes  Patient referred to the following for substance/alcohol abuse treatment with an appointment? Yes  Patient was offered medication to assist with alcohol cessation at discharge? Not applicable  Was education for substance/alcohol abuse added to discharge instructions? Not applicable    Patient discharged to home with IOP services through [facility].

## 2022-04-04 NOTE — GROUP NOTE
KEANU  GROUP DOCUMENTATION INDIVIDUAL                                                                          Group Therapy Note    Date: 4/4/2022    Group Start Time: 7055  Group End Time: 1400  Group Topic: Process Group - Inpatient    [facility] [address] GROUP DOCUMENTATION GROUP    Group Therapy Note: Facilitator encouraged group to process thoughts and feelings. The topic of grief and loss was introduced and handouts provided. Group members acknowledged feeling sadness over the loss of someone or something in their life and discussed ways to cope and move forward. Attendees: 6         Attendance: Attended    Patient's Goal:  To attend groups    Interventions/techniques: Informed, Validated and Supported    Follows Directions: Followed directions    Interactions: Interacted appropriately    Mental Status: Calm and Congruent    Behavior/appearance: Attentive, Cooperative and Motivated    Goals Achieved: Able to engage in interactions, Able to listen to others, Able to give feedback to another, Able to reflect/comment on own behavior, Able to manage/cope with feelings, Able to self-disclose, Discussed coping and Verbalized increased hopefulness      Additional Notes:  Pt discussed times he was there to assist loved ones with their loss.     Kimberley Marlow

## 2022-04-04 NOTE — BH NOTES
DISCHARGE/ DAYSHIFT NOTE:     Patient up this morning and stated that he was \"fine. \" Patient up for breakfast and stated that it was \"nice. \" Patient is pleasant on approach and likes to joke around and talk with staff. Patient anticipating discharge today and stated that he was ready to go home. Patient denies having any depression and or anxiety. Denies SI/HI. Denies AH/VH. Patient is medication compliant. Patient has attended groups. Patient is interactive and social able amongst his peers. Patient did receive orders for discharge today. Patient initially was to be discharged via medicaid cab that was suppose to be here at 3pm but at 4pm when the cab was still not here a fastcab was called instead. Patient discharged via fastcab. Discharge instructions reviewed and understood with the patient. Prescriptions were electronically sent to [facility] and patient was made aware of where his medications were sent. Valuables returned to the patient from the safe and the belongings closet. Patient was able to get his dinner tray on the way out the door and take it with him. Patient discharged without any complaints voiced.
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
## 2022-04-04 NOTE — BH NOTES
1545: Followed up on medicaid cab. Agent states  called wrong number using pt number vs. instructions provided to contact nurses station. Agent got a supervisor on the line to expedite new ride. Never got back on the line. Called [employer] ETA 1 hour.

## 2022-04-04 NOTE — GROUP NOTE
IP  GROUP DOCUMENTATION INDIVIDUAL                                                                          Group Therapy Note    Date: 4/4/2022    Group Start Time: 1115  Group End Time: 1155  Group Topic: Education Group - Inpatient    [facility] 2 BH NON ACUTE    Ozzy Craig    IP  GROUP DOCUMENTATION GROUP    Group Therapy Note    Facilitated discussion focus on understanding and developing healthy boundaries to improve relationships    Attendees: 5/11         Attendance: Attended    Patient's Goal:  Attend group daily     Interventions/techniques: Informed and Supported    Follows Directions:  Followed directions    Interactions: Interacted appropriately    Mental Status: Calm    Behavior/appearance: Cooperative    Goals Achieved: Able to engage in interactions, Able to listen to others and Able to self-disclose      Additional Notes:  Receptive to information discussed on healthy and unhealthy boundaries and was able to share some of them that applied to him    Alan Marlow, [address]

## 2023-05-17 RX ORDER — CITALOPRAM 20 MG/1
20 TABLET ORAL DAILY
COMMUNITY
Start: 2022-04-04

## 2023-05-17 RX ORDER — DIVALPROEX SODIUM 500 MG/1
500 TABLET, DELAYED RELEASE ORAL 2 TIMES DAILY
COMMUNITY
Start: 2022-04-04

## 2023-05-17 RX ORDER — NAPROXEN 500 MG/1
500 TABLET ORAL 2 TIMES DAILY WITH MEALS
COMMUNITY
Start: 2022-03-21

## 2023-06-01 NOTE — ED NOTES
Patient discharged to home following routine work found to be within defined limits. Medications, fluids and treatments tolerated well by patient. Normal vital signs. Reviewed follow up instructions and medications with patient. Extensive conversation regarding return precautions discussed. Patient acknowledged understanding. All personal belongings taken by patient. Azathioprine Counseling:  I discussed with the patient the risks of azathioprine including but not limited to myelosuppression, immunosuppression, hepatotoxicity, lymphoma, and infections.  The patient understands that monitoring is required including baseline LFTs, Creatinine, possible TPMP genotyping and weekly CBCs for the first month and then every 2 weeks thereafter.  The patient verbalized understanding of the proper use and possible adverse effects of azathioprine.  All of the patient's questions and concerns were addressed.

## 2024-03-06 ENCOUNTER — HOSPITAL ENCOUNTER (EMERGENCY)
Facility: HOSPITAL | Age: 64
Discharge: HOME | End: 2024-03-06
Attending: STUDENT IN AN ORGANIZED HEALTH CARE EDUCATION/TRAINING PROGRAM
Payer: MEDICARE

## 2024-03-06 ENCOUNTER — APPOINTMENT (OUTPATIENT)
Facility: HOSPITAL | Age: 64
End: 2024-03-06
Payer: MEDICARE

## 2024-03-06 VITALS
TEMPERATURE: 98.7 F | HEART RATE: 105 BPM | SYSTOLIC BLOOD PRESSURE: 166 MMHG | RESPIRATION RATE: 20 BRPM | OXYGEN SATURATION: 100 % | BODY MASS INDEX: 22.22 KG/M2 | HEIGHT: 69 IN | DIASTOLIC BLOOD PRESSURE: 93 MMHG | WEIGHT: 150 LBS
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
## 2024-03-06 DIAGNOSIS — S82.891A CLOSED FRACTURE OF RIGHT ANKLE, INITIAL ENCOUNTER: Primary | ICD-10-CM

## 2024-03-06 DIAGNOSIS — I10 ESSENTIAL HYPERTENSION: ICD-10-CM

## 2024-03-06 PROCEDURE — 29515 APPLICATION SHORT LEG SPLINT: CPT

## 2024-03-06 PROCEDURE — 99283 EMERGENCY DEPT VISIT LOW MDM: CPT

## 2024-03-06 PROCEDURE — 73610 X-RAY EXAM OF ANKLE: CPT

## 2024-03-06 ASSESSMENT — PAIN SCALES - GENERAL: PAINLEVEL_OUTOF10: 10

## 2024-03-06 ASSESSMENT — PAIN - FUNCTIONAL ASSESSMENT: PAIN_FUNCTIONAL_ASSESSMENT: 0-10
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
## 2024-03-07 ENCOUNTER — TELEPHONE (OUTPATIENT)
Age: 64
End: 2024-03-07

## 2024-03-07 NOTE — TELEPHONE ENCOUNTER
Patient called to be seen as soon as possible for a Closed fracture of right ankle, initial encounter. He was seen at [facility] on 03/06/2024.    Please advise the soonest he can be seen.     Patient can be reached at [phone].

## 2024-03-07 NOTE — DISCHARGE INSTRUCTIONS
Please make sure you keep your ankle in the splint until you follow-up with the orthopedic doctor.    Your blood pressure was elevated today.  This could be because of your ankle fracture, or it could be an underlying medical problem.  Please make sure you follow-up with your primary care doctor to make sure your high blood pressure medications are at the right dose.

## 2024-03-11 NOTE — TELEPHONE ENCOUNTER
Attempt to reach patient to let him know of Dr. Bell' response.  Phone goes straight to voice mail but voice mail has not been set up yet so unable to leave a message.  If pt calls again, per Dr. Bell he needs to go to [facility] [phone]
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
